# Patient Record
Sex: FEMALE | Race: WHITE | NOT HISPANIC OR LATINO | Employment: FULL TIME | URBAN - METROPOLITAN AREA
[De-identification: names, ages, dates, MRNs, and addresses within clinical notes are randomized per-mention and may not be internally consistent; named-entity substitution may affect disease eponyms.]

---

## 2022-05-17 ENCOUNTER — MEDICAL CORRESPONDENCE (OUTPATIENT)
Dept: HEALTH INFORMATION MANAGEMENT | Facility: CLINIC | Age: 27
End: 2022-05-17
Payer: COMMERCIAL

## 2022-05-23 ENCOUNTER — MEDICAL CORRESPONDENCE (OUTPATIENT)
Dept: HEALTH INFORMATION MANAGEMENT | Facility: CLINIC | Age: 27
End: 2022-05-23
Payer: COMMERCIAL

## 2022-05-23 ENCOUNTER — TRANSFERRED RECORDS (OUTPATIENT)
Dept: HEALTH INFORMATION MANAGEMENT | Facility: CLINIC | Age: 27
End: 2022-05-23
Payer: COMMERCIAL

## 2022-05-23 DIAGNOSIS — O09.892 HX OF PRETERM DELIVERY, CURRENTLY PREGNANT, SECOND TRIMESTER: ICD-10-CM

## 2022-05-23 DIAGNOSIS — G40.909 SEIZURE DISORDER DURING PREGNANCY, ANTEPARTUM (H): Primary | ICD-10-CM

## 2022-05-23 DIAGNOSIS — O99.350 SEIZURE DISORDER DURING PREGNANCY, ANTEPARTUM (H): Primary | ICD-10-CM

## 2022-06-03 ENCOUNTER — PRE VISIT (OUTPATIENT)
Dept: MATERNAL FETAL MEDICINE | Facility: CLINIC | Age: 27
End: 2022-06-03
Payer: COMMERCIAL

## 2022-06-09 ENCOUNTER — OFFICE VISIT (OUTPATIENT)
Dept: MATERNAL FETAL MEDICINE | Facility: CLINIC | Age: 27
End: 2022-06-09
Attending: OBSTETRICS & GYNECOLOGY
Payer: COMMERCIAL

## 2022-06-09 ENCOUNTER — HOSPITAL ENCOUNTER (OUTPATIENT)
Dept: ULTRASOUND IMAGING | Facility: CLINIC | Age: 27
Discharge: HOME | End: 2022-06-09
Attending: OBSTETRICS & GYNECOLOGY
Payer: COMMERCIAL

## 2022-06-09 DIAGNOSIS — O09.892 HX OF PRETERM DELIVERY, CURRENTLY PREGNANT, SECOND TRIMESTER: ICD-10-CM

## 2022-06-09 DIAGNOSIS — G40.909 SEIZURE DISORDER DURING PREGNANCY, ANTEPARTUM (H): ICD-10-CM

## 2022-06-09 DIAGNOSIS — O99.350 SEIZURE DISORDER DURING PREGNANCY, ANTEPARTUM (H): ICD-10-CM

## 2022-06-09 PROBLEM — F44.5 CONVERSION DISORDER WITH SEIZURES OR CONVULSIONS: Status: ACTIVE | Noted: 2019-04-18

## 2022-06-09 PROCEDURE — 76811 OB US DETAILED SNGL FETUS: CPT | Mod: 26 | Performed by: OBSTETRICS & GYNECOLOGY

## 2022-06-09 PROCEDURE — 76817 TRANSVAGINAL US OBSTETRIC: CPT | Mod: 26 | Performed by: OBSTETRICS & GYNECOLOGY

## 2022-06-09 PROCEDURE — 76811 OB US DETAILED SNGL FETUS: CPT

## 2022-06-09 PROCEDURE — 99202 OFFICE O/P NEW SF 15 MIN: CPT | Mod: 25 | Performed by: OBSTETRICS & GYNECOLOGY

## 2022-06-09 RX ORDER — METOCLOPRAMIDE 10 MG/1
TABLET ORAL
COMMUNITY
Start: 2022-03-25 | End: 2023-08-23

## 2022-06-09 RX ORDER — PROMETHAZINE HYDROCHLORIDE 25 MG/1
TABLET ORAL
COMMUNITY
End: 2023-08-23

## 2022-06-09 RX ORDER — ONDANSETRON 4 MG/1
TABLET, FILM COATED ORAL
COMMUNITY
End: 2023-08-23

## 2022-06-09 NOTE — PROGRESS NOTES
"RE: Myrtle Strickland  : 1995  MRUN: [id number]    2022    Dear Dr. Patricia,    Thank you for referring your patient Myrtle Strickland for a Maternal-Fetal Medicine consultation today.  As you know, Myrtle is a 27 year old  at 19w6d gestation with an Estimated Date of Delivery: Oct 28, 2022 by LMP who presents today to discuss recommendations in the setting of maternal seizure disorder and history of  birth.     Today Myrtle feels well. She presents with her mother today.  She reports her seizures stress and anxiety induced, and always have been.  Her mother notes that she had febrile seizures in childhood however grew out of those and then subsequently had seizures mostly around stress.  Myrtle reports that she is taking anxiety medication to help with this.  She notes this pregnancy has been difficult as she has had a lot of nausea and vomiting as well as episodes of passing out.  Her syncopal episodes are more frequent in the first trimester but have improved in the second trimester.  Her daughter who is almost 3 years old usually spends most of the time with the FOB, however he is out of town and so she will be spending more time with Myrtle's mother.  They have decided together that she will try to avoid alone time given her frequent seizures and syncopal episodes.  The FOB for her current pregnancy is involved but also out of town at this time.  She did have a recent EEG as recommended by neurology, which showed the same as before, which is anxiety induced seizure episodes.  She had a follow-up with them by phone.  She is aware of seizure precautions including no driving and avoiding activities that would pose a risk to her safety if she were to have an episode during them including swimming or bathing.    Aside from her seizure history, Myrtle does note a family history of OTC deficiency, and poor history of \"boys in the family.\"  Other relatives have had second trimester losses of boys as well as " "boy children in the family who are affected by OTC deficiency.   The patient and her mother report they have been tested and are both carriers for this, but she has not had testing in pregnancy before.  Her mother notes she had a amniocentesis during one of her pregnancies which confirmed this disease.  Myrtle notes that she has discussed this with her current partner and they are undecided about genetic testing however will consider this.    Regarding her  birth history, She reports she went into labor around 34 weeks.  Her daughter was born shortly thereafter and did have an extended stay in the NICU, but overall did very well.  She notes she has had 2 prior ultrasounds in this pregnancy, and previous providers have commented on \"how low the baby is \"and have expressed concern about this and the need for bedrest.  Patient expressed she has not been doing this and has been doing her normal daily activity.  She notes previous provider had once mentioned progesterone supplementation this pregnancy given her history of  birth, however was not brought up again.  She has already done her own research with her partner and they decided based on the risks and benefits this was not worth pursuing.    Obstetrical History:    OB History    Para Term  AB Living   5 1 0 1 3 1   SAB IAB Ectopic Multiple Live Births   2 0 0 0 1      # Outcome Date GA Lbr Karl/2nd Weight Sex Delivery Anes PTL Lv   5 Current            4  19 34w5d  2.34 kg (5 lb 2.5 oz) F Vag-Spont   CLARISSA      Name: Papo      Apgar1: 8  Apgar5: 9   3 SAB 13 6w0d          2 2010 4w0d          1 AB              Gynecological History:    Regular cycles.    Last pap 3/2021, NILM    No known history of myomas or uterine abnormalities.    Medical History:   Past Medical History:   Diagnosis Date     Depressive disorder      Neurological disorder      Uncomplicated asthma    ADHD  Anxiety/depression  PNEA    Surgical " History:   None    Medications:     Current Outpatient Medications:      metoclopramide (REGLAN) 10 MG tablet, metoclopramide 10 mg tablet  Take 1 tablet by mouth three times a day as needed (nausea)., Disp: , Rfl:      Prenatal MV & Min w/FA-DHA (PRENATAL ADULT GUMMY/DHA/FA PO), Prenatal Gummies, Disp: , Rfl:      promethazine (PHENERGAN) 25 MG tablet, promethazine 25 mg tablet  Take 1 tablet by mouth every 6-8 hours by oral route., Disp: , Rfl:      sertraline (ZOLOFT) 50 MG tablet, sertraline 50 mg tablet  Take 1 tablet every day by oral route., Disp: , Rfl:      ondansetron (ZOFRAN) 4 MG tablet, ondansetron HCl 4 mg tablet  Take 1 tablet by mouth every 6-8 hours as directed. (Patient not taking: Reported on 6/9/2022), Disp: , Rfl:      Allergies:   Allergies   Allergen Reactions     Guaifenesin Hives and Nausea and Vomiting     Adhesive Tape Other (See Comments)     Burns only with waterproof bandages. [facility] hospital Tegaderm and tape is okay.  Burns only with waterproof bandages. [facility] Tegaderm and tape is okay.  Burns only with waterproof bandages. [facility] Tegaderm and tape is okay.  Burns only with waterproof bandages. [facility] Tegaderm and tape is okay.       Mushroom      Latex Hives, Itching and Rash     Social History:    Non-contributory    Family History:     Patient reports family history of ornithine transcarboxylase deficiency       Review of Systems:    10 point review of systems negative except as noted in the HPI    Data Reviewed:      Prenatal labs  o Rh type: negative, antibody screen: negative  o Hemoglobin 12.9 mg/dL, hematocrit 39.7 %, MCV 91 fL, platelets 274 thou/ L  o Hemoglobin electrophoresis:  o Syphillis/HepBsAg/HIV: NR  o Hepatitis C: NR  o Rubella IgG: immune  o Gonorrhea/Chlamydia: negative  o Urine culture: urogenital debbie  o Pap smear: 3/21/22 NILM HPV negative  o Aneuploidy screening: low risk NIPT    Ultrasound:    Please see imaging tab/separate  report for ultrasound performed at [facility] today    Physical examination was deferred at this time.    Assessment/Plan:  In light of the patient s history as listed above our discussion and recommendations can be summarized briefly as follows:    History of Spontaneous  Birth (PTB)   Today we discussed the incidence and epidemiology of  birth (PTB).  There are multiple etiologies of spontaneous PTB, including but not limited to infection, bleeding, uterine distension, cervical insufficiency and stress.   However, most spontaneous  deliveries occur in patients with no risk factors.  A history of prior  delivery is a significant risk factor for recurrence in a subsequent pregnancy. We discussed her risk of one prior spontaneous PTB ranges from 15-30%.  Recurrent  delivery has been linked to maternal ethnicity, genitourinary infection, and especially gestational age at the first  delivery: the earlier the delivery, the greater the likelihood of recurrence.  We discussed that recurrent  birth can happen at the same gestational age as a prior  birth but that it cannot be predicted and it could recur at an earlier gestational age.       We discussed the increased  morbidity and mortality with prematurity which is the rationale for trying to prevent recurrent  birth.  We typically follow cervical lengths every two from 16 to 23 weeks in women with a history of PTB; an ultrasound indicated cerclage may be considered if shortening is detected prior to 23-24 weeks, given that all cases of cervical insufficiency may not fit into the classic patterns by history.    We briefly discussed the mixed data regarding the use of weekly intramuscular injections of 17 hydroxyprogesterone caproate (17-OHP) supplementation as a means to modify the risk of recurrent  birth.  A multicenter, double-blind randomized controlled trial found a 34% reduction in recurrent   birth <37 weeks (Vance et al, 2003) with the use of 17-OHP .  This trial also found a significant reduction in recurrent  birth <35 and <32 weeks.  A more recent international, double-blind randomized controlled trial (PROLONG) found no reduction in the rate of recurrent  birth <35 weeks (Esequiel et al, 2019), calling into question the established use of 17-OHP.  Importantly, both studies indicate that 17-OHP is safe, at least in the short term. The current conclusion of [facility] is that it is reasonable to offer 17-OHP to women with a profile more representative of the very high risk patient, but that all women at risk of recurrent  birth should have a discussion of the risks/benefits and undergo a shared decision-making process. Myrtle expressed that she does not have an interest in pursuing this intervention.    Recommendations:  ; Repeat TVUS cervical length once more in 2 weeks from now.  ; Ultrasound-indicated cerclage should be considered if shortening <25 mm is diagnosed <24 weeks.  ; Administration of  corticosteroids if the patient is deemed to be at increased risk of imminent  delivery.  ; Urine culture every trimester with aggressive treatment of bacteriuria.  ; Clinical evaluation of  labor symptoms.      History of Non-epileptiform seizure activity disorder  As above, we reviewed her seizure history and consultation with neurology. Given her seizures are not epileptiform in nature, she is not taking any antiepileptic drugs at this time. She is on medication for anxiety, which she should continue as indicated. We discussed that we defer to neurology and agree with their recommendations including standard seizure precautions such as no driving, swimming, or bathing.  We briefly discussed the recommendation to consider brain MRI after pregnancy, however she can follow-up with neurology as needed for this. There is otherwise no changes to management in  pregnancy aside from routine prenatal care. She can follow-up with her primary obstetric provider for this.    Family history of OTC Deficiency  We discussed the option for genetic counseling given this family history. Patient said she will discuss with her partner, consider it and will call our clinic to schedule an appointment if desired.     At the end of our discussion, Myrtle indicated that her questions were answered and she seemed satisfied with our discussion.  Thank you for the opportunity to participate in your patient s care.  If I can be of any further assistance, please do not hesitate to contact me.    Patient seen with Dr. Camacho.    Tracy Steele MD  ObGyn, PGY-2  06/09/22 9:08 AM

## 2022-06-09 NOTE — NURSING NOTE
"Pt here for L2/TV, MFM consult d/t hx of PTD, seizure disorder, remote hx of asthma/MACK. Pt denies substance exposure in this pregnancy, no recent seizures. States seizures are anxiety induced. Prior to her PTD she states she was [place] for 2 months, came in \"partially\" ruptured and 1-2 cm by record review. New FOB this pregnancy, pt concerned that primary OB hasn't completed family hx of FOB. She shares today that fam hx includes lung cancer, HTN, cardiovascular disease, ADD. No heart defects requiring surgery that she is aware of except FOBs nephew who is now doing well. No FOB personal or immediate family medical hx of CHD requiring surgery. Pt seen by Dr. Camacho.   "

## 2022-06-27 ENCOUNTER — TELEPHONE (OUTPATIENT)
Dept: MATERNAL FETAL MEDICINE | Facility: CLINIC | Age: 27
End: 2022-06-27

## 2022-06-27 DIAGNOSIS — E72.4 OTC (ORNITHINE TRANSCARBAMYLASE DEFICIENCY) (H): Primary | ICD-10-CM

## 2022-06-27 DIAGNOSIS — Z82.79 FAMILY HISTORY OF CONGENITAL OR GENETIC CONDITION: ICD-10-CM

## 2022-06-27 NOTE — TELEPHONE ENCOUNTER
"Talked with Myrtle after she called to order an amniocentesis. [facility] did not have orders for amniocentesis or an indication. Myrtle reviewed a discussion she had during her [facility] consult about her family history of OTC deficiency and unfortunately, infant male deaths. Myrtle shares her own diagnosis of OTC deficiency in childhood when she had \"episodes\". She also now has a history of seizures. She would like to do an amniocentesis since this pregnancy is known male (ultrasound and NIPT) to determine if he has OTC deficiency as well. Upon gathering more information, Myrtle has never had genetic testing to confirm her OTC variant, which would be needed before we can test the pregnancy. She feels she would like to do the amniocentesis around 30 weeks because the results would not alter their pregnancy decisions and due to her history of  delivery she would like the procedure further along so if it causes  labor it would be past viability. 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 If she had an amniocentesis and her male fetus inherited the OTC deficiency that would also prompt a prenatal plan for delivery and she would recommended to deliver at the [facility] with genetics support and management and the best NICU.     Plan: The first step will be carrier testing for Myrtle to determine her variant. This will be done with a genetic counseling visit. Myrtle will be coming in Thursday.     Gisel Pool MS, [facility]  Licensed Genetic Counselor   [facility]  Maternal Fetal Medicine  [email or web address]  [email or web address]  Office: [phone]  Pager [phone]  [facility]: [phone]   Fax: [phone]                                    "

## 2022-06-28 ENCOUNTER — TELEPHONE (OUTPATIENT)
Dept: MATERNAL FETAL MEDICINE | Facility: CLINIC | Age: 27
End: 2022-06-28

## 2022-06-28 NOTE — TELEPHONE ENCOUNTER
Called Myrtle after reviewing case during genetics case conference. General genetics would like to see patient instead of [facility] genetics. General will assess Myrtle's OTC carrier status through biochemical testing and genetic testing. Upon the identification of the familial variant, [facility] will then speak with Myrtle about coordinating an amniocentesis. If the male pregnancy is found to have inherited the variant, [facility] will coordinate with pediatric genetics to plan for management and delivery of the pregnancy, as well as infant care at [place]. Myrtle is agreeable to this plan. A provider or  from genetics will Myrtle to offer an appointment.     Gisel Pool MS, [facility]  Licensed Genetic Counselor   [facility]  Maternal Fetal Medicine  [email or web address]  [email or web address]  Office: [phone]  Pager [phone]  [facility]: [phone]   Fax: [phone]

## 2022-07-19 NOTE — PROGRESS NOTES
Name:  Myrtle Strickland  :   1995  MRN:   [id number]  Date of service: 2022  Primary Provider: No primary care provider on file.  Referring Provider: No ref. 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 Myrtle was accompanied to this visit by her . I met with Myrtle at the request of Dr. June to obtain a family history, discuss possible genetic contributions to her symptoms, and to obtain informed consent for genetic testing.    HPI:  Myrtle is a 27 year old-year old female who is 25w6d pregnant () with a family history of OTC deficiency, seizure disorder, ADHD, Asperger's, asthma, and depression. Her fetus is male, and her pregnancy care is currently managed by [facility]. Myrtle reports that she wants genetic testing to confirm her carrier status, and if she is confirmed a carrier she wants an amniocentesis to confirm if her male fetus is affect to help with the birth plan.     She reports that she was diagnosed with OTC deficiency at age 6 which was managed by a low protein diet. Both her mother and sister were diagnosed with OTC deficiency as well during pregnancy via biochemical testing. Her late brother passed shortly after birth due to unknown causes, Myrtle reports his cord blood may have been tested for OTC deficiency. Myrtle has not received genetic testing for OTC deficiency, and it is unclear if other family members have received genetic testing. No biochemical or molecular reports are available for review during today's visit.     She reports her seizures stress and anxiety induced, and are currently managed with anxiety medication.  Her mother notes that she had febrile seizures in childhood however grew out of those at age [age], and then subsequently had seizures mostly around stress.       Patient Active Problem List  "  Diagnosis     Asperger's disorder     Anxiety disorder     Attention deficit hyperactivity disorder (ADHD)     Benign joint hypermobility     Conversion disorder with seizures or convulsions     Developmental expressive writing disorder     History of asthma      delivery     Orthostatic hypotension     Seizure disorder (H)     Past Medical History:  Past Medical History:   Diagnosis Date     Depressive disorder      Neurological disorder      Uncomplicated asthma        Family History:   A three generation pedigree was obtained today and scanned into the EMR. The following information was provided:  Children    One daughter, 3 years old, who was born premature at 34w5d. She has sever protein aversion and experiences vomitting when eats a lot of protein.   Siblings    Full siblings: One sister, 22, who was diagnosed with OTC deficiency via biochemical labs, she also had a pregnancy loss at 22 weeks, cause unknown. One brother, who passed away in  shortly after birth of unknown causes, he may have also had OTC deficiency, Myrtle believes his cord blood may have been tested.    Maternal Family    Mother, NICHOLE PEREZ:  Diagnosed with OTC deficiency during pregnancy with Myrtle with biochemical testing, and Myrtle believes her mother may have had genetic testing too but records are unavailable. She also has seizure disorder. Breast cancer at age 11, and \"like 30\" different cancers in her lifetime including stomach, uterine, ovarain, bone, and multiple skin cancers.     Maternal grandfather: passed of emphysema    Maternal grandmother: passed age 80 from protein deficiency. Breast cancer in her 30s.    Maternal aunts/uncles: One aunt who is well. Two identical twin uncles, one who is well, the other who has lung cancer and stomach cancer in his 40s, and a brain tumor unknown type but likely benign.     Maternal cousins: One female cousin from the aunt, who has OTC deficiency-like symptoms that were first " observed while she was pregnant. One female cousin through the uncle who had cancers, who is 7 years old and noticed signs of OTC deficiency-like symptoms at age 4 including severe protein aversion.    Paternal Family    Father, Data Unavailable: substance abuse, bones that break easily, and memory loss believed to be related to the substance abuse.     Paternal grandfather: passed, history of substance abuse.     Paternal grandmother: History of breast cancer in her 20s, diabetic, and has internal heart monitor.    Paternal aunts/uncles: One aunt, health status unknown. One uncle, passed of breast, bone, and brain cancer in his 60s. He also had his first bout of breast cancer in his early 50s.     The family history is otherwise negative for protein aversion, seizures, coma, sudden death, genetic testing, and known genetic disorders. Consanguinity was not assessed.    Discussion and Assessment:  Genetics of OTC Deficiency  As a review, ornithine transcarbamylase (OTC) deficiency is known as a urea cycle disorder. The urea cycle is a biochemical process in the cells of the body that helps convert nitrogen to urea so it can be removed by the body. Different steps of the urea cycle are completed by specialized proteins called enzymes. These enzymes are coded for by our genes.     Genes are long stretches of DNA that are responsible for how our bodies look and how our bodies work. Genes are inherited from the parents. When there is a change, called a variant, in the DNA sequence of a gene it can cause the gene to not work the way it is supposed to. OTC deficiency is caused by variants in a gene called OTC which produces an enzyme important for the urea cycle. Variants therefore disrupt the urea cycle and cause nitrogen in the form of ammonia to build up.  At high levels this is toxic to the body, and can damage the nervous system.     Our genes are inherited on structures called chromosomes of which we have 23 pairs.  "The first 22 pairs of chromosomes are the same in males and females while the final pair of chromosomes, the sex chromosomes (X and Y), are different in males and females. Males have one copy of the X-chromosome and one copy of the Y-chromosome while females have two copies of the X-chromosome. Therefore females have two copies of each gene on the X-chromosome while males have just one. The OTC gene is found on the X-chromosome. Therefore females have two copies of this gene while males have just one.     Because females have two copies of the OTC gene, if they have a variant on one copy of the OTC gene they still have a \"back-up\" copy of the OTC gene without a variant. Males, in contrast, do not have a back-up OTC gene. Therefore if they have a variant in this gene they tend to have more severe symptoms than females. However, some females do have symptoms of OTC deficiency.     Whether or not a female has symptoms is in large part due to something called X-inactivation. This means that in each cell a woman has in her body, only one copy of the X-chromosome is active (turned on and making enzyme) while the other is inactivated (turned off and not making enzyme). If the X-chromosome with the OTC variant is activated/turned on in more cells, she may be more likely to have symptoms.     For a female who carries an OTC variant, there is a 50% chance with each pregnancy that she will pass down the copy of the OTC gene with the variant, and a 50% chance she will pass down her copy of the OTC gene without the variant. Whether a child is male or female depends on the male partner and which sex chromosome he passes on (the X-chromosome or the Y-chromosome).     A male with OTC deficiency also has a 50% chance of passing down his OTC gene variant. Because the OTC gene is on the X-chromosome, if he passes this down all of his female children will inherit the OTC variant. If he passes down his Y-chromosome which does not have " an OTC gene, this child would be a male and he would not have an OTC variant.    Genetic Testing  Genetic testing can look for a DNA change that can lead to OTC deficiency. There are a few possible outcomes to this testing:     - a positive result: This means we found a DNA change in OTC gene. This would be consistent with confirmation of OTC deficiency carrier status. Female carriers of OTC deficiency can still have symptoms of OTC deficiency.   - a negative result: This means that we did not find a DNA change in OTC gene. Currently, genetic testing can detect a DNA change in 90% of OTC cases cases so a negative result does not rule out the possibility of OTC deficiency.   - a variant of unknown significance (VUS) result: This means that we found a DNA change but little is known about this specific DNA change to determine whether it can cause OTC deficiency. As we learn more about genetics of OTC deficiency, VUS results could be re-classified as either a positive or negative result therefore it is important to check back in with genetics every few years in the event of a VUS result.     Management and surveillance of Myrtle will depend on the genetic test results. It can also help predict the recurrence risk for Myrtle and other family members to have children with similar healthcare needs.    Prior Authorization and Initiating Testing  Insurance prior authorization and billing procedures were covered with the family. Our prior authorization process takes 2 to 4 weeks, at which time the family will be contacted with the determination. They can choose to cancel the test if they wish, otherwise, a blood draw will be scheduled. The family may be charges separately for a blood draw.    Results are expected in 4-6 weeks. I will call Myrtle when we have results and we will schedule a follow-up visit if needed at that time. Myrtle is encouraged to reach out to me with any questions in the meantime.     Family History of  "Cancer  The family history of multiple primary cancers in an individual, breast cancers at an early age, and male breast cancer are concerning for an inherited genetic risk for cancer. While the majority of cancer is sporadic in nature, some families carry a genetic predisposition to cancer. These families have a clustering of cancer in the family and/or early ages of onset. Along with the cancer types already seen in the family, the risk for other types of cancer may also be increased. We discussed the purpose of genetic testing and the changes in management that could be made based on genetic testing, personal, and family history. We discussed that the best individual to test is an individual who has been diagnosed with cancer. Local cancer genetic counseling testing is available through the Cancer Risk Management Program (contact information below).  They can ask for a referral from their primary care provider. It is also important to continue to inform healthcare providers about the family history of cancer so they can recommend appropriate screening and management.     Cancer Risk Management  ? 4-426-792-2134  ? [email or web address]-[email or web address]-management-program     If relatives are not local and need assistance finding a genetic counselor in their area, they can use the www.nsgc.org \"find a genetic counselor\" link.    Plan:  1. A blood sample will be collected and sent to [facility] the [facility] for OTC gene sequencing with deletion/duplication analysis on a genome backbone.    2. After testing is initiated, results will be returned by phone in 4-6 weeks and we will schedule a follow-up appointment according to Dr. June.    3. Contact information was provided should any questions arise in the future.      Agustina Cartwright MS Genetic Counseling Program Student   [facility]     Approximate Time Spent in Consultation: 60 min    "

## 2022-07-19 NOTE — PROGRESS NOTES
GENETICS CLINIC CONSULTATION     Name:  Myrtle Strickland  :   1995  MRN:   [id number]  Date of service: 2022  Primary Care Provider: No primary care provider on file.  Referring Provider: No ref. provider found    Dear Dr. Kesha Camacho      We had the pleasure of seeing Myrtle in Genetics Clinic today.     Reason for consultation:  A consultation in the [facility] was requested for Myrtle, a 27 year old female, for evaluation of possible OTC deficiency.      Myrtle was accompanied to this visit by her partner. She also saw our genetic counselor and metabolic dietitian at this visit.       History is obtained from electronic health record.    Assessment:    Myrtle Strickland is a 27 year old female with a self reported history of being diagnosed with OTC during childhood. Unfortunately, no biochemical or molecular reports are available for review during today's visit.    We will obtain plasma amino acids, ammonia, CMP and urine orotic acid as a part of biochemical work up. We will also send OTC single gene testing to be done on a genome platform through [facility].  If biochemical or molecular testing confirms OTC, appropriate treatment will be started.  It was also recommended that Myrtle report a 3-day diet history to her metabolic dietitian to assess her baseline protein intake.    If both biochemical as well as molecular testing comes back negative, it would be recommended to repeat a plasma amino acids after higher protein intake.  Brief discussion regarding dietary and medical management for her as well as her son after birth done during today's visit.  With an extensive family history of multiple cancers, cancer genetic counseling is recommended for her    We will see her back in 1 month or sooner pending test results.  Myrtle verbalized understanding and agreed to the plan. All questions were answered to the best of my knowledge.    Plan:    1. Ordered at this visit:   Orders Placed  This Encounter   Procedures     Amino acids plasma quantitative     Ammonia     Comprehensive metabolic panel     Organic Acid Comprehensive Urine     Creatinine random urine     Orotic Acid Urine     Urine Creatinine (Random) for Endocrine Testing     Organic acid comprehensive urine     Orotic acid urine         2. Genetic testing: Prior-auth for NGS (sequencing+del/dup).   3. Genetic counseling consultation with Sahra Aguayo MS, [facility] to obtain pedigree and obtain consent for genetic testing   4. Follow up: 1 month or sooner pending test results    Update 2022 7 PM:  Chinmay ammonia and liver function came back completely normal.  -----------------------------------    History of Present Illness:  Myrtle Strickland is a 27 year old female with    Patient Active Problem List   Diagnosis     Asperger's disorder     Anxiety disorder     Attention deficit hyperactivity disorder (ADHD)     Benign joint hypermobility     Conversion disorder with seizures or convulsions     Developmental expressive writing disorder     History of asthma      delivery     Orthostatic hypotension     Seizure disorder (H)     Myrtle reports being born 6 weeks prematurely.  She had normal growth and development during infancy.  She reports having dietary protein aversion in late infancy.  She would have symptoms of GI upset and vomiting with excessive protein intake.  She was reportedly diagnosed with OTC at the age of 4-6 years of age at the [facility] following an episode of GI upset following protein intake.  She reports being on a protein restricted diet since then.  She has never taken Citrulline supplements as a child.  Myrtle does not count protein but try to restrict herself from taking too much protein.  She does not report having any clinical complications with her previous pregnancy and childbirth suggestive of a hyperammonemic episode    There is also history of febrile seizure starting early in childhood.  She reports  having seizure-like episodes in the absence of fever when she was in middle school.  Each episode would last for 30 seconds to 1-1/2 minutes and this would involve twitching of her extremities and would be followed by postictal phase.  She was apparently evaluated by neurology and was diagnosed with anxiety which were provoking the seizures.  She has been on Zoloft which helps with her anxiety.    She reports having learning issues in school and being diagnosed with ADHD and autism spectrum disorder.    There is also history of multiple female family members on her mother side of the family with a history of protein intolerance and diagnosis of OTC.  It is unclear if anyone has had a molecular testing being done.    Past Medical History:  Past Medical History:   Diagnosis Date     Depressive disorder      Neurological disorder      Uncomplicated asthma      No significant hospitalizations    Past Surgical History:  History of adenotonsillectomy in early childhood.    Medications:  Current Outpatient Medications   Medication Sig Dispense Refill     metoclopramide (REGLAN) 10 MG tablet metoclopramide 10 mg tablet   Take 1 tablet by mouth three times a day as needed (nausea).       ondansetron (ZOFRAN) 4 MG tablet ondansetron HCl 4 mg tablet   Take 1 tablet by mouth every 6-8 hours as directed. (Patient not taking: Reported on 6/9/2022)       Prenatal MV & Min w/FA-DHA (PRENATAL ADULT GUMMY/DHA/FA PO) Prenatal Gummies       promethazine (PHENERGAN) 25 MG tablet promethazine 25 mg tablet   Take 1 tablet by mouth every 6-8 hours by oral route.       sertraline (ZOLOFT) 50 MG tablet sertraline 50 mg tablet   Take 1 tablet every day by oral route.         Allergies:  Allergies   Allergen Reactions     Guaifenesin Hives and Nausea and Vomiting     Adhesive Tape Other (See Comments)     Burns only with waterproof bandages. [facility] Tegaderm and tape is okay.  Burns only with waterproof bandages. [facility]  "Tegaderm and tape is okay.  Burns only with waterproof bandages. [facility] hospital Tegaderm and tape is okay.  Burns only with waterproof bandages. [facility] hospital Tegaderm and tape is okay.       Mushroom      Latex Hives, Itching and Rash       Immunization:  Most Recent Immunizations   Administered Date(s) Administered     DTAP (<7y) 03/23/1999     DTP-Hib 1995     FLU 6-35 months 11/19/2012     Flu, Unspecified 09/04/2009     HPV Quadrivalent 12/08/2008     Hep B, Peds or Adolescent 1995     HepA-ped 2 Dose 06/11/2008     HepB-Adult 1995     Hib (PRP-T) 09/24/1996     Historical DTP/aP 1995     Influenza Vaccine IM > 6 months Valent IIV4 (Alfuria,Fluzone) 11/19/2012     MMR 03/23/1999     Meningococcal (Menactra ) 08/16/2007     Meningococcal (Menomune ) 08/16/2007     OPV, trivalent, live 03/23/1999     TRIHIBIT (DTAP/HIB, <7y) 03/23/1999     UTD: Yes    Diet:  Restricted.  Reports self restriction of protein intake    Care team:  No care team member to display        ROS   ROS: 10 point ROS neg other than the symptoms noted above in the HPI.  Family History:    A detailed pedigree was obtained by the genetic counselor at the time of this appointment and is scanned into the electronic medical record. I personally reviewed and discussed the pedigree with the GC and the family and concur with the GC note. Please refer to the formal pedigree for full details.   No family history on file.    Social History:  Lives with daughter and spouse/partner    Physical Examination:  Last menstrual period 01/21/2022.  /76 (BP Location: Right arm, Patient Position: Sitting, Cuff Size: Adult Regular)   Pulse 83   Ht 5' 6.14\" (168 cm)   Wt 148 lb 13 oz (67.5 kg)   LMP 01/21/2022   HC 54.8 cm (21.58\")   BMI 23.92 kg/m        General: WDWN in NAD, appears stated age  Head and Face: NCAT  Ears: Well-formed, normal in position and placement, canals patent  Eyes: Normal in position and placement, " EOMI; lids, lashes, and brows unremarkable  Nose: Nares patent  Mouth/Throat: Lips, philtrum, palate, dentition unremarkable  Neck: No pits, tags, fissures  Chest: Symmetric,   Respiratory: Clear to auscultation bilaterally  Cardiovascular: Regular rate and rhythm with no murmur  Abdomen: Deferred  Genitourinary: Deferred  Extremities/Musculoskeletal: Symmetrical; full ROM; hands, feet, nails, palmar and plantar creases unremarkable  Neurologic: Mental status appropriate for age; good tone, strength, and muscle bulk  Skin: Unremarkable    Genetic testing done to date:  N/A    Pertinent lab results:   N/A    Imaging/ procedure results:  N/A  No results found for this or any previous visit (from the past 744 hour(s)).         Thank you for allowing us to participate in the care of Myrtleangus Strickland. Please do not hesitate to contact us with questions.      90 minutes spent on the date of the encounter doing chart review, history and exam, documentation and further activities per the note           Suzy June MD    Genetics and Metabolism  Pager: [phone]     [facility] [employer] ([employer] speech recognition transcription software was used to create portions of this document.  An attempt at proofreading has been made to minimize errors; however, minor errors in transcription may be present. Please call if questions.    Route to  No care team member to display

## 2022-07-20 ENCOUNTER — OFFICE VISIT (OUTPATIENT)
Dept: CONSULT | Facility: CLINIC | Age: 27
End: 2022-07-20
Attending: PEDIATRICS
Payer: COMMERCIAL

## 2022-07-20 ENCOUNTER — TELEPHONE (OUTPATIENT)
Dept: NUTRITION | Facility: CLINIC | Age: 27
End: 2022-07-20

## 2022-07-20 VITALS
DIASTOLIC BLOOD PRESSURE: 76 MMHG | HEIGHT: 66 IN | HEART RATE: 83 BPM | SYSTOLIC BLOOD PRESSURE: 108 MMHG | BODY MASS INDEX: 23.92 KG/M2 | WEIGHT: 148.81 LBS

## 2022-07-20 DIAGNOSIS — Z71.83 ENCOUNTER FOR NONPROCREATIVE GENETIC COUNSELING: Primary | ICD-10-CM

## 2022-07-20 DIAGNOSIS — E72.4 OTC (ORNITHINE TRANSCARBAMYLASE DEFICIENCY) (H): Primary | ICD-10-CM

## 2022-07-20 DIAGNOSIS — Z84.89 FAMILY HISTORY OF SUDDEN DEATH IN BROTHER: ICD-10-CM

## 2022-07-20 DIAGNOSIS — E72.4 OTC (ORNITHINE TRANSCARBAMYLASE DEFICIENCY) (H): ICD-10-CM

## 2022-07-20 LAB
ALBUMIN SERPL-MCNC: 2.6 G/DL (ref 3.4–5)
ALP SERPL-CCNC: 108 U/L (ref 40–150)
ALT SERPL W P-5'-P-CCNC: 12 U/L (ref 0–50)
AMMONIA PLAS-SCNC: 11 UMOL/L (ref 10–50)
ANION GAP SERPL CALCULATED.3IONS-SCNC: 7 MMOL/L (ref 3–14)
AST SERPL W P-5'-P-CCNC: 12 U/L (ref 0–45)
BILIRUB SERPL-MCNC: 0.3 MG/DL (ref 0.2–1.3)
BUN SERPL-MCNC: 7 MG/DL (ref 7–30)
CALCIUM SERPL-MCNC: 8.4 MG/DL (ref 8.5–10.1)
CHLORIDE BLD-SCNC: 109 MMOL/L (ref 94–109)
CO2 SERPL-SCNC: 23 MMOL/L (ref 20–32)
CREAT SERPL-MCNC: 0.65 MG/DL (ref 0.52–1.04)
CREAT UR-MCNC: 266 MG/DL
CREAT UR-MCNC: 266 MG/DL
GFR SERPL CREATININE-BSD FRML MDRD: >90 ML/MIN/1.73M2
GLUCOSE BLD-MCNC: 73 MG/DL (ref 70–99)
INTERPRETATION: NORMAL
LAB PDF RESULT: NORMAL
POTASSIUM BLD-SCNC: 3.7 MMOL/L (ref 3.4–5.3)
PROT SERPL-MCNC: 6.4 G/DL (ref 6.8–8.8)
SIGNIFICANT RESULTS: NORMAL
SODIUM SERPL-SCNC: 139 MMOL/L (ref 133–144)
SPECIMEN DESCRIPTION: NORMAL
TEST DETAILS, MDL: NORMAL

## 2022-07-20 PROCEDURE — 83918 ORGANIC ACIDS TOTAL QUANT: CPT | Performed by: PEDIATRICS

## 2022-07-20 PROCEDURE — 99205 OFFICE O/P NEW HI 60 MIN: CPT | Performed by: PEDIATRICS

## 2022-07-20 PROCEDURE — 83921 ORGANIC ACID SINGLE QUANT: CPT | Performed by: PEDIATRICS

## 2022-07-20 PROCEDURE — 36415 COLL VENOUS BLD VENIPUNCTURE: CPT | Performed by: PEDIATRICS

## 2022-07-20 PROCEDURE — 99417 PROLNG OP E/M EACH 15 MIN: CPT | Performed by: PEDIATRICS

## 2022-07-20 PROCEDURE — 82570 ASSAY OF URINE CREATININE: CPT | Performed by: PEDIATRICS

## 2022-07-20 PROCEDURE — 80053 COMPREHEN METABOLIC PANEL: CPT | Performed by: PEDIATRICS

## 2022-07-20 PROCEDURE — 82040 ASSAY OF SERUM ALBUMIN: CPT | Performed by: PEDIATRICS

## 2022-07-20 PROCEDURE — 96040 HC GENETIC COUNSELING, EACH 30 MINUTES: CPT | Performed by: GENETIC COUNSELOR, MS

## 2022-07-20 PROCEDURE — 82140 ASSAY OF AMMONIA: CPT | Performed by: PEDIATRICS

## 2022-07-20 PROCEDURE — 82139 AMINO ACIDS QUAN 6 OR MORE: CPT | Performed by: PEDIATRICS

## 2022-07-20 PROCEDURE — G0463 HOSPITAL OUTPT CLINIC VISIT: HCPCS

## 2022-07-20 NOTE — PATIENT INSTRUCTIONS
Genetics  [facility]     Contact our nurse care coordinator Carrie KOLBN, RN, PHN at [phone] or send a j-Grab message for any non-urgent general or medical questions.     If you had genetic testing and have further questions, please contact the genetic counselor:    Sahra Aguayo  Ph: [phone]    To schedule appointments:  Pediatric Call Center for [facility]: [phone]  Neuropsychology [phone]   Radiology/ Imaging/Echocardiogram: [phone]   Services:   [phone]     You should receive a phone call about your next appointment. If you do not receive this within two weeks of your visit, please call [phone].     IF REFERRALS WERE PLACED/ DISCUSSED DURING THE VISIT, PLEASE LET OUR TEAM KNOW IF YOU DO NOT HEAR FROM THE SCHEDULERS IN 2 WEEKS    If you have not already done so consider signing up for [employer] by speaking with the person at the  on your way out or go to [email or web address] to sign up online.     [employer] enables easy and confidential communication with your care team.

## 2022-07-20 NOTE — Clinical Note
"2022      RE: Myrtle Strickland  [address]  [facility] [address]     Dear Colleague,    Thank you for the opportunity to participate in the care of your patient, Myrtle Strickland, at the [facility] at [facility]. Please see a copy of my visit note below.    Name:  Myrtle Strickland  :   1995  MRN:   [id number]  Date of service: 2022  Primary Provider: No primary care provider on file.  Referring Provider: No ref. provider found    Presenting Information:  Myrtle, a 27 year old female, was seen ***via a video visit*** at the [facility]etics clinic for evaluation of ***. Myrtle was accompanied to this visit by her {AAFAMILYMEMBERS:533688}. I met with Myrtle at the request of  {[facility]:269882::\"Claudia\"} to obtain a family history, discuss possible genetic contributions to her symptoms, and to obtain informed consent for genetic testing.    HPI:  Myrtle is a 27 year old-year old female who is 25w6d pregnant () with ***a family history of OTC deficiency, seizure disorder, ADHD, Asperger's, asthma, and depression. Her fetus is male.      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 Both her mother and sister have OTC deficiency as well. She has not received genetic testing, it is unclear if family members have had genetic testing***.  She wants to be seen by genetics to receive testing and if OTC carrier status is confirmed, she wants an amniocentesis to confirm if her child is an affected male to help with the birth plan.     She reports her seizures stress and anxiety induced, and always have been.  Her mother notes that she had febrile seizures in childhood however grew out of those and then subsequently had seizures mostly around stress.  Myrtle reports that she is taking anxiety medication to help with this.     Aside from her " "seizure history, Myrtle does note a family history of OTC deficiency, and poor history of \"boys in the family.\"  Other relatives have had second trimester losses of boys as well as boy children in the family who are affected by OTC deficiency.      No recent biochemical labs for OTC deficiency.    2 SAB, 1 AB    History of substance abuse (alcohol), ***no exposures reported    Patient Active Problem List   Diagnosis     Asperger's disorder     Anxiety disorder     Attention deficit hyperactivity disorder (ADHD)     Benign joint hypermobility     Conversion disorder with seizures or convulsions     Developmental expressive writing disorder     History of asthma      delivery     Orthostatic hypotension     Seizure disorder (H)       Pertinent studies/test results:   ***    Imaging results:   No results found for this or any previous visit (from the past 744 hour(s)).  No results found for any visits on 22.  No results found for this or any previous visit (from the past 8760 hour(s)).    Past Medical History:  Past Medical History:   Diagnosis Date     Depressive disorder      Neurological disorder      Uncomplicated asthma        Past Surgical History:  No past surgical history on file.    Pregnancy/ History:  Mother's age: *** years  Father's age: *** years  Myrtle was born at *** weeks gestation via {aadelivery:925451}  Prenatal care {WAS/WAS NOT:086583} received.   Pregnancy complications included {AApregcomp:597339}  Prenatal testing included {AAPRENDXTEST:576995}  Prenatal exposure and acute maternal illness during pregnancy:  ***.  The APGAR scores were *** at 1 minute and *** at 5 minutes  Birth Weight = 0 lbs 0 oz  Birth Length = Data Unavailable  Birth Head Circum. = Data Unavailable  Birth Discharge Wt. = 0 lbs 0 oz  Complications in the  period included: ***     Family History:   A three generation pedigree was obtained today and scanned into the EMR. The following information " was provided:  Siblings    Full siblings: ***    Paternal half siblings: ***    Maternal half siblings: ***    Maternal Family    Mother, NICHOLE PEREZ:  ***    Maternal grandfather: ***    Maternal grandmother: ***    Maternal aunts/uncles: ***    Maternal cousins: ***    Maternal ancestry: ***    Paternal Family    Father, Data Unavailable: ***    Paternal grandfather: ***    Paternal grandmother: ***    Paternal aunts/uncles: ***    Paternal cousins: ***    Paternal ancestry: ***    The family history is otherwise negative for ***, hearing loss, vision loss, intellectual disability, Autism spectrum disorder, developmental delay, stature differences (short/tall), -cephalic differences (macro/micro), muscle weakness, infertility, multiple miscarriages, stillbirth, birth defects, sudden death, and known genetic disorders. Consanguinity is denied.    Discussion and Assessment:  Genetics Overview  Our DNA is organized into genes, which control how we develop, grow, and how our bodies function. We inherit one copy of every gene from either parent, so one copy from mom and one copy from dad. When there is a change in our DNA it can disrupt how our body develops, grows, or functions and this can result in health differences. A *** change can be inherited from either or both parents, or it can be a new *** change that randomly occurs in a person.    Genetic Condition  ***  As a review, ornithine transcarbamylase (OTC) deficiency is known as a urea cycle disorder. The urea cycle is a biochemical process in the cells of the body that helps convert nitrogen to urea so it can be removed by the body. Different steps of the urea cycle are completed by specialized proteins called enzymes. These enzymes are coded for by our genes.     Genes are long stretches of DNA that are responsible for how our bodies look and how our bodies work. Genes are inherited from the parents. When there is a change, called a mutation, in the DNA  "sequence of a gene it can cause the gene to not work the way it is supposed to. OTC deficiency is caused by mutations in a gene called OTC which produces an enzyme important for the urea cycle. Mutations therefore disrupt the urea cycle and cause nitrogen in the form of ammonia to build up.  At high levels this is toxic to the body, and can damage the nervous system.     Our genes are inherited on structures called chromosomes of which we have 23 pairs. The first 22 pairs of chromosomes are the same in males and females while the final pair of chromosomes, the sex chromosomes (X and Y), are different in males and females. Males have one copy of the X-chromosome and one copy of the Y-chromosome while females have two copies of the X-chromosome. Therefore females have two copies of each gene on the X-chromosome while males have just one. The OTC gene is found on the X-chromosome. Therefore females have two copies of this gene while males have just one.     Because females have two copies of the OTC gene, if they have a mutation on one copy of the OTC gene they still have a \"back-up\" copy of the OTC gene without a mutation. Males, in contrast, do not have a back-up OTC gene. Therefore if they have a mutation in this gene they tend to have more severe symptoms than females. However, some females do have symptoms of OTC deficiency.     Whether or not a female has symptoms is in large part due to something called X-inactivation. This means that in each cell a woman has in her body, only one copy of the X-chromosome is active (turned on and making enzyme) while the other is inactivated (turned off and not making enzyme). If the X-chromosome with the OTC mutation is activated/turned on in more cells, she may be more likely to have symptoms.     For a female who carries an OTC mutation, there is a 50% chance with each pregnancy that she will pass down the copy of the OTC gene with the mutation, and a 50% chance she will pass " down her copy of the OTC gene without the mutation. Whether a child is male or female depends on the male partner and which sex chromosome he passes on (the X-chromosome or the Y-chromosome).     A male with OTC deficiency also has a 50% chance of passing down his OTC gene mutation. Because the OTC gene is on the X-chromosome, if he passes this down all of his female children will inherit the OTC mutation. If he passes down his Y-chromosome which does not have an OTC gene, this child would be a male and he would not have an OTC mutation.      Genetic Testing  Genetic testing can look for a *** change that can lead to ***. There are a few possible outcomes to this testing:     - a positive result: This means we found a change in ***. This would be consistent with a genetic diagnosis or confirmation of carrier status, when a person is not affected with a condition but it is possible to have an affected child.    - a negative result: This means that we did not find a change in ***. Currently, genetic testing can detect a DNA change in ***% of *** cases. If we get this result, our next step would be ***.  - a variant of unknown significance (VUS) result: This means that we found a *** change but little is known about this specific *** change to determine whether it can cause your*** health differences. As we learn more about genetics of ***, VUS results could be re-classified as either a positive or negative result therefore it is important to check back in with genetics every few years in the event of a VUS result. If the testing result is a VUS, then ***.    Management and surveillance of Myrtle will depend on the genetic test results. It can also help predict the recurrence risk for Myrtle and other family members to have children with similar healthcare needs.     ***[facility]  Benefits Investigation and Initiating Testing  *** NICHOLE PEREZ , Data Unavailable ***and/or*** Myrtle provided informed consent for the  "testing, signed with verbal consent (COVID-19)***. Myrtle will have a *** sample collected today in clinic, and this sample will be sent out to a genetic testing laboratory called [facility].     A buccal kit (cheek swab) will be sent to the family's home by a lab called [facility]. This kit must be completed, appropriately labelled with name and date of birth, and [facility] via mail.      After [facility] has received the sample***, they will look into the costs of testing through the family's insurance on their behalf.  This is called an estimation of benefits. This estimation is not guaranteed. The family will be contacted by [facility] with this information if the cost of testing exceeds $100. The family has the right to decline to proceed with testing based on the benefits investigation or switch to a patient-pay/ financial assistance option. If [facility] does not receive permission from the family to proceed with testing, testing may be initiated with insurance billing.     If the estimation of benefits is less than $100, the family will not be contacted and testing will be automatically initiated.     Results are expected in *** weeks. I will call Myrtle when we have results and we will schedule a follow-up visit if needed at that time. @[facility] is encouraged to reach out to me with any questions in the meantime.     Plan:  1. *** will be collected and sent to {DMLABS:133092} for ***    2. After testing is initiated, results will be returned by phone in *** and we will schedule a follow-up appointment according to  {[facility]:099593::\"Claudia\"}.    3. Contact information was provided should any questions arise in the future.      Agustina Cartwright  MS Genetic Counseling Program Student  [facility]     Patient seen, evaluated and discussed with the Genetic Counseling Intern. I have verified the content of the note, which accurately reflects my assessment of the patient and the plan of care.  Supervising Genetic " Counselor  ***, MS, C  Licensed Genetic Counselor   [facility]  Department of ***  Office: ***  Pager: ***    Approximate Time Spent in Consultation: *** min        Please do not hesitate to contact me if you have any questions/concerns.     Sincerely,       Sahra Aguayo, GC

## 2022-07-20 NOTE — PROGRESS NOTES
Clinical Nutrition Services - Brief note     RD checked in with patient at genetics/metabolism visit per MD request for instruction on completion of 3-day food log. Plan for genetic testing and work-up for OTC. Plan to do 3-4 day food log to estimate where current intakes are at. Provided RD contact information and food log forms for patient. Provided instruction on how to complete food logs, being as detailed as possible on food/beverage items consumed (brands, portion sizes, etc), etc. RD provided contact information to send food logs to.     Naima Stockton RD, LD  Email: [email or web address]   Phone: [phone]  Fax #: [phone]

## 2022-07-20 NOTE — LETTER
2022      RE: Myrtle Strickland  [address]  [facility] [address]     Dear Colleague,    Thank you for the opportunity to participate in the care of your patient, Myrtle Strickland, at the [facility] at [facility]. Please see a copy of my visit note below.        GENETICS CLINIC CONSULTATION     Name:  Myrtle Strickland  :   1995  MRN:   [id number]  Date of service: 2022  Primary Care Provider: No primary care provider on file.  Referring Provider: No ref. provider found    Dear Dr. Kesha Camacho      We had the pleasure of seeing Myrtle in Genetics Clinic today.     Reason for consultation:  A consultation in the [facility] was requested for Myrtle, a 27 year old female, for evaluation of possible OTC deficiency.      Myrtle was accompanied to this visit by her partner. She also saw our genetic counselor and metabolic dietitian at this visit.       History is obtained from electronic health record.    Assessment:    Myrtle Strickland is a 27 year old female with a self reported history of being diagnosed with OTC during childhood. Unfortunately, no biochemical or molecular reports are available for review during today's visit.    We will obtain plasma amino acids, ammonia, CMP and urine orotic acid as a part of biochemical work up. We will also send OTC single gene testing to be done on a genome platform through [facility].  If biochemical or molecular testing confirms OTC, appropriate treatment will be started.  It was also recommended that Myrtle report a 3-day diet history to her metabolic dietitian to assess her baseline protein intake.    If both biochemical as well as molecular testing comes back negative, it would be recommended to repeat a plasma amino acids after higher protein intake.  Brief discussion regarding dietary and medical management for her as well as her son after birth done  during today's visit.  With an extensive family history of multiple cancers, cancer genetic counseling is recommended for her    We will see her back in 1 month or sooner pending test results.  Myrtle verbalized understanding and agreed to the plan. All questions were answered to the best of my knowledge.    Plan:    1. Ordered at this visit:   Orders Placed This Encounter   Procedures     Amino acids plasma quantitative     Ammonia     Comprehensive metabolic panel     Organic Acid Comprehensive Urine     Creatinine random urine     Orotic Acid Urine     Urine Creatinine (Random) for Endocrine Testing     Organic acid comprehensive urine     Orotic acid urine         2. Genetic testing: Prior-auth for NGS (sequencing+del/dup).   3. Genetic counseling consultation with Sahra Aguayo, MS, [facility] to obtain pedigree and obtain consent for genetic testing   4. Follow up: 1 month or sooner pending test results    Update 2022 7 PM:  Juventinos ammonia and liver function came back completely normal.  -----------------------------------    History of Present Illness:  Myrtle Strickland is a 27 year old female with    Patient Active Problem List   Diagnosis     Asperger's disorder     Anxiety disorder     Attention deficit hyperactivity disorder (ADHD)     Benign joint hypermobility     Conversion disorder with seizures or convulsions     Developmental expressive writing disorder     History of asthma      delivery     Orthostatic hypotension     Seizure disorder (H)     Myrtle reports being born 6 weeks prematurely.  She had normal growth and development during infancy.  She reports having dietary protein aversion in late infancy.  She would have symptoms of GI upset and vomiting with excessive protein intake.  She was reportedly diagnosed with OTC at the age of 4-6 years of age at the [facility] following an episode of GI upset following protein intake.  She reports being on a protein restricted diet since then.  She  has never taken Citrulline supplements as a child.  Myrtle does not count protein but try to restrict herself from taking too much protein.  She does not report having any clinical complications with her previous pregnancy and childbirth suggestive of a hyperammonemic episode    There is also history of febrile seizure starting early in childhood.  She reports having seizure-like episodes in the absence of fever when she was in middle school.  Each episode would last for 30 seconds to 1-1/2 minutes and this would involve twitching of her extremities and would be followed by postictal phase.  She was apparently evaluated by neurology and was diagnosed with anxiety which were provoking the seizures.  She has been on Zoloft which helps with her anxiety.    She reports having learning issues in school and being diagnosed with ADHD and autism spectrum disorder.    There is also history of multiple female family members on her mother side of the family with a history of protein intolerance and diagnosis of OTC.  It is unclear if anyone has had a molecular testing being done.    Past Medical History:  Past Medical History:   Diagnosis Date     Depressive disorder      Neurological disorder      Uncomplicated asthma      No significant hospitalizations    Past Surgical History:  History of adenotonsillectomy in early childhood.    Medications:  Current Outpatient Medications   Medication Sig Dispense Refill     metoclopramide (REGLAN) 10 MG tablet metoclopramide 10 mg tablet   Take 1 tablet by mouth three times a day as needed (nausea).       ondansetron (ZOFRAN) 4 MG tablet ondansetron HCl 4 mg tablet   Take 1 tablet by mouth every 6-8 hours as directed. (Patient not taking: Reported on 6/9/2022)       Prenatal MV & Min w/FA-DHA (PRENATAL ADULT GUMMY/DHA/FA PO) Prenatal Gummies       promethazine (PHENERGAN) 25 MG tablet promethazine 25 mg tablet   Take 1 tablet by mouth every 6-8 hours by oral route.       sertraline  (ZOLOFT) 50 MG tablet sertraline 50 mg tablet   Take 1 tablet every day by oral route.         Allergies:  Allergies   Allergen Reactions     Guaifenesin Hives and Nausea and Vomiting     Adhesive Tape Other (See Comments)     Burns only with waterproof bandages. [facility] Tegaderm and tape is okay.  Burns only with waterproof bandages. [facility] Tegaderm and tape is okay.  Burns only with waterproof bandages. [facility] Tegaderm and tape is okay.  Burns only with waterproof bandages. [facility] Tegaderm and tape is okay.       Mushroom      Latex Hives, Itching and Rash       Immunization:  Most Recent Immunizations   Administered Date(s) Administered     DTAP (<7y) 03/23/1999     DTP-Hib 1995     FLU 6-35 months 11/19/2012     Flu, Unspecified 09/04/2009     HPV Quadrivalent 12/08/2008     Hep B, Peds or Adolescent 1995     HepA-ped 2 Dose 06/11/2008     HepB-Adult 1995     Hib (PRP-T) 09/24/1996     Historical DTP/aP 1995     Influenza Vaccine IM > 6 months Valent IIV4 (Alfuria,Fluzone) 11/19/2012     MMR 03/23/1999     Meningococcal (Menactra ) 08/16/2007     Meningococcal (Menomune ) 08/16/2007     OPV, trivalent, live 03/23/1999     TRIHIBIT (DTAP/HIB, <7y) 03/23/1999     UTD: Yes    Diet:  Restricted.  Reports self restriction of protein intake    Care team:  No care team member to display        ROS   ROS: 10 point ROS neg other than the symptoms noted above in the HPI.  Family History:    A detailed pedigree was obtained by the genetic counselor at the time of this appointment and is scanned into the electronic medical record. I personally reviewed and discussed the pedigree with the GC and the family and concur with the GC note. Please refer to the formal pedigree for full details.   No family history on file.    Social History:  Lives with daughter and spouse/partner    Physical Examination:  Last menstrual period 01/21/2022.  /76 (BP Location: Right  "arm, Patient Position: Sitting, Cuff Size: Adult Regular)   Pulse 83   Ht 5' 6.14\" (168 cm)   Wt 148 lb 13 oz (67.5 kg)   LMP 01/21/2022   HC 54.8 cm (21.58\")   BMI 23.92 kg/m        General: WDWN in NAD, appears stated age  Head and Face: NCAT  Ears: Well-formed, normal in position and placement, canals patent  Eyes: Normal in position and placement, EOMI; lids, lashes, and brows unremarkable  Nose: Nares patent  Mouth/Throat: Lips, philtrum, palate, dentition unremarkable  Neck: No pits, tags, fissures  Chest: Symmetric,   Respiratory: Clear to auscultation bilaterally  Cardiovascular: Regular rate and rhythm with no murmur  Abdomen: Deferred  Genitourinary: Deferred  Extremities/Musculoskeletal: Symmetrical; full ROM; hands, feet, nails, palmar and plantar creases unremarkable  Neurologic: Mental status appropriate for age; good tone, strength, and muscle bulk  Skin: Unremarkable    Genetic testing done to date:  N/A    Pertinent lab results:   N/A    Imaging/ procedure results:  N/A  No results found for this or any previous visit (from the past 744 hour(s)).         Thank you for allowing us to participate in the care of Myrtle Strickland. Please do not hesitate to contact us with questions.      90 minutes spent on the date of the encounter doing chart review, history and exam, documentation and further activities per the note           Suzy June MD    Genetics and Metabolism  Pager: [phone]     [employer] ([employer] speech recognition transcription software was used to create portions of this document.  An attempt at proofreading has been made to minimize errors; however, minor errors in transcription may be present. Please call if questions.    Route to  No care team member to display        Please do not hesitate to contact me if you have any questions/concerns.     Sincerely,       Suzy June MD    "

## 2022-07-21 LAB — INTERPRETATION: NORMAL

## 2022-07-22 LAB
(HCYS)2 SERPL-SCNC: 0 UMOL/DL
1ME-HIST SERPL-SCNC: 0 UMOL/DL (ref 0–2)
3ME-HISTIDINE SERPL-SCNC: 0 UMOL/DL (ref 0–1)
AAA SERPL-SCNC: 0 UMOL/DL (ref 0–6)
ALANINE SERPL-SCNC: 0 UMOL/DL
ALANINE SFR SERPL: 25 UMOL/DL (ref 22–62)
AMINO ACID PAT SERPL-IMP: ABNORMAL
ANSERINE SERPL-SCNC: 0 UMOL/DL
ARGININE SERPL-SCNC: 4 UMOL/DL (ref 2–18)
ASPARAGINE SERPL-SCNC: 6 UMOL/DL (ref 1–5)
ASPARTATE SERPL-SCNC: <1 UMOL/DL (ref 0–4)
B-AIB SERPL-SCNC: 0 UMOL/DL
CARNOSINE SERPL-SCNC: 0 UMOL/DL
CITRULLINE SERPL-SCNC: 1.6 UMOL/DL (ref 1.3–6)
CYSTATHIONIN SERPL-SCNC: 0 UMOL/DL
CYSTINE SERPL-SCNC: 4 UMOL/DL (ref 3–15)
GLUTAMATE SERPL-SCNC: 2 UMOL/DL (ref 0–16)
GLUTAMATE SERPL-SCNC: 46 UMOL/DL (ref 41–86)
GLYCINE SERPL-SCNC: 18 UMOL/DL (ref 13–50)
HISTIDINE SERPL-SCNC: 9 UMOL/DL (ref 3–15)
ISOLEUCINE SERPL-SCNC: 4 UMOL/DL (ref 4–11)
LEUCINE SERPL-SCNC: 7 UMOL/DL (ref 8–21)
LYSINE SERPL-SCNC: 14 UMOL/DL (ref 6–26)
METHIONINE SERPL-SCNC: 2 UMOL/DL (ref 1–6)
OH-LYSINE SERPL-SCNC: 0 UMOL/DL
OH-PROLINE SERPL-SCNC: 1 UMOL/DL (ref 0–3)
ORNITHINE SERPL-SCNC: 2 UMOL/DL (ref 2–16)
PHE SERPL-SCNC: 4.4 UMOL/DL (ref 3–10)
PROLINE SERPL-SCNC: 12 UMOL/DL (ref 0–48)
SARCOSINE SERPL-SCNC: 0 UMOL/DL
SERINE SERPL-SCNC: 10 UMOL/DL (ref 4–18)
TAURINE SERPL-SCNC: 3 UMOL/DL (ref 7–32)
THREONINE SERPL-SCNC: 22 UMOL/DL (ref 5–25)
TYROSINE SERPL-SCNC: 3.6 UMOL/DL (ref 4–13)
VALINE SERPL-SCNC: 12 UMOL/DL (ref 8–46)

## 2022-07-22 NOTE — PROGRESS NOTES
Name:  Myrtle Strickland  :   1995  MRN:   [id number]  Date of service: 2022  Primary Provider: No primary care provider on file.  Referring Provider: No ref. 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 Myrtle was accompanied to this visit by her . I met with Myrtle at the request of Dr. June to obtain a family history, discuss possible genetic contributions to her symptoms, and to obtain informed consent for genetic testing.     HPI:  Myrtle is a 27 year old-year old female who is 25w6d pregnant () with a family history of OTC deficiency, seizure disorder, ADHD, Asperger's, asthma, and depression. Her fetus is male, and her pregnancy care is currently managed by [facility]. Myrtle reports that she wants genetic testing to confirm her carrier status, and if she is confirmed a carrier she wants an amniocentesis to confirm if her male fetus is affect to help with the birth plan.      She reports that she was diagnosed with OTC deficiency at age 6 which was managed by a low protein diet. Both her mother and sister were diagnosed with OTC deficiency as well during pregnancy via biochemical testing. Her late brother passed shortly after birth due to unknown causes, Myrtle reports his cord blood may have been tested for OTC deficiency. Myrtle has not received genetic testing for OTC deficiency, and it is unclear if other family members have received genetic testing. No biochemical or molecular reports are available for review during today's visit.      She reports her seizures stress and anxiety induced, and are currently managed with anxiety medication.  Her mother notes that she had febrile seizures in childhood however grew out of those at age [age], and then subsequently had seizures mostly around stress.            Patient Active Problem List  "  Diagnosis     Asperger's disorder     Anxiety disorder     Attention deficit hyperactivity disorder (ADHD)     Benign joint hypermobility     Conversion disorder with seizures or convulsions     Developmental expressive writing disorder     History of asthma      delivery     Orthostatic hypotension     Seizure disorder (H)      Past Medical History:  Past Medical History        Past Medical History:   Diagnosis Date     Depressive disorder       Neurological disorder       Uncomplicated asthma              Family History:   A three generation pedigree was obtained today and scanned into the EMR. The following information was provided:  Children    One daughter, 3 years old, who was born premature at 34w5d. She has sever protein aversion and experiences vomiting when eats a lot of protein.   Siblings    Full siblings: One sister, 22, who was diagnosed with OTC deficiency via biochemical labs, she also had a pregnancy loss at 22 weeks, cause unknown. One brother, who passed away in  shortly after birth of unknown causes, he may have also had OTC deficiency, Myrtle believes his cord blood may have been tested.     Maternal Family    Mother, NICHOLE PEREZ:  Diagnosed with OTC deficiency during pregnancy with Myrtle with biochemical testing, and Myrtle believes her mother may have had genetic testing too but records are unavailable. She also has seizure disorder. Breast cancer at age 11, and \"like 30\" different cancers in her lifetime including stomach, uterine, ovarian, bone, and multiple skin cancers.     Maternal grandfather: passed of emphysema    Maternal grandmother: passed age 80 from protein deficiency. Breast cancer in her 30s.    Maternal aunts/uncles: One aunt who is well. Two identical twin uncles, one who is well, the other who has lung cancer and stomach cancer in his 40s, and a brain tumor unknown type but likely benign.     Maternal cousins: One female cousin from the aunt, who has OTC " deficiency-like symptoms that were first observed while she was pregnant. One female cousin through the uncle who had cancers, who is 7 years old and noticed signs of OTC deficiency-like symptoms at age 4 including severe protein aversion.     Paternal Family    Father, Data Unavailable: substance abuse, bones that break easily, and memory loss believed to be related to the substance abuse.     Paternal grandfather: passed, history of substance abuse.     Paternal grandmother: History of breast cancer in her 20s, diabetic, and has internal heart monitor.    Paternal aunts/uncles: One aunt, health status unknown. One uncle, passed of breast, bone, and brain cancer in his 60s. He also had his first bout of breast cancer in his early 50s.      The family history is otherwise negative for protein aversion, seizures, coma, sudden death, genetic testing, and known genetic disorders. Consanguinity was not assessed.     Discussion and Assessment:  Genetics of OTC Deficiency  As a review, ornithine transcarbamylase (OTC) deficiency is known as a urea cycle disorder. The urea cycle is a biochemical process in the cells of the body that helps convert nitrogen to urea so it can be removed by the body. Different steps of the urea cycle are completed by specialized proteins called enzymes. These enzymes are coded for by our genes.      Genes are long stretches of DNA that are responsible for how our bodies look and how our bodies work. Genes are inherited from the parents. When there is a change, called a variant, in the DNA sequence of a gene it can cause the gene to not work the way it is supposed to. OTC deficiency is caused by variants in a gene called OTC which produces an enzyme important for the urea cycle. Variants therefore disrupt the urea cycle and cause nitrogen in the form of ammonia to build up.  At high levels this is toxic to the body, and can damage the nervous system.      Our genes are inherited on structures  "called chromosomes of which we have 23 pairs. The first 22 pairs of chromosomes are the same in males and females while the final pair of chromosomes, the sex chromosomes (X and Y), are different in males and females. Males have one copy of the X-chromosome and one copy of the Y-chromosome while females have two copies of the X-chromosome. Therefore females have two copies of each gene on the X-chromosome while males have just one. The OTC gene is found on the X-chromosome. Therefore females have two copies of this gene while males have just one.      Because females have two copies of the OTC gene, if they have a variant on one copy of the OTC gene they still have a \"back-up\" copy of the OTC gene without a variant. Males, in contrast, do not have a back-up OTC gene. Therefore if they have a variant in this gene they tend to have more severe symptoms than females. However, some females do have symptoms of OTC deficiency.      Whether or not a female has symptoms is in large part due to something called X-inactivation. This means that in each cell a woman has in her body, only one copy of the X-chromosome is active (turned on and making enzyme) while the other is inactivated (turned off and not making enzyme). If the X-chromosome with the OTC variant is activated/turned on in more cells, she may be more likely to have symptoms.      For a female who carries an OTC variant, there is a 50% chance with each pregnancy that she will pass down the copy of the OTC gene with the variant, and a 50% chance she will pass down her copy of the OTC gene without the variant. Whether a child is male or female depends on the male partner and which sex chromosome he passes on (the X-chromosome or the Y-chromosome).      A male with OTC deficiency also has a 50% chance of passing down his OTC gene variant. Because the OTC gene is on the X-chromosome, if he passes this down all of his female children will inherit the OTC variant. If he " passes down his Y-chromosome which does not have an OTC gene, this child would be a male and he would not have an OTC variant.     Genetic Testing  Genetic testing can look for a DNA change that can lead to OTC deficiency. There are a few possible outcomes to this testing:     - a positive result: This means we found a DNA change in OTC gene. This would be consistent with confirmation of OTC deficiency carrier status. Female carriers of OTC deficiency can still have symptoms of OTC deficiency.   - a negative result: This means that we did not find a DNA change in OTC gene. Currently, genetic testing can detect a DNA change in 90% of OTC cases cases so a negative result does not rule out the possibility of OTC deficiency.   - a variant of unknown significance (VUS) result: This means that we found a DNA change but little is known about this specific DNA change to determine whether it can cause OTC deficiency. As we learn more about genetics of OTC deficiency, VUS results could be re-classified as either a positive or negative result therefore it is important to check back in with genetics every few years in the event of a VUS result.      Management and surveillance of Myrtle will depend on the genetic test results. It can also help predict the recurrence risk for Myrtle and other family members to have children with similar healthcare needs.     Prior Authorization and Initiating Testing  Insurance prior authorization and billing procedures were covered with the family. Our prior authorization process takes 2 to 4 weeks, at which time the family will be contacted with the determination.      Results are expected in 4-6 weeks. I will call Myrtle when we have results and we will schedule a follow-up visit if needed at that time. Myrtle is encouraged to reach out to me with any questions in the meantime.      Family History of Cancer  The family history of multiple primary cancers in an individual, breast cancers at an  "early age, and male breast cancer are concerning for an inherited genetic risk for cancer. While the majority of cancer is sporadic in nature, some families carry a genetic predisposition to cancer. These families have a clustering of cancer in the family and/or early ages of onset. Along with the cancer types already seen in the family, the risk for other types of cancer may also be increased. We discussed the purpose of genetic testing and the changes in management that could be made based on genetic testing, personal, and family history. We discussed that the best individual to test is an individual who has been diagnosed with cancer. Local cancer genetic counseling testing is available through the Cancer Risk Management Program (contact information below).  They can ask for a referral from their primary care provider. It is also important to continue to inform healthcare providers about the family history of cancer so they can recommend appropriate screening and management.     Cancer Risk Management  ? [phone]  ? https://scrible.org/treatments/cancer-risk-management-program     If relatives are not local and need assistance finding a genetic counselor in their area, they can use the www.nsgc.org \"find a genetic counselor\" link.     Plan:  1. A blood sample was collected and sent to [facility] at the [facility] for OTC gene sequencing with deletion/duplication analysis on a genome backbone pending insurance authorization.     2. After testing is initiated, results will be returned by phone in 4-6 weeks and we will schedule a follow-up appointment according to Dr. June.     3. Contact information was provided should any questions arise in the future.        Agustina Cartwright MS Genetic Counseling Program Student   [facility]     Sahra Aguayo MS, [facility]  Genetic Counselor  ALEX Anderson  Phone: [phone]        Approximate Time Spent in Consultation: " 60 min     CC: No Letter

## 2022-07-26 LAB
2ME-CITRATE/CREAT UR: 0 UG/MG CR (ref 0–4)
2OH-ISOCAPROATE/CREAT UR: 0 UG/MG CR (ref 0–4)
3-OH 3ME GLUTARIC, UR: 0 UG/MG CR (ref 0–40)
3ME-CROTONYLGLYCINE/CREAT UR: 0 UG/MG CR (ref 0–4)
3OH-ISOVALERATE/CREAT UR: 0 UG/MG CR (ref 0–50)
3OH-PROPIONATE/CREAT UR: 0 UG/MG CR (ref 0–4)
4OH-PHENYLLACTATE/CREAT UR-RTO: 0 UG/MG CR (ref 0–15)
4OH-PHENYLPYRUVATE/CREAT UR-SRTO: 0 UG/MG CR (ref 0–28)
5OH-HEXANOATE/CREAT UR: 0 UG/MG CR (ref 0–6)
5OXOPROLINE/CREAT UR: 0 UG/MG CR (ref 0–70)
7OH-OCTANOATE/CREAT UR-SRTO: 0 UG/MG CR (ref 0–4)
A-KETOGLUT/CREAT UR: 0 UG/MG CR (ref 0–476)
A-OH-BUTYR/CREAT UR: 0 UG/MG CR (ref 0–4)
ACETOACET/CREAT UR: 0 UG/MG CR (ref 0–6)
ADIPATE/CREAT UR: 0 UG/MG CR (ref 0–29)
B-OH-BUTYR/CREAT UR: 0 UG/MG CR (ref 0–15)
CITRATE/CREAT UR: 0 UG/MG CR (ref 0–1500)
DEPRECATED N-AC-ASP/CREAT UR: 0 UG/MG CR (ref 0–4)
ETHYLMALONATE/CREAT 24H UR: 0 UG/MG CR (ref 0–21)
FUMARATE/CREAT UR: 0 UG/MG CR (ref 0–10)
G-OH-BUTYR/CREAT UR: 0 UG/MG CR (ref 0–4)
GLUTARATE/CREAT UR: 0 UG/MG CR (ref 0–20)
GLUTARATE/CREAT UR: 0 UG/MG CR (ref 0–4)
GLUTARATE/CREAT UR: 0 UG/MG CR (ref 0–6)
GLYCERATE/CREAT UR: 0 UG/MG CR (ref 0–4)
GLYOXYLATE/CREAT UR: 0 UG/MG CR (ref 0–59)
HEXANOYLGLY/CREAT UR: 0 UG/MG CR (ref 0–4)
ISOCITRATE/CREAT UR: 0 UG/MG CR (ref 0–140)
ISOVALERYLGLY/CREAT UR: 0 UG/MG CR (ref 0–10)
LACTATE/CREAT UR: 0 UG/MG CR (ref 0–132)
METHYLMALONATE/CREAT UR: 0 UG/MG CR (ref 0–14)
ORGANIC ACIDS PATTERN UR-IMP: NORMAL
ORGANIC ACIDS UR-MCNC: 0 UG/MG CR (ref 0–4)
OXALATE/CREAT UR: 0 UG/MG CR (ref 0–300)
PHENYLACETATE/CREAT UR-SRTO: 0 UG/MG CR (ref 0–4)
PHENYLACETATE/CREAT UR: 0 UG/MG CR (ref 0–325)
PHENYLLACTATE/CREAT UR: 0 UG/MG CR (ref 0–4)
PHENYLPYRUVATE/CREAT UR: 0 UG/MG CR (ref 0–4)
PPG/CREAT UR: 0 UG/MG CR (ref 0–4)
PROPIONYLGLY/CREAT UR: 0 UG/MG CR (ref 0–4)
PYRUVATE/CREAT UR: 0 UG/MG CR (ref 0–40)
SEBACATE/CREAT UR: 0 UG/MG CR (ref 0–4)
SUBERATE/CREAT UR: 0 UG/MG CR (ref 0–19)
SUBERYLGLY/CREAT UR: 0 UG/MG CR (ref 0–4)
SUCCINATE/CREAT UR: 0 UG/MG CR (ref 0–120)
TIGLYLGLY/CREAT UR: 0 UG/MG CR (ref 0–10)

## 2022-07-29 LAB — OROTATE/CREAT UR-SRTO: 1.06 MMOL/MOL CR (ref 0.2–1.5)

## 2022-08-17 ENCOUNTER — LAB (OUTPATIENT)
Dept: LAB | Facility: CLINIC | Age: 27
End: 2022-08-17
Payer: COMMERCIAL

## 2022-08-17 ENCOUNTER — TELEPHONE (OUTPATIENT)
Dept: LAB | Facility: CLINIC | Age: 27
End: 2022-08-17

## 2022-08-17 DIAGNOSIS — Z71.83 ENCOUNTER FOR NONPROCREATIVE GENETIC COUNSELING: Primary | ICD-10-CM

## 2022-08-17 DIAGNOSIS — Z84.89 FAMILY HISTORY OF SUDDEN DEATH IN BROTHER: ICD-10-CM

## 2022-08-17 DIAGNOSIS — E72.4 OTC (ORNITHINE TRANSCARBAMYLASE DEFICIENCY) (H): ICD-10-CM

## 2022-08-17 PROCEDURE — 81479 UNLISTED MOLECULAR PATHOLOGY: CPT | Performed by: PEDIATRICS

## 2022-08-17 PROCEDURE — 81405 MOPATH PROCEDURE LEVEL 6: CPT | Performed by: PEDIATRICS

## 2022-08-17 NOTE — PROGRESS NOTES
Notified Myrtle that we received prior authorization approval for her genetic testing. Valid from 7/20/2022 to 10/19/2022. Authorization number is [id number].     Explained that insurance benefits may still apply, therefore, there could be an out of pocket cost. Provided her with estimated out of pocket cost for testing.    Myrtle expressed understanding and stated that she wants to proceed with testing. We will call when results are available. Myrtle had no further questions. [facility] Hold For Preauthorization: [[id number]] order was placed by a genetics provider.      CORTES Archer  Genomics Billing    [facility]   Molecular Diagnostics [facility]  [address] [place] [id number]  [phone]

## 2022-08-18 ENCOUNTER — VIRTUAL VISIT (OUTPATIENT)
Dept: CONSULT | Facility: CLINIC | Age: 27
End: 2022-08-18
Attending: PEDIATRICS
Payer: COMMERCIAL

## 2022-08-18 ENCOUNTER — VIRTUAL VISIT (OUTPATIENT)
Dept: CONSULT | Facility: CLINIC | Age: 27
End: 2022-08-18
Attending: FAMILY MEDICINE
Payer: COMMERCIAL

## 2022-08-18 DIAGNOSIS — Z71.83 ENCOUNTER FOR NONPROCREATIVE GENETIC COUNSELING: Primary | ICD-10-CM

## 2022-08-18 DIAGNOSIS — E72.4 OTC (ORNITHINE TRANSCARBAMYLASE DEFICIENCY) (H): Primary | ICD-10-CM

## 2022-08-18 DIAGNOSIS — E72.4 OTC (ORNITHINE TRANSCARBAMYLASE DEFICIENCY) (H): ICD-10-CM

## 2022-08-18 PROCEDURE — G0463 HOSPITAL OUTPT CLINIC VISIT: HCPCS | Mod: PN,RTG | Performed by: PEDIATRICS

## 2022-08-18 PROCEDURE — 99215 OFFICE O/P EST HI 40 MIN: CPT | Mod: 95 | Performed by: PEDIATRICS

## 2022-08-18 PROCEDURE — 96040 HC GENETIC COUNSELING, EACH 30 MINUTES: CPT | Mod: GT,95 | Performed by: GENETIC COUNSELOR, MS

## 2022-08-18 PROCEDURE — G0452 MOLECULAR PATHOLOGY INTERPR: HCPCS | Mod: 26 | Performed by: PATHOLOGY

## 2022-08-18 NOTE — PROGRESS NOTES
"Myrtle Strickland  is being evaluated via a billable video visit.      How would you like to obtain your AVS? [facility]  For the video visit, send the invitation by: Text to cell phone: [phone]  Will anyone else be joining your video visit? No        Video-Visit Details    Type of service:  Video Visit    Video Start Time: 3:56 PM  Video End Time (time video stopped): 4:30 PM  Originating Location (pt. Location): Home    Distant Location (provider location):  [facility]    Mode of Communication:  Video Conference via [facility]                       OUTPATIENT METABOLIC FOLLOW UP          Date: 2022      Patient:  Myrtle Strickland   :   1995   MRN:     [id number]      Myrtle Strickland  [address]  [place] [facility] [address]    Dear Dr. Galindo Not In System and Myrtle Strickland,    Thank you for sending Myrtle Strickland to the [facility] Monday \"Metabolic clinic\" for consultation and treatment of:    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)  No definite biochemical evidence    PAST MEDICAL HISTORY:    From the oral history, and medical records that are available, these items are noted:    Patient Active Problem List   Diagnosis     Asperger's disorder     Anxiety disorder     Attention deficit hyperactivity disorder (ADHD)     Benign joint hypermobility     Conversion disorder with seizures or convulsions     Developmental expressive writing disorder     History of asthma      delivery     Orthostatic hypotension     Seizure disorder (H)     Myrtle reports being born 6 weeks prematurely.  She had normal growth and development during infancy.  She reports having dietary protein aversion in late infancy.  She would have symptoms of GI upset and vomiting with excessive protein intake.  She was reportedly diagnosed with OTC at the age of 4-6 years of age at the [facility] following an episode " of GI upset following protein intake.  She reports being on a protein restricted diet since then.  She has never taken Citrulline supplements as a child.  Myrtle does not count protein but try to restrict herself from taking too much protein.  She does not report having any clinical complications with her previous pregnancy and childbirth suggestive of a hyperammonemic episode     There is also history of febrile seizure starting early in childhood.  She reports having seizure-like episodes in the absence of fever when she was in middle school.  Each episode would last for 30 seconds to 1-1/2 minutes and this would involve twitching of her extremities and would be followed by postictal phase.  She was apparently evaluated by neurology and was diagnosed with anxiety which were provoking the seizures.  She has been on Zoloft which helps with her anxiety.     She reports having learning issues in school and being diagnosed with ADHD and autism spectrum disorder.     There is also history of multiple female family members on her mother side of the family with a history of protein intolerance and diagnosis of OTC.  It is unclear if anyone has had a molecular testing being done.    Interval History:  She had biochemical testing done following the initial visit which showed normal ammonia, urine orotic acid, CMP and urine organic acid.  Her plasma amino acid showed normal Citrulline level, though it is on the lower end of normal at 1.6 (reference: 1.3-6 umol/dL).  A 3-day diet record that was obtained showed a dietary intake of 0.75 g/kg/day (DRI: 0.8g/kg/day).He OTC sequencing and del/dup analysis done on a genome platform did not show any pathogenic/likely path or VUSes in exonic or intronic region.    Myrtle reports having hyperemesis currently. She is in her 29th week of gestation now. She also reports that she does not remember the name of the hospital or its geographic location where she was diagnosed. It was not  [facility]. However she will enquire this with her mother and update us. Both Myrtle and her sister were diagnosed at the same place at the same time. Her brother who passed away had his care at [facility] [place].        Medications:  Current Outpatient Medications   Medication Sig     metoclopramide (REGLAN) 10 MG tablet metoclopramide 10 mg tablet   Take 1 tablet by mouth three times a day as needed (nausea). (Patient not taking: Reported on 7/20/2022)     ondansetron (ZOFRAN) 4 MG tablet ondansetron HCl 4 mg tablet   Take 1 tablet by mouth every 6-8 hours as directed. (Patient not taking: Reported on 6/9/2022)     Prenatal MV & Min w/FA-DHA (PRENATAL ADULT GUMMY/DHA/FA PO) Prenatal Gummies     promethazine (PHENERGAN) 25 MG tablet promethazine 25 mg tablet   Take 1 tablet by mouth every 6-8 hours by oral route.     sertraline (ZOLOFT) 50 MG tablet sertraline 50 mg tablet   Take 1 tablet every day by oral route.     No current facility-administered medications for this visit.       Allergies:  Allergies   Allergen Reactions     Guaifenesin Hives and Nausea and Vomiting     Adhesive Tape Other (See Comments)     Burns only with waterproof bandages. \[facility]\"" hospital Tegaderm and tape is okay.  Burns only with waterproof bandages. \[facility]\"" hospital Tegaderm and tape is okay.  Burns only with waterproof bandages. \[facility]\"" hospital Tegaderm and tape is okay.  Burns only with waterproof bandages. \[facility]\"" hospital Tegaderm and tape is okay.       Mushroom      Latex Hives, Itching and Rash       Physical Examination:  Last menstrual period 01/21/2022.  Weight %tile:Facility age limit for growth percentiles is 20 years.  Height %tile: Facility age limit for growth percentiles is 20 years.  Head Circumference %tile: Facility age limit for growth percentiles is 20 years.  BMI %tile: No height and weight on file for this encounter.    FAMILY HISTORY: A brief family medical history was reviewed.  REVIEW OF SYSTEMS: The review of  systems negative for new eye, ear, heart, lung, liver, spleen, gastrointestinal, bone, muscle, integumentary, endocrinologic, brain or psychiatric issues except as noted above.    PHYSICAL EXAMINATION:   General: The patient is oriented to person, place and time at an age-appropriate manner.   HEENT: The facial features are normal and symmetric. The ears are of normal position and configuration and hearing is grossly normal.  Neck: The neck appears to have full range of motion  Chest: Does not appear to be tachypneic or in any respiratory distress.  Heart:  Myrtle Strickland  appears well perfused.  Abdomen: Not examined.  Extremities: The extremities are of normal configuration without contractures nor hyperlaxities.  Back: Not examined.   Integument: The visible part of the integument is of normal appearance without significant changes in pigmentation, birthmarks, or lesions.  Neuromuscular:  Mental Status Exam: Alert, awake. Fully oriented. No dysarthria, no dysphasia. Speech of normal fluency.  Appears to have normal strength.       LABORATORY RESULTS: Laboratory studies from the past year were reviewed.   Latest Reference Range & Units 07/20/22 15:55   Ammonia 10 - 50 umol/L 11      Latest Reference Range & Units 07/20/22 15:55   Orotic Acid Urine 0.20 - 1.50 mmol/mol Cr 1.06      Latest Reference Range & Units 07/20/22 15:55   Arginine 2 - 18 umol/dL 4   Citrulline 1.3 - 6.0 umol/dL 1.6   Glutamine 41 - 86 umol/dL 46   Glycine 13 - 50 umol/dL 18   Isoleucine 4 - 11 umol/dL 4   Leucine 8 - 21 umol/dL 7 (L)   Valine 8 - 46 umol/dL 12   (L): Data is abnormally low  ASSESSMENT:  1. History of clinical diagnosis of OTC deficiency without any evidence supported by biochemical or molecular analysis  2. Normal OTC sequencing and deletion/duplication analysis (on a genome platform evaluating both exonic and intronic variants)  3. Self restricted protein intake at 0.75 g/kg/day  4. Normal ammonia, urine orotic acid  5. Low  normal plasma citrulline that could be attributed to the protein restriction   6. In females, diagnosis of OTC cannot be done just based on the biochemical parameters since it may be normal  7. As per the literature, 10-15% of patients with OTC may not have a molecular diagnosis. Recent articles looking into the possibility of intronic variants vs modifying factors vs alternate genes in these 10-15% of the patients are not available    PLAN/RECOMMENDATIONS:  1. At this time, we do not have any definite evidence of OTC in Myrtle from biochemical or molecular analysis. However, with the strong clinical history of hyperammonemia and protein intolerance, the possibility of Myrtle being in the 10-15% described above cannot be ruled out  2. We will contact the hospitals where Myrtle, her sister and mother have been admitted to obtain medical records that will help us evaluate what testing has been done previously and how severe her hyperammonemia was  3. For now, it is recommended that Myrtle be admitted to the [facility] for labor and delivery so that she can be appropriately monitored and managed with proper IV fluids by Metabolism  4. Metabolism on call provider will also assist in the appropriate monitoring and management of Myrtle's son after birth  5. An emergency letter and surgery/labor and delivery recommendation letter will be placed in the chart   6. Myrtle's previous plasma amino acids showed multiple amino acids that were low including tyrosine, threonine, leucine and low normal citrulline. These are suggestive of low protein intake. It is recommend that Myrtle increase her protein intake especially during pregnancy  7. We will see Myrtle back in the clinic as an in person visit in 1 month     FOLLOW-UP INSTRUCTIONS FOR THE PATIENT:  If you are returning to clinic to review specific laboratory tests, please call the Metabolic Nurse (see phone numbers below)  to confirm that we have received all of  the results from reference laboratories prior to your appointment. If we have not received all of the test results, please discuss re-scheduling your appointment.    With warmest regards,       Suzy June MD     Division of Genetics and Metabolism    Appointments: [phone]      Monday afternoons: Metabolic/Lysosomal storage clinic              [facility]: [phone]/ [phone]               [facility] laboratory: [phone]    Nurse Coordinator, Metabolism and Genetics:  Carrie rCystal RN, [phone]    Pharmacotherapy Consultant:  Wilfrido Fleming, PharmD, Pharmacotherapy for Metabolic Disorders ([facility]): [phone]    Genetic Counselor:  Sahra Aguayo MS, [facility] (Genetic test Results): [phone]    Metabolic Dietician:  Suzette Barrera, Registered Dietician: [phone]    [facility] Clinic Scheduler:  Esther Suarez, [phone]    Copies to:      Provider Not In System       Myrtle Strickland  [address]  [facility] [address]     No referring provider defined for this encounter.      60 minutes spent on the date of the encounter doing chart review, history and exam, documentation and further activities per the note

## 2022-08-18 NOTE — PROGRESS NOTES
Myrtle Strickland  is being evaluated via a billable video visit.      How would you like to obtain your AVS? [employer]  For the video visit, send the invitation by: Text to cell phone: [phone]  Will anyone else be joining your video visit? No

## 2022-08-18 NOTE — LETTER
"2022      RE: Myrtle Strickland  [address]  [facility] [address]     Dear Colleague,    Thank you for the opportunity to participate in the care of your patient, Myrtle Strickland, at the [facility] at [facility]. Please see a copy of my visit note below.                         OUTPATIENT METABOLIC FOLLOW UP          Date: 2022      Patient:  Myrtle Strickland   :   1995   MRN:     [id number]      Myrtle Strickland  [address]  [facility] [address]    Dear Dr. 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  She had normal growth and development during infancy.  She reports having dietary protein aversion in late infancy.  She would have symptoms of GI upset and vomiting with excessive protein intake.  She was reportedly diagnosed with OTC at the age of 4-6 years of age at the [facility] following an episode of GI upset following protein intake.  She reports being on a protein restricted diet since then.  She has never taken Citrulline supplements as a child.  Myrtle does not count protein but try to restrict herself from " taking too much protein.  She does not report having any clinical complications with her previous pregnancy and childbirth suggestive of a hyperammonemic episode     There is also history of febrile seizure starting early in childhood.  She reports having seizure-like episodes in the absence of fever when she was in middle school.  Each episode would last for 30 seconds to 1-1/2 minutes and this would involve twitching of her extremities and would be followed by postictal phase.  She was apparently evaluated by neurology and was diagnosed with anxiety which were provoking the seizures.  She has been on Zoloft which helps with her anxiety.     She reports having learning issues in school and being diagnosed with ADHD and autism spectrum disorder.     There is also history of multiple female family members on her mother side of the family with a history of protein intolerance and diagnosis of OTC.  It is unclear if anyone has had a molecular testing being done.    Interval History:  She had biochemical testing done following the initial visit which showed normal ammonia, urine orotic acid, CMP and urine organic acid.  Her plasma amino acid showed normal Citrulline level, though it is on the lower end of normal at 1.6 (reference: 1.3-6 umol/dL).  A 3-day diet record that was obtained showed a dietary intake of 0.75 g/kg/day (DRI: 0.8g/kg/day).He OTC sequencing and del/dup analysis done on a genome platform did not show any pathogenic/likely path or VUSes in exonic or intronic region.    Myrtle reports having hyperemesis currently. She is in her 29th week of gestation now. She also reports that she does not remember the name of the hospital or its geographic location where she was diagnosed. It was not [place]. However she will enquire this with her mother and update us. Both Myrtle and her sister were diagnosed at the same place at the same time. Her brother who passed away had his care at [facility]  [place].        Medications:  Current Outpatient Medications   Medication Sig     metoclopramide (REGLAN) 10 MG tablet metoclopramide 10 mg tablet   Take 1 tablet by mouth three times a day as needed (nausea). (Patient not taking: Reported on 7/20/2022)     ondansetron (ZOFRAN) 4 MG tablet ondansetron HCl 4 mg tablet   Take 1 tablet by mouth every 6-8 hours as directed. (Patient not taking: Reported on 6/9/2022)     Prenatal MV & Min w/FA-DHA (PRENATAL ADULT GUMMY/DHA/FA PO) Prenatal Gummies     promethazine (PHENERGAN) 25 MG tablet promethazine 25 mg tablet   Take 1 tablet by mouth every 6-8 hours by oral route.     sertraline (ZOLOFT) 50 MG tablet sertraline 50 mg tablet   Take 1 tablet every day by oral route.     No current facility-administered medications for this visit.       Allergies:  Allergies   Allergen Reactions     Guaifenesin Hives and Nausea and Vomiting     Adhesive Tape Other (See Comments)     Burns only with waterproof bandages. [facility] hospital Tegaderm and tape is okay.  Burns only with waterproof bandages. [facility] hospital Tegaderm and tape is okay.  Burns only with waterproof bandages. [facility] hospital Tegaderm and tape is okay.  Burns only with waterproof bandages. [facility] hospital Tegaderm and tape is okay.       Mushroom      Latex Hives, Itching and Rash       Physical Examination:  Last menstrual period 01/21/2022.  Weight %tile:Facility age limit for growth percentiles is 20 years.  Height %tile: Facility age limit for growth percentiles is 20 years.  Head Circumference %tile: Facility age limit for growth percentiles is 20 years.  BMI %tile: No height and weight on file for this encounter.    FAMILY HISTORY: A brief family medical history was reviewed.  REVIEW OF SYSTEMS: The review of systems negative for new eye, ear, heart, lung, liver, spleen, gastrointestinal, bone, muscle, integumentary, endocrinologic, brain or psychiatric issues except as noted above.    PHYSICAL EXAMINATION:    General: The patient is oriented to person, place and time at an age-appropriate manner.   HEENT: The facial features are normal and symmetric. The ears are of normal position and configuration and hearing is grossly normal.  Neck: The neck appears to have full range of motion  Chest: Does not appear to be tachypneic or in any respiratory distress.  Heart:  Myrtle Strickland  appears well perfused.  Abdomen: Not examined.  Extremities: The extremities are of normal configuration without contractures nor hyperlaxities.  Back: Not examined.   Integument: The visible part of the integument is of normal appearance without significant changes in pigmentation, birthmarks, or lesions.  Neuromuscular:  Mental Status Exam: Alert, awake. Fully oriented. No dysarthria, no dysphasia. Speech of normal fluency.  Appears to have normal strength.       LABORATORY RESULTS: Laboratory studies from the past year were reviewed.   Latest Reference Range & Units 07/20/22 15:55   Ammonia 10 - 50 umol/L 11      Latest Reference Range & Units 07/20/22 15:55   Orotic Acid Urine 0.20 - 1.50 mmol/mol Cr 1.06      Latest Reference Range & Units 07/20/22 15:55   Arginine 2 - 18 umol/dL 4   Citrulline 1.3 - 6.0 umol/dL 1.6   Glutamine 41 - 86 umol/dL 46   Glycine 13 - 50 umol/dL 18   Isoleucine 4 - 11 umol/dL 4   Leucine 8 - 21 umol/dL 7 (L)   Valine 8 - 46 umol/dL 12   (L): Data is abnormally low  ASSESSMENT:  1. History of clinical diagnosis of OTC deficiency without any evidence supported by biochemical or molecular analysis  2. Normal OTC sequencing and deletion/duplication analysis (on a genome platform evaluating both exonic and intronic variants)  3. Self restricted protein intake at 0.75 g/kg/day  4. Normal ammonia, urine orotic acid  5. Low normal plasma citrulline that could be attributed to the protein restriction   6. In females, diagnosis of OTC cannot be done just based on the biochemical parameters since it may be normal  7. As per  the literature, 10-15% of patients with OTC may not have a molecular diagnosis. Recent articles looking into the possibility of intronic variants vs modifying factors vs alternate genes in these 10-15% of the patients are not available    PLAN/RECOMMENDATIONS:  1. At this time, we do not have any definite evidence of OTC in Myrtle from biochemical or molecular analysis. However, with the strong clinical history of hyperammonemia and protein intolerance, the possibility of Myrtle being in the 10-15% described above cannot be ruled out  2. We will contact the hospitals where Myrtle, her sister and mother have been admitted to obtain medical records that will help us evaluate what testing has been done previously and how severe her hyperammonemia was  3. For now, it is recommended that Myrtle be admitted to the [facility] for labor and delivery so that she can be appropriately monitored and managed with proper IV fluids by Metabolism  4. Metabolism on call provider will also assist in the appropriate monitoring and management of Myrtle's son after birth  5. An emergency letter and surgery/labor and delivery recommendation letter will be placed in the chart   6. Myrtle's previous plasma amino acids showed multiple amino acids that were low including tyrosine, threonine, leucine and low normal citrulline. These are suggestive of low protein intake. It is recommend that Myrtle increase her protein intake especially during pregnancy  7. We will see Myrtle back in the clinic as an in person visit in 1 month     FOLLOW-UP INSTRUCTIONS FOR THE PATIENT:  If you are returning to clinic to review specific laboratory tests, please call the Metabolic Nurse (see phone numbers below)  to confirm that we have received all of the results from reference laboratories prior to your appointment. If we have not received all of the test results, please discuss re-scheduling your appointment.    With warmest regards,       Suzy  Shaylee CARROLL     Division of Genetics and Metabolism    Appointments: [phone]      Monday afternoons: Metabolic/Lysosomal storage clinic              [facility]: [phone]/ [phone]               [facility] laboratory: [phone]    Nurse Coordinator, Metabolism and Genetics:  Carrie Crystal RN, [phone]    Pharmacotherapy Consultant:  Wilfrido Fleming, PharmD, Pharmacotherapy for Metabolic Disorders (PIMD): [phone]    Genetic Counselor:  Sahra Aguayo MS, [facility] (Genetic test Results): [phone]    Metabolic Dietician:  Suzette Barrera, Registered Dietician: [phone]    [facility] Clinic Scheduler:  Esther Suarez, [phone]    Copies to:      Provider Not In System       Myrtle Strickland  [address]  [facility] [address]    Dr. Grant referring provider defined for this encounter.      60 minutes spent on the date of the encounter doing chart review, history and exam, documentation and further activities per the note

## 2022-08-19 ENCOUNTER — TELEPHONE (OUTPATIENT)
Dept: CONSULT | Facility: CLINIC | Age: 27
End: 2022-08-19

## 2022-08-19 NOTE — TELEPHONE ENCOUNTER
Reached out to patient regarding scheduling Genetics follow-up with Dr. Suzy June. Patient was busy during call, but will call back to set up appointment. When patient calls back, please assist in scheduling in person Genetics follow-up in 4-5 weeks, with GC visit 30 minutes prior. Thank you.

## 2022-08-19 NOTE — PROGRESS NOTES
Name:  Myrtle Strickland  :   1995  MRN:   [id number]  Date of service: Aug 18, 2022  Primary Provider: System, Provider Not In  Referring Provider: No ref. provider found    Presenting Information:  Myrtle is a 27 year old female, who returns to the [facility] for a video visit regarding her clinical diagnosis of OTC deficiency. I met with Myrtle to discuss her recent genetic testing results.        Relevant Medical History:  Myrtle is a 27 year old-year old female who is 29w6d pregnant () with a family history of OTC deficiency, seizure disorder, ADHD, Asperger's, asthma, and depression. Her fetus is male, and her pregnancy care is currently managed by [facility].      She reports that she was diagnosed with OTC deficiency at age 6 which was managed by a low protein diet. Both her mother and sister were diagnosed with OTC deficiency as well during pregnancy via biochemical testing. Her late brother passed shortly after birth due to unknown causes, Myrtle reports his cord blood may have been tested for OTC deficiency.  She reports her seizures stress and anxiety induced, and are currently managed with anxiety medication.  Her mother notes that she had febrile seizures in childhood however grew out of those at age [age], and then subsequently had seizures mostly around stress.              Patient Active Problem List   Diagnosis     Asperger's disorder     Anxiety disorder     Attention deficit hyperactivity disorder (ADHD)     Benign joint hypermobility     Conversion disorder with seizures or convulsions     Developmental expressive writing disorder     History of asthma      delivery     Orthostatic hypotension     Seizure disorder (H)      Past Medical History:  Past Medical History           Past Medical History:   Diagnosis Date     Depressive disorder       Neurological disorder       Uncomplicated asthma          Myrtle was previously seen at  "the [facility] in July 2022. OTC gene analysis on genome backbone was recommended and returned negative. Refer to Dr. June's note for a more detailed personal history.    Previous Genetic Testing:         Family History:  A three generation pedigree was previously obtained and scanned into the EMR. See scanned pedigree in Media tab. The following information was previously provided:    A three generation pedigree was obtained today and scanned into the EMR. The following information was provided:  Children    One daughter, 3 years old, who was born premature at 34w5d. She has sever protein aversion and experiences vomiting when eats a lot of protein.   Siblings    Full siblings: One sister, 22, who was diagnosed with OTC deficiency via biochemical labs, she also had a pregnancy loss at 22 weeks, cause unknown. One brother, who passed away in 1997 shortly after birth of unknown causes, he may have also had OTC deficiency, Myrtle believes his cord blood may have been tested.     Maternal Family    Mother, NICHOLE PEREZ:  Diagnosed with OTC deficiency during pregnancy with Myrtle with biochemical testing, and Myrtle believes her mother may have had genetic testing too but records are unavailable. She also has seizure disorder. Breast cancer at age 11, and \"like 30\" different cancers in her lifetime including stomach, uterine, ovarian, bone, and multiple skin cancers.     Maternal grandfather: passed of emphysema    Maternal grandmother: passed age 80 from protein deficiency. Breast cancer in her 30s.    Maternal aunts/uncles: One aunt who is well. Two identical twin uncles, one who is well, the other who has lung cancer and stomach cancer in his 40s, and a brain tumor unknown type but likely benign.     Maternal cousins: One female cousin from the aunt, who has OTC deficiency-like symptoms that were first observed while she was pregnant. One female cousin through the uncle who had cancers, " who is 7 years old and noticed signs of OTC deficiency-like symptoms at age 4 including severe protein aversion.     Paternal Family    Father, Data Unavailable: substance abuse, bones that break easily, and memory loss believed to be related to the substance abuse.     Paternal grandfather: passed, history of substance abuse.     Paternal grandmother: History of breast cancer in her 20s, diabetic, and has internal heart monitor.    Paternal aunts/uncles: One aunt, health status unknown. One uncle, passed of breast, bone, and brain cancer in his 60s. He also had his first bout of breast cancer in his early 50s.      The family history is otherwise negative for protein aversion, seizures, coma, sudden death, genetic testing, and known genetic disorders.       Discussion and Assessment:  We reviewed Myrtle's genetic testing results today. Her OTC gene analysis on [employer] was completed at [facility] the [facility]. These results were completely negative/normal. No disease-causing or uncertain changes were identified in the OTC gene. This result rules out many potential genetic causes for Myrtle's concerns. However, it is still possible that her features are due to a genetic factor not analyzed by this particular test. Approximately 5-10% of individuals with OTC defiency do not have an identifiable pathogenic variant on typical sequencing and deletion/duplication analysis.     We reviewed that because a variant was not identified on Myrtle's testing, we cannot offer testing for her child via amniocentesis or after birth. However, her son will be closely monitored after birth for signs of OTC deficiency. Refer to Dr. June's note for more details regarding this plan.     We are still trying to get more information about Myrtle's initial diagnosis and episodes. We will obtain an ANUPAM to attempt to get these records.     Myrtle did not have additional question at this time. She is  encouraged to reach out if questions do come up.       Plan:  1. Myrtle's negative OTC gene analysis was reviewed today.  2. We will attempt to get yMrtle's medical records from around the time of her diagnosis.  3. Additional follow-up as recommended by Dr. June.       Sahra Aguayo, [facility]  Genetic Counselor  [facility]   Phone: [phone]          Approximate Time Spent in Consultation: 20 min     CC: No Letter

## 2022-08-24 ENCOUNTER — HOSPITAL ENCOUNTER (INPATIENT)
Facility: CLINIC | Age: 27
LOS: 2 days | Discharge: HOME | End: 2022-08-26
Attending: OBSTETRICS & GYNECOLOGY | Admitting: OBSTETRICS & GYNECOLOGY
Payer: COMMERCIAL

## 2022-08-24 PROBLEM — O47.00 PRETERM CONTRACTIONS: Status: ACTIVE | Noted: 2022-08-24

## 2022-08-24 LAB
ABO/RH(D): ABNORMAL
AMMONIA PLAS-SCNC: 11 UMOL/L (ref 10–50)
AMMONIA PLAS-SCNC: <10 UMOL/L (ref 10–50)
ANION GAP SERPL CALCULATED.3IONS-SCNC: 7 MMOL/L (ref 3–14)
ANTIBODY ID: NORMAL
ANTIBODY SCREEN: POSITIVE
BASOPHILS # BLD AUTO: 0 10E3/UL (ref 0–0.2)
BASOPHILS NFR BLD AUTO: 0 %
BUN SERPL-MCNC: 5 MG/DL (ref 7–30)
CALCIUM SERPL-MCNC: 7.4 MG/DL (ref 8.5–10.1)
CHLORIDE BLD-SCNC: 108 MMOL/L (ref 94–109)
CO2 SERPL-SCNC: 22 MMOL/L (ref 20–32)
CREAT SERPL-MCNC: 0.64 MG/DL (ref 0.52–1.04)
EOSINOPHIL # BLD AUTO: 0 10E3/UL (ref 0–0.7)
EOSINOPHIL NFR BLD AUTO: 0 %
ERYTHROCYTE [DISTWIDTH] IN BLOOD BY AUTOMATED COUNT: 12.7 % (ref 10–15)
GFR SERPL CREATININE-BSD FRML MDRD: >90 ML/MIN/1.73M2
GLUCOSE BLD-MCNC: 180 MG/DL (ref 70–99)
HCT VFR BLD AUTO: 35.6 % (ref 35–47)
HGB BLD-MCNC: 11.7 G/DL (ref 11.7–15.7)
IMM GRANULOCYTES # BLD: 0 10E3/UL
IMM GRANULOCYTES NFR BLD: 0 %
LYMPHOCYTES # BLD AUTO: 0.4 10E3/UL (ref 0.8–5.3)
LYMPHOCYTES NFR BLD AUTO: 4 %
MCH RBC QN AUTO: 29 PG (ref 26.5–33)
MCHC RBC AUTO-ENTMCNC: 32.9 G/DL (ref 31.5–36.5)
MCV RBC AUTO: 88 FL (ref 78–100)
MONOCYTES # BLD AUTO: 0.1 10E3/UL (ref 0–1.3)
MONOCYTES NFR BLD AUTO: 1 %
NEUTROPHILS # BLD AUTO: 9.1 10E3/UL (ref 1.6–8.3)
NEUTROPHILS NFR BLD AUTO: 95 %
NRBC # BLD AUTO: 0 10E3/UL
NRBC BLD AUTO-RTO: 0 /100
PLATELET # BLD AUTO: 267 10E3/UL (ref 150–450)
POTASSIUM BLD-SCNC: 4.4 MMOL/L (ref 3.4–5.3)
RBC # BLD AUTO: 4.04 10E6/UL (ref 3.8–5.2)
SODIUM SERPL-SCNC: 137 MMOL/L (ref 133–144)
SPECIMEN EXPIRATION DATE: ABNORMAL
SPECIMEN EXPIRATION DATE: NORMAL
WBC # BLD AUTO: 9.6 10E3/UL (ref 4–11)

## 2022-08-24 PROCEDURE — 82140 ASSAY OF AMMONIA: CPT | Performed by: STUDENT IN AN ORGANIZED HEALTH CARE EDUCATION/TRAINING PROGRAM

## 2022-08-24 PROCEDURE — 250N000011 HC RX IP 250 OP 636: Performed by: STUDENT IN AN ORGANIZED HEALTH CARE EDUCATION/TRAINING PROGRAM

## 2022-08-24 PROCEDURE — 120N000002 HC R&B MED SURG/OB UMMC

## 2022-08-24 PROCEDURE — 59025 FETAL NON-STRESS TEST: CPT | Mod: 26 | Performed by: OBSTETRICS & GYNECOLOGY

## 2022-08-24 PROCEDURE — 250N000013 HC RX MED GY IP 250 OP 250 PS 637: Performed by: STUDENT IN AN ORGANIZED HEALTH CARE EDUCATION/TRAINING PROGRAM

## 2022-08-24 PROCEDURE — 87086 URINE CULTURE/COLONY COUNT: CPT | Performed by: STUDENT IN AN ORGANIZED HEALTH CARE EDUCATION/TRAINING PROGRAM

## 2022-08-24 PROCEDURE — 86850 RBC ANTIBODY SCREEN: CPT | Performed by: STUDENT IN AN ORGANIZED HEALTH CARE EDUCATION/TRAINING PROGRAM

## 2022-08-24 PROCEDURE — 80048 BASIC METABOLIC PNL TOTAL CA: CPT | Performed by: STUDENT IN AN ORGANIZED HEALTH CARE EDUCATION/TRAINING PROGRAM

## 2022-08-24 PROCEDURE — 85025 COMPLETE CBC W/AUTO DIFF WBC: CPT | Performed by: STUDENT IN AN ORGANIZED HEALTH CARE EDUCATION/TRAINING PROGRAM

## 2022-08-24 PROCEDURE — 86870 RBC ANTIBODY IDENTIFICATION: CPT | Performed by: STUDENT IN AN ORGANIZED HEALTH CARE EDUCATION/TRAINING PROGRAM

## 2022-08-24 PROCEDURE — 36415 COLL VENOUS BLD VENIPUNCTURE: CPT | Performed by: STUDENT IN AN ORGANIZED HEALTH CARE EDUCATION/TRAINING PROGRAM

## 2022-08-24 PROCEDURE — 258N000001 HC RX 258: Performed by: STUDENT IN AN ORGANIZED HEALTH CARE EDUCATION/TRAINING PROGRAM

## 2022-08-24 PROCEDURE — 99222 1ST HOSP IP/OBS MODERATE 55: CPT | Mod: 25 | Performed by: OBSTETRICS & GYNECOLOGY

## 2022-08-24 PROCEDURE — 250N000009 HC RX 250: Performed by: STUDENT IN AN ORGANIZED HEALTH CARE EDUCATION/TRAINING PROGRAM

## 2022-08-24 RX ORDER — METOCLOPRAMIDE HYDROCHLORIDE 5 MG/ML
10 INJECTION INTRAMUSCULAR; INTRAVENOUS EVERY 6 HOURS PRN
Status: DISCONTINUED | OUTPATIENT
Start: 2022-08-24 | End: 2022-08-26 | Stop reason: HOSPADM

## 2022-08-24 RX ORDER — LIDOCAINE 40 MG/G
CREAM TOPICAL
Status: DISCONTINUED | OUTPATIENT
Start: 2022-08-24 | End: 2022-08-26 | Stop reason: HOSPADM

## 2022-08-24 RX ORDER — HYDROXYZINE HYDROCHLORIDE 25 MG/1
25-50 TABLET, FILM COATED ORAL EVERY 6 HOURS PRN
Status: DISCONTINUED | OUTPATIENT
Start: 2022-08-24 | End: 2022-08-26 | Stop reason: HOSPADM

## 2022-08-24 RX ORDER — ACETAMINOPHEN 325 MG/1
650 TABLET ORAL EVERY 4 HOURS PRN
Status: DISCONTINUED | OUTPATIENT
Start: 2022-08-24 | End: 2022-08-26 | Stop reason: HOSPADM

## 2022-08-24 RX ORDER — MAGNESIUM SULFATE IN WATER 40 MG/ML
2 INJECTION, SOLUTION INTRAVENOUS CONTINUOUS
Status: DISCONTINUED | OUTPATIENT
Start: 2022-08-24 | End: 2022-08-25

## 2022-08-24 RX ORDER — PENICILLIN G 3000000 [IU]/50ML
3 INJECTION, SOLUTION INTRAVENOUS EVERY 4 HOURS
Status: DISCONTINUED | OUTPATIENT
Start: 2022-08-25 | End: 2022-08-25

## 2022-08-24 RX ORDER — CALCIUM GLUCONATE 94 MG/ML
1 INJECTION, SOLUTION INTRAVENOUS
Status: DISCONTINUED | OUTPATIENT
Start: 2022-08-24 | End: 2022-08-26 | Stop reason: HOSPADM

## 2022-08-24 RX ORDER — INDOMETHACIN 25 MG/1
25 CAPSULE ORAL EVERY 6 HOURS
Status: DISCONTINUED | OUTPATIENT
Start: 2022-08-25 | End: 2022-08-26

## 2022-08-24 RX ORDER — ONDANSETRON 2 MG/ML
4 INJECTION INTRAMUSCULAR; INTRAVENOUS EVERY 6 HOURS PRN
Status: DISCONTINUED | OUTPATIENT
Start: 2022-08-24 | End: 2022-08-26 | Stop reason: HOSPADM

## 2022-08-24 RX ORDER — ONDANSETRON 4 MG/1
4 TABLET, ORALLY DISINTEGRATING ORAL EVERY 6 HOURS PRN
Status: DISCONTINUED | OUTPATIENT
Start: 2022-08-24 | End: 2022-08-26 | Stop reason: HOSPADM

## 2022-08-24 RX ORDER — PENICILLIN G POTASSIUM 5000000 [IU]/1
5 INJECTION, POWDER, FOR SOLUTION INTRAMUSCULAR; INTRAVENOUS ONCE
Status: COMPLETED | OUTPATIENT
Start: 2022-08-24 | End: 2022-08-24

## 2022-08-24 RX ORDER — PROCHLORPERAZINE 25 MG
25 SUPPOSITORY, RECTAL RECTAL EVERY 12 HOURS PRN
Status: DISCONTINUED | OUTPATIENT
Start: 2022-08-24 | End: 2022-08-26 | Stop reason: HOSPADM

## 2022-08-24 RX ORDER — INDOMETHACIN 50 MG/1
50 CAPSULE ORAL ONCE
Status: COMPLETED | OUTPATIENT
Start: 2022-08-24 | End: 2022-08-24

## 2022-08-24 RX ORDER — METOCLOPRAMIDE 10 MG/1
10 TABLET ORAL EVERY 6 HOURS PRN
Status: DISCONTINUED | OUTPATIENT
Start: 2022-08-24 | End: 2022-08-26 | Stop reason: HOSPADM

## 2022-08-24 RX ORDER — PROCHLORPERAZINE MALEATE 10 MG
10 TABLET ORAL EVERY 6 HOURS PRN
Status: DISCONTINUED | OUTPATIENT
Start: 2022-08-24 | End: 2022-08-26 | Stop reason: HOSPADM

## 2022-08-24 RX ORDER — BETAMETHASONE SODIUM PHOSPHATE AND BETAMETHASONE ACETATE 3; 3 MG/ML; MG/ML
12 INJECTION, SUSPENSION INTRA-ARTICULAR; INTRALESIONAL; INTRAMUSCULAR; SOFT TISSUE ONCE
Status: COMPLETED | OUTPATIENT
Start: 2022-08-25 | End: 2022-08-25

## 2022-08-24 RX ADMIN — PENICILLIN G POTASSIUM 5 MILLION UNITS: 5000000 POWDER, FOR SOLUTION INTRAMUSCULAR; INTRAPLEURAL; INTRATHECAL; INTRAVENOUS at 21:07

## 2022-08-24 RX ADMIN — MAGNESIUM SULFATE HEPTAHYDRATE 2 G/HR: 40 INJECTION, SOLUTION INTRAVENOUS at 21:08

## 2022-08-24 RX ADMIN — HYDROXYZINE HYDROCHLORIDE 25 MG: 25 TABLET, FILM COATED ORAL at 23:37

## 2022-08-24 RX ADMIN — INDOMETHACIN 50 MG: 50 CAPSULE ORAL at 23:38

## 2022-08-24 RX ADMIN — SODIUM CHLORIDE: 234 INJECTION INTRAMUSCULAR; INTRAVENOUS; SUBCUTANEOUS at 21:46

## 2022-08-24 ASSESSMENT — ACTIVITIES OF DAILY LIVING (ADL)
DIFFICULTY_EATING/SWALLOWING: NO
WEAR_GLASSES_OR_BLIND: NO
CHANGE_IN_FUNCTIONAL_STATUS_SINCE_ONSET_OF_CURRENT_ILLNESS/INJURY: NO
ADLS_ACUITY_SCORE: 18
TOILETING_ISSUES: NO
CONCENTRATING,_REMEMBERING_OR_MAKING_DECISIONS_DIFFICULTY: NO
FALL_HISTORY_WITHIN_LAST_SIX_MONTHS: NO
WALKING_OR_CLIMBING_STAIRS_DIFFICULTY: NO
DOING_ERRANDS_INDEPENDENTLY_DIFFICULTY: NO
DRESSING/BATHING_DIFFICULTY: NO
ADLS_ACUITY_SCORE: 18

## 2022-08-25 LAB
AMMONIA PLAS-SCNC: 14 UMOL/L (ref 10–50)
AMMONIA PLAS-SCNC: 15 UMOL/L (ref 10–50)

## 2022-08-25 PROCEDURE — 59025 FETAL NON-STRESS TEST: CPT | Mod: 26 | Performed by: OBSTETRICS & GYNECOLOGY

## 2022-08-25 PROCEDURE — 120N000002 HC R&B MED SURG/OB UMMC

## 2022-08-25 PROCEDURE — 250N000011 HC RX IP 250 OP 636: Performed by: STUDENT IN AN ORGANIZED HEALTH CARE EDUCATION/TRAINING PROGRAM

## 2022-08-25 PROCEDURE — 36415 COLL VENOUS BLD VENIPUNCTURE: CPT | Performed by: STUDENT IN AN ORGANIZED HEALTH CARE EDUCATION/TRAINING PROGRAM

## 2022-08-25 PROCEDURE — 250N000013 HC RX MED GY IP 250 OP 250 PS 637: Performed by: STUDENT IN AN ORGANIZED HEALTH CARE EDUCATION/TRAINING PROGRAM

## 2022-08-25 PROCEDURE — 99231 SBSQ HOSP IP/OBS SF/LOW 25: CPT | Mod: 25 | Performed by: OBSTETRICS & GYNECOLOGY

## 2022-08-25 PROCEDURE — 82140 ASSAY OF AMMONIA: CPT | Performed by: STUDENT IN AN ORGANIZED HEALTH CARE EDUCATION/TRAINING PROGRAM

## 2022-08-25 RX ORDER — CALCIUM CARBONATE 500 MG/1
500-1000 TABLET, CHEWABLE ORAL 3 TIMES DAILY PRN
Status: DISCONTINUED | OUTPATIENT
Start: 2022-08-25 | End: 2022-08-26 | Stop reason: HOSPADM

## 2022-08-25 RX ORDER — FAMOTIDINE 10 MG
10 TABLET ORAL 2 TIMES DAILY
Status: DISCONTINUED | OUTPATIENT
Start: 2022-08-25 | End: 2022-08-26 | Stop reason: HOSPADM

## 2022-08-25 RX ADMIN — HYDROXYZINE HYDROCHLORIDE 25 MG: 25 TABLET, FILM COATED ORAL at 23:56

## 2022-08-25 RX ADMIN — INDOMETHACIN 25 MG: 25 CAPSULE ORAL at 05:37

## 2022-08-25 RX ADMIN — INDOMETHACIN 25 MG: 25 CAPSULE ORAL at 11:39

## 2022-08-25 RX ADMIN — SERTRALINE HYDROCHLORIDE 50 MG: 50 TABLET, FILM COATED ORAL at 08:01

## 2022-08-25 RX ADMIN — PENICILLIN G 3 MILLION UNITS: 3000000 INJECTION, SOLUTION INTRAVENOUS at 01:10

## 2022-08-25 RX ADMIN — CALCIUM CARBONATE (ANTACID) CHEW TAB 500 MG 1000 MG: 500 CHEW TAB at 20:41

## 2022-08-25 RX ADMIN — INDOMETHACIN 25 MG: 25 CAPSULE ORAL at 17:37

## 2022-08-25 RX ADMIN — FAMOTIDINE 10 MG: 10 TABLET ORAL at 20:40

## 2022-08-25 RX ADMIN — INDOMETHACIN 25 MG: 25 CAPSULE ORAL at 23:55

## 2022-08-25 RX ADMIN — BETAMETHASONE SODIUM PHOSPHATE AND BETAMETHASONE ACETATE 12 MG: 3; 3 INJECTION, SUSPENSION INTRA-ARTICULAR; INTRALESIONAL; INTRAMUSCULAR; SOFT TISSUE at 14:34

## 2022-08-25 ASSESSMENT — ACTIVITIES OF DAILY LIVING (ADL)
ADLS_ACUITY_SCORE: 18

## 2022-08-25 NOTE — PLAN OF CARE
Goal Outcome Evaluation:       labor; patient states contractions are getting more frequent and painful. Magnesium infusing. Penicillin 1st dose given. Dr. Patterson and Dr. Prakash at bedside to assess patient.

## 2022-08-25 NOTE — H&P
Antepartum History and Physical   2022  Myrtle Strickland  [id number]      HPI: Myrtle Strickland is a 27 year old  at 30w4d by LMP c/w 6w4d US who is transferred here from [facility] for rule out  labor.    She reports that she has been having some contractions throughout her pregnancy.  In clinic today on cervical exam she was 2 to 3 cm and so was sent to the hospital.  Had [facility] her cervical exam remained unchanged at 2-350/-1.  Due to her metabolic disorder, she was transferred here.  She reports that her contractions were about every 7 minutes apart and now they are about every 5 minutes apart.  Denies LO F, vaginal bleeding.  Feeling baby moving. She denies headache, vision changes, chest pain, shortness of breath, fever, chills, nausea, vomiting or other systemic complaints.    ROS: No headaches, vision changes, nausea, vomiting, fevers, chills, chest pain, SOB, abdominal pain, constipation, diarrhea, dysuria, changes in vaginal discharge or edema in extremities noted.     Her pregnancy has been complicated by:  - history of  delivery at 34w4d  - family history of OTC deficiency  - anxiety, depression, ADHD, bipolar, and asperger's  - psychogenic seizures  - hx eating disorder  - rubella NI    OBHX:   OB History    Para Term  AB Living   4 1 0 1 2 1   SAB IAB Ectopic Multiple Live Births   2 0 0 0 1      # Outcome Date GA Lbr Karl/2nd Weight Sex Delivery Anes PTL Lv   4 Current            3  19 34w5d  2.34 kg (5 lb 2.5 oz) F Vag-Spont   CLARISSA      Name: Papo      Apgar1: 8  Apgar5: 9   2 SAB 13 6w0d          1 2010 4w0d              MedicalHX:   Past Medical History:   Diagnosis Date     ADHD (attention deficit hyperactivity disorder)      Anxiety      Depressive disorder      Neurological disorder      Uncomplicated asthma        SurgicalHX:   Past Surgical History:   Procedure Laterality Date     AS REMOVAL OF TONSILS,<12  "Y/O         Medications:   No current facility-administered medications on file prior to encounter.  metoclopramide (REGLAN) 10 MG tablet,   ondansetron (ZOFRAN) 4 MG tablet, ondansetron HCl 4 mg tablet   Take 1 tablet by mouth every 6-8 hours as directed.  Prenatal MV & Min w/FA-DHA (PRENATAL ADULT GUMMY/DHA/FA PO), Prenatal Gummies  promethazine (PHENERGAN) 25 MG tablet, promethazine 25 mg tablet   Take 1 tablet by mouth every 6-8 hours by oral route.  sertraline (ZOLOFT) 50 MG tablet, sertraline 50 mg tablet   Take 1 tablet every day by oral route.        Allergies:  Allergies   Allergen Reactions     Guaifenesin Hives and Nausea and Vomiting     Adhesive Tape Other (See Comments)     Burns only with waterproof bandages. [facility] hospital Tegaderm and tape is okay.  Burns only with waterproof bandages. [facility] hospital Tegaderm and tape is okay.  Burns only with waterproof bandages. [facility] hospital Tegaderm and tape is okay.  Burns only with waterproof bandages. [facility] hospital Tegaderm and tape is okay.       Mushroom      Latex Hives, Itching and Rash       FamilyHX:  History reviewed. No pertinent family history.    SocialHX:   Social History     Socioeconomic History     Marital status: Single     Spouse name: None     Number of children: None     Years of education: None     Highest education level: None   Tobacco Use     Smoking status: Passive Smoke Exposure - Never Smoker     Smokeless tobacco: Former User   Substance and Sexual Activity     Alcohol use: Not Currently     Drug use: Not Currently     Sexual activity: Yes     Partners: Male       ROS: 10-point ROS negative except as indicated in HPI.    Physical Exam:  Vitals:    08/24/22 1900 08/24/22 2000 08/24/22 2201   BP: 114/67  110/58   BP Location: Left arm  Left arm   Pulse: 78     Resp: 19     Temp: 98.1  F (36.7  C)     TempSrc: Oral     SpO2:  99% 100%   Height: 1.651 m (5' 5\")       General: alert, oriented female, resting in bed in NAD  CV: " regular rate and rhythm, normal s1 and s2, no murmurs  Lungs: clear bilaterally, no crackles or wheezes  Abdomen: soft, gravid, non-tender  Extremities: bilateral lower extremities non-tender with no edema    SVE: 2-3/40/-2  Presentation: breech by BSUS    FHT: baseline 130, moderate variability, + accelerations, no decelerations  [place]: 1-3 contractions in 10 minutes    Prenatal Labs:      Lab Results   Component Value Date    HGB 11.7 08/24/2022       GBS Status:   No results found for: GBS   Collected at [facility]    No results found for: PAP    Labs:   Results for orders placed or performed during the hospital encounter of 08/24/22 (from the past 24 hour(s))   CBC with platelets differential    Narrative    The following orders were created for panel order CBC with platelets differential.  Procedure                               Abnormality         Status                     ---------                               -----------         ------                     CBC with platelets and d...[[id number]]  Abnormal            Final result                 Please view results for these tests on the individual orders.   ABO/Rh type and screen    Narrative    The following orders were created for panel order ABO/Rh type and screen.  Procedure                               Abnormality         Status                     ---------                               -----------         ------                     Adult Type and Screen[[id number]]                            In process                   Please view results for these tests on the individual orders.   Ammonia   Result Value Ref Range    Ammonia 11 10 - 50 umol/L   Basic metabolic panel   Result Value Ref Range    Sodium 137 133 - 144 mmol/L    Potassium 4.4 3.4 - 5.3 mmol/L    Chloride 108 94 - 109 mmol/L    Carbon Dioxide (CO2) 22 20 - 32 mmol/L    Anion Gap 7 3 - 14 mmol/L    Urea Nitrogen 5 (L) 7 - 30 mg/dL    Creatinine 0.64 0.52 - 1.04 mg/dL    Calcium 7.4  (L) 8.5 - 10.1 mg/dL    Glucose 180 (H) 70 - 99 mg/dL    GFR Estimate >90 >60 mL/min/1.73m2   CBC with platelets and differential   Result Value Ref Range    WBC Count 9.6 4.0 - 11.0 10e3/uL    RBC Count 4.04 3.80 - 5.20 10e6/uL    Hemoglobin 11.7 11.7 - 15.7 g/dL    Hematocrit 35.6 35.0 - 47.0 %    MCV 88 78 - 100 fL    MCH 29.0 26.5 - 33.0 pg    MCHC 32.9 31.5 - 36.5 g/dL    RDW 12.7 10.0 - 15.0 %    Platelet Count 267 150 - 450 10e3/uL    % Neutrophils 95 %    % Lymphocytes 4 %    % Monocytes 1 %    % Eosinophils 0 %    % Basophils 0 %    % Immature Granulocytes 0 %    NRBCs per 100 WBC 0 <1 /100    Absolute Neutrophils 9.1 (H) 1.6 - 8.3 10e3/uL    Absolute Lymphocytes 0.4 (L) 0.8 - 5.3 10e3/uL    Absolute Monocytes 0.1 0.0 - 1.3 10e3/uL    Absolute Eosinophils 0.0 0.0 - 0.7 10e3/uL    Absolute Basophils 0.0 0.0 - 0.2 10e3/uL    Absolute Immature Granulocytes 0.0 <=0.4 10e3/uL    Absolute NRBCs 0.0 10e3/uL       Assessment/Plan: 27 year old  at 30w4d by LMP c/w 6w4d US, transferred here from [facility] for rule out  labor.    # r/o PTL  She has been feeling painful contractions today.  Cervix has been unchanged on serial exams.  - Admit to [facility] service  - continue BMZ course  -Continue magnesium for 12 hours, will likely stop at 12 hours if patient is feeling well.  -Continue penicillin for GBS unknown until 12 hours, will likely stop if patient feeling well.  GBS collected at [facility].  -Tylenol, Vistaril, heat as needed for pain  -Breech presentation.  Discussed that if she were to make significant cervical change then would require delivery via  section.  Briefly reviewed the risks including bleeding, infection, injury to other structures, risk of a classical incision.  She would consent to a blood transfusion.    #Family history of OTC deficiency  Patient has had negative molecular and genetic testing females can still exhibit signs and symptoms of OTC deficiency  even with negative testing.  Males typically have worse presentation, she does have a male baby.  Dr. Patterson discussed with metabolic team.   -Avoid catabolic state  -Ammonia level every 8 hours  -Baseline BMP  -Fluids D10 NS at 100/h  -Regular diet    Anxiety  Depression  Bipolar  Psychogenic pseudoseizures  Continue PTA Zoloft  -As needed Vistaril  -Supportive cares if patient has a pseudoseizure, no need for antiepileptics    Fetal well-being  - Category 1 FHT  - continuous fetal monitoring    Patient seen and care plan discussed under supervision of Dr. Patterson.    Kali Prakash MD  OB/GYN PGY-3  2022 10:07 PM    Physician Attestation   I, Mariana Patterson DO, DO, saw and evaluated Myrtle Strickland with the resident.  I have reviewed and discussed with Dr. Prakash their history, physical and plan.    I personally reviewed the vital signs, medications, labs and imaging.    My key history or physical exam findings/Management:  Patient is a 26 y/o  @ 30 4/7 weeks transferred from [facility] due to fam hx of OTC deficiency.  She met with GC and Metabolic Genetics clinic.  Myrtle did not exhibit biochemical or OTC gene abnormality, but the urea cycle condition can still be present in 10-15% of female patients.  Labor and delivery can lead to a catabolic state and metabolic crisis if not managed.  See above for discussion regarding patient's PTL.      Continue BMZ course, Magnesium sulfate and will add indocin if ctx continue and are regular.  To prevent catabolic state patient is started on D10 NS at 100cc/hr.  She can have a regular diet.     Hx of psychogenic seizures and anxiety.     NST: reactive  [place]: ctx q 2-4 min    Mariana Patterson DO  Date of Service (when I saw the patient): 22    Time Spent on this Encounter   I, Mariana Patterson DO, DO, spent a total of 50 minutes face to face or coordinating care of Myrtle Strickland.  Over 50% of my time on the unit was spent counseling  the patient and/or coordinating care regarding possible OTC deficiency,  labor.

## 2022-08-25 NOTE — PROGRESS NOTES
"Antepartum Progress Note    Subjective:   Myrtle is feeling well this morning, much better than when she came in overnight. Her contractions have decreased in intensity and frequency. States that she is still having some discomfort, but that is \"normal\" for her when she gets to this gestational age in pregnancy, and it is more \"bothersome\" than painful. She is tolerating a regular diet. Denies LOF or VB, and endorses normal fetal movement.    Objective:  Vitals:    22 0116 22 0515 22 0517 22 0756   BP: 111/69  104/66 114/69   BP Location:    Right arm   Patient Position:    Semi-Estrada's   Cuff Size:    Adult Regular   Pulse:       Resp: 20   17   Temp: 97.7  F (36.5  C) 97.6  F (36.4  C)  98.2  F (36.8  C)   TempSrc: Oral Oral  Oral   SpO2:       Weight:  72.4 kg (159 lb 11.2 oz)     Height:           I/O last 3 completed shifts:  In: 2576 [P.O.:2576]  Out: 1750 [Urine:1750]    Gen: Resting comfortably in bed, NAD  CV: Well-perfused  Resp: Breathing comfortably on room air  Abd: Gravid, non-tender, non-distended    FHT: Baseline 130, mod variability, + accels, no decels  [place]: No ctx    Assessment/Plan:   Myrtle Strickland is a 27 year old  @ 30w5d, admitted for rule-out  labor. Pregnancy complicated by h/o PTD at 34w4d, FHx of OTC deficiency, anxiety, depression, ADHD, bipolar, and Asperger's, psychogenic seizures, h/o seizure disorder, and rubella NI.    # contractions, r/o PTL  - BMZ #1 ( 9269), next due this afternoon  - Indocin q6h through beta window  - s/p 10h Mg for fetal neuroprotection, discontinue d/t patient discomfort  - GBS pending, s/p 10h PCN until pt's cervix was found to be stable at 2 cm  - Tylenol, Vistaril, heat as needed for pain  - Breech presentation, would require CS for delivery     #Family history of OTC deficiency  Patient has had negative molecular and genetic testing females can still exhibit signs and symptoms of OTC deficiency even with " negative testing.  Males typically have worse presentation, she does have a male baby.  Dr. Patterson discussed with metabolic team.   -Avoid catabolic state  -Ammonia level every 8 hours  -Baseline BMP  -Initially started on D10 NS at 100 ml/hr, but discontinued when patient was not found to be in  labor  -Regular diet     Anxiety  Depression  Bipolar  Psychogenic pseudoseizures  Continue PTA Zoloft  -As needed Vistaril  -Supportive cares if patient has a pseudoseizure, no need for antiepileptics     Fetal well-being  - Category 1 FHT  - Continuous fetal monitoring    Dispo: Continuous monitoring, with BMZ due at 1500. Continue indocin through BMZ window. Possible discharge  or  if cervix remains unchanged.    Seen and discussed with Dr. Patterson.    Devi Hair MD  OB/GYN, PGY-3  2022, 11:30 AM     Physician Attestation   Mariana GIBSON DO, saw and evaluated Myrtle Strickland with the resident.      I personally reviewed the vital signs, medications, labs and imaging.    My key history or physical exam findings/Key management.  Patient is feeling much better.  Not having painful contractions just rare tightening.  She stated that frequent contractions were common with her prior pregnancy as well.  Tolerating a regular diet.  2nd dose of BMZ will be this afternoon.  Continue indocin.  Reassess tomorrow for potential discharge if stable.     S/p BMZ x 12 hrs  S/p PCN    Anticipate discharge if no cervical change after BMZ and indocin course.  Stopped D10 NS as patient is no longer concerning for  labor and tolerating a regular diet.  Ammonia levels have been normal.     NST: reactive  [place]: irregular contractions, irritability    Mariana Patterson DO  Date of Service (when I saw the patient): 22    Time Spent on this Encounter   IMariana DO, spent a total of 15 minutes face to face or coordinating care of Myrtle Strickland.  Over 50% of my time on the unit was  spent counseling the patient and/or coordinating care regarding potential OTC deficiency, here for rule out labor.     Mariana Patterson DO FACOG  Maternal Fetal Medicine Specialist  Pager: [phone]  Mobile: [phone]

## 2022-08-25 NOTE — DISCHARGE SUMMARY
[facility] Discharge Summary    Myrtle Strickland MRN# [id number]   Age: 27 year old YOB: 1995     Date of Admission:  2022  Date of Discharge::  2022  Admitting Physician:  Mariana Patterson DO  Discharge Physician:  Regina Carmona MD, PhD          Admission Diagnoses:     IUP at 30w5d   contractions  history of  delivery at 34w4d  family history of OTC deficiency  anxiety, depression, ADHD, bipolar, and asperger's  psychogenic seizures  hx eating disorder  rubella NI          Discharge Diagnosis:   Same         Procedures:   BMZ course  Fetal and uterine monitoring         Medications Prior to Admission:     Medications Prior to Admission   Medication Sig Dispense Refill Last Dose     metoclopramide (REGLAN) 10 MG tablet    Past Month at Unknown time     ondansetron (ZOFRAN) 4 MG tablet ondansetron HCl 4 mg tablet   Take 1 tablet by mouth every 6-8 hours as directed.   Past Month at Unknown time     Prenatal MV & Min w/FA-DHA (PRENATAL ADULT GUMMY/DHA/FA PO) Prenatal Gummies   2022 at Unknown time     promethazine (PHENERGAN) 25 MG tablet promethazine 25 mg tablet   Take 1 tablet by mouth every 6-8 hours by oral route.   Past Month at Unknown time     sertraline (ZOLOFT) 50 MG tablet sertraline 50 mg tablet   Take 1 tablet every day by oral route.   2022 at Unknown time              Discharge Medications:        Review of your medicines      CONTINUE these medicines which have NOT CHANGED      Dose / Directions   metoclopramide 10 MG tablet  Commonly known as: REGLAN      Refills: 0     ondansetron 4 MG tablet  Commonly known as: ZOFRAN      ondansetron HCl 4 mg tablet   Take 1 tablet by mouth every 6-8 hours as directed.  Refills: 0     PRENATAL ADULT GUMMY/DHA/FA PO      Prenatal Gummies  Refills: 0     promethazine 25 MG tablet  Commonly known as: PHENERGAN      promethazine 25 mg tablet   Take 1 tablet by mouth every 6-8 hours by oral  route.  Refills: 0     sertraline 50 MG tablet  Commonly known as: ZOLOFT      sertraline 50 mg tablet   Take 1 tablet every day by oral route.  Refills: 0               Brief History of Admission:   Myrtle Strickland is a 27 year old  at 30w6d by LMP c/w 6w4d US who is transferred here from [facility] for rule out  labor.     She reports that she has been having some contractions throughout her pregnancy.  In clinic, on cervical exam she was 2 to 3 cm and so was sent to the hospital.  At [facility] her cervical exam remained unchanged at 2-3/50/-1.  Due to her metabolic disorder, she was transferred here.  She reports that her contractions were about every 7 minutes apart and now they are about every 5 minutes apart.  Denies LO F, vaginal bleeding.  Feeling baby moving.         Antepartum Course:   She received the first of her BMZ course at [facility].  She received her second dose here.  She was continued on magnesium for approximately 10 hours, stopped early due to discomfort with IV infusion.  She was started on a course of Indocin.  Her cervix was unchanged over serial exams at [facility], unchanged on exam on admission here.    For her metabolic disorder, her ammonia and BMP were normal.  She was continued on D10 NS until her cervix was unchanged. She was maintained on a regular diet.    Her contractions resolved on HD#2. Her indocin was discontinued after her 2nd dose of betamethasone. She was discharged home with follow up outpatient in the [facility] office on  at 0800.          Discharge Instructions and Follow-Up:     Discharge diet: Regular   Discharge activity: Activity as tolerated   Discharge follow-up: [facility] office  at 0800           Discharge Disposition:   Discharged to home      Clover Carmona MD PhD  Department of Obstetrics, Gynecology and Women's Health  Maternal Fetal Medicine Division    Physician Attestation   I saw and  evaluated this patient prior to discharge.  I discussed the patient with the resident/fellow and agree with plan of care as documented in the note.        Regina Carmona MD  Date of Service (when I saw the patient): 08/26/22

## 2022-08-25 NOTE — PLAN OF CARE
Goal Outcome Evaluation:    Plan of Care Reviewed With: patient, significant other     Overall Patient Progress: improving    Outcome Evaluation: PT sleeping in between cares and contractions are spacing out    VSS on room air. PT experienced pain in hand and arm with magnesium infusion despite IV patency and skin appearing WDL. MD and anesthesia notified. Heat packs applied. Anesthesia replaced IV and PT still reported pain with magnesium infusion despite new IV. Per MD okay to stop magnesium and penicillin infusions as PT reported contractions became less intense after Atarax and Indocin. See flow sheets for uterine and fetal monitoring. This morning patient reports only mild cramping/contractions.

## 2022-08-25 NOTE — PLAN OF CARE
"Antepartum Transfer from another facility admit note  Myrtle Strickland      MRN:  [id number]    Gestational Age: 30w4d   Transferring Facility:[facility]      Myrtle Strickland arrived on 2022 at 9:22 PM via Ambulance from [facility] for evaluation and treatment of  labor with metabolic disorder risk.  Accompanied by Randy Urrutia.  Prenatal records and transferring facility records sent with patient.  /67 (BP Location: Left arm)   Pulse 78   Temp 98.1  F (36.7  C) (Oral)   Resp 19   Ht 1.651 m (5' 5\")   LMP 2022   SpO2 99%   Breastfeeding Unknown   BMI 24.76 kg/m  .      FHT: 120  NST: Accelerations: Present            Decelerations:None            NST: Appropriate for Gestational Age .  Uterine Assessment: frequency q 2-7 minutes, Contractions: Duration 50-80 seconds of moderate quality.  Bleeding: absent.  Vaginal Discharge: reports none.    IV: Infusion:100mL/Hr with magnesium maintence from transferring facility.  Medications: 1st Betamethasone, Magnesium, Ampicillin given at referring facility.  Labs: GBS, FFN, Covid, done at referring facility.    Dr. Patterson and Dr. Prakash, Resident notified of arrival & condition.  Oriented patient to surroundings and unit. Call light within reach.     "

## 2022-08-25 NOTE — PROVIDER NOTIFICATION
08/25/22 1125   Provider Notification   Provider Name/Title Laxmi Patterson   Method of Notification At Bedside   Request Evaluate - Remote;Evaluate in Person   Notification Reason Status Update   Providers present to evaluate in person. Plan per provider to proceed with indocin and beta administration. Patient likely to discharge Saturday if stable condition continues. Will proceed with ongoing assessment.

## 2022-08-25 NOTE — PROGRESS NOTES
"Brief [facility] Progress Note    S: Notified by RN that patient is having arm pain.  It started on her right arm where her IV mag was infusing.  It is intermittent and very painful.  She had a new IV placed in her left arm which now has IV mag infusing.  The pain in her right arm has stopped, but now is in her left arm.  She describes it as a \"burning contraction\".  She normally is \"hypersensitive\" to IV fluids and normally has some tingling.  No other pain, no other neurologic symptoms.  Her contractions are not bothering her as much right now, she is focusing on her arm mainly.  She thinks the contractions have decreased in intensity and frequency.      O:  Vitals:    22 1900 22 2201 22 0116   BP: 114/67  110/58 111/69   BP Location: Left arm  Left arm    Pulse: 78      Resp: 19   20   Temp: 98.1  F (36.7  C)   97.7  F (36.5  C)   TempSrc: Oral   Oral   SpO2:  99% 100%    Height: 1.651 m (5' 5\")        Gen: alert, NAD   neuro: Alert, oriented x3, strength 5 out of 5 bilateral upper and lower extremities sensation grossly intact bilateral upper and lower extremities  EXT: No erythema, redness of either right or left extremity    A/P: Myrtle Strickland is a 27 year old  at 30w5d who is HD #2 for R/o PTL.  Now with arm pain related to her IV magnesium.  Her pain was first in her right arm, resolved with stopping mag in that arm.  Pain now her left arm where her magnesium is.  At this time her contractions have decreased in intensity and frequency.  Her cervix has not changed on serial exams yesterday.  Plan was to stop magnesium at 12 hours if she was doing well.  No other neurologic signs or symptoms.  Due to her discomfort, will stop now at approximately 10 hours.  We will continue to monitor her arm pain closely.    Kali Prakash MD  OB/GYN PGY-2  2022 1:39 AM    "

## 2022-08-25 NOTE — PLAN OF CARE
Goal Outcome Evaluation:        Patient VSS and afebrile. Patient denies any new or worsening s/s of PTL, new pain or change in condition. 's category 1, occasional ctx and irritability noted on monitor. Plan to transfer to [facility] at 1530. 2nd dose of beta received. Patient notified when to call out to RN, verbalized understanding and agreed to plan Will proceed with ongoing assessment.

## 2022-08-25 NOTE — PROVIDER NOTIFICATION
08/25/22 1520   Provider Notification   Provider Name/Title Patterson   Method of Notification In Department   Request Evaluate - Remote   Notification Reason Status Update   Provider notified of patient denial of any new/ worsening s/s of PTL, pain or change in condition. OK to monitor EFM TID, q4hrs VS, provider to update orders.

## 2022-08-26 VITALS
BODY MASS INDEX: 26.77 KG/M2 | DIASTOLIC BLOOD PRESSURE: 52 MMHG | HEIGHT: 65 IN | OXYGEN SATURATION: 100 % | WEIGHT: 160.7 LBS | HEART RATE: 78 BPM | SYSTOLIC BLOOD PRESSURE: 113 MMHG | TEMPERATURE: 98.2 F | RESPIRATION RATE: 16 BRPM

## 2022-08-26 DIAGNOSIS — G40.909 SEIZURE DISORDER (H): Primary | ICD-10-CM

## 2022-08-26 DIAGNOSIS — O47.00 PRETERM CONTRACTIONS: ICD-10-CM

## 2022-08-26 DIAGNOSIS — F90.9 ATTENTION DEFICIT HYPERACTIVITY DISORDER (ADHD), UNSPECIFIED ADHD TYPE: ICD-10-CM

## 2022-08-26 DIAGNOSIS — O09.893 HISTORY OF PRETERM DELIVERY, CURRENTLY PREGNANT IN THIRD TRIMESTER: ICD-10-CM

## 2022-08-26 LAB
AMMONIA PLAS-SCNC: 16 UMOL/L (ref 10–50)
BACTERIA UR CULT: NO GROWTH
HOLD SPECIMEN: NORMAL

## 2022-08-26 PROCEDURE — 99238 HOSP IP/OBS DSCHRG MGMT 30/<: CPT | Performed by: OBSTETRICS & GYNECOLOGY

## 2022-08-26 PROCEDURE — 36415 COLL VENOUS BLD VENIPUNCTURE: CPT | Performed by: STUDENT IN AN ORGANIZED HEALTH CARE EDUCATION/TRAINING PROGRAM

## 2022-08-26 PROCEDURE — 250N000013 HC RX MED GY IP 250 OP 250 PS 637: Performed by: STUDENT IN AN ORGANIZED HEALTH CARE EDUCATION/TRAINING PROGRAM

## 2022-08-26 PROCEDURE — 82140 ASSAY OF AMMONIA: CPT | Performed by: STUDENT IN AN ORGANIZED HEALTH CARE EDUCATION/TRAINING PROGRAM

## 2022-08-26 RX ORDER — LIDOCAINE 40 MG/G
CREAM TOPICAL 2 TIMES DAILY PRN
Status: DISCONTINUED | OUTPATIENT
Start: 2022-08-26 | End: 2022-08-26 | Stop reason: HOSPADM

## 2022-08-26 RX ORDER — DIAPER,BRIEF,INFANT-TODD,DISP
EACH MISCELLANEOUS 2 TIMES DAILY
Status: DISCONTINUED | OUTPATIENT
Start: 2022-08-26 | End: 2022-08-26 | Stop reason: HOSPADM

## 2022-08-26 RX ORDER — NEOMYCIN/BACITRACIN/POLYMYXINB 3.5-400-5K
OINTMENT (GRAM) TOPICAL 2 TIMES DAILY PRN
Status: DISCONTINUED | OUTPATIENT
Start: 2022-08-26 | End: 2022-08-26 | Stop reason: HOSPADM

## 2022-08-26 RX ADMIN — INDOMETHACIN 25 MG: 25 CAPSULE ORAL at 05:06

## 2022-08-26 ASSESSMENT — ACTIVITIES OF DAILY LIVING (ADL)
ADLS_ACUITY_SCORE: 18

## 2022-08-26 NOTE — PLAN OF CARE
Goal Outcome Evaluation:    VSS. Denies bleed, leaking of fluid, and pain. Pt had 7 contractions in an hour with this mornings monitoring. Dr. Prakash notified. Pt states she did not feel the contractions. Encouraged pt to empty bladder regularly and drink water.

## 2022-08-26 NOTE — CONSULTS
This writer acknowledges SW Consult.  Attempted to meet with Myrtle who was difficult to arouse.  She woke briefly and stated she was tired and did NOT want to speak to anyone.  Myrtle has MA through [employer] and is eligible for medical rides for appointments.  She can call [phone] the day before appointments to schedule rides.    Janeth BANKSW, MSW, [facility]  Maternal Child Health

## 2022-08-26 NOTE — PROGRESS NOTES
Antepartum Progress Note    Subjective:   Myrtle is feeling well this morning, much better than when she came in. She has occasional tightening, but it is not painful and has been happening for many weeks. No leaking of fluid or bleeding. Feeling normal fetal movement.    Objective:  Vitals:    22 2359 22 0505 22 0511   BP: 126/63 106/59 113/52    BP Location: Right arm Right arm     Patient Position: Semi-Estrada's Semi-Estrada's     Cuff Size: Adult Regular Adult Regular     Pulse:       Resp: 16 16     Temp: 98.3  F (36.8  C) 97.9  F (36.6  C) 98.2  F (36.8  C)    TempSrc: Oral Oral Oral    SpO2:       Weight:    72.9 kg (160 lb 11.2 oz)   Height:           I/O last 3 completed shifts:  In: 951.66 [I.V.:951.66]  Out: -     Gen: Resting comfortably in bed, NAD  CV: Well-perfused  Resp: Breathing comfortably on room air  Abd: Gravid, non-tender, non-distended    FHT: Baseline 125, mod variability, + accels, no decels, reactive NST  [place]: No ctx    Labs:  Ammonia, : 16  Ammonia, : 14  Ammonia, : 15  Ammonia, : <10    Assessment/Plan:   Myrtle Strickland is a 27 year old  @ 30w6d, admitted for rule-out  labor. Pregnancy complicated by h/o PTD at 34w4d, FHx of OTC deficiency, anxiety, depression, ADHD, bipolar, and Asperger's, psychogenic seizures, h/o seizure disorder, and rubella NI.    She has had no further contractions. Will monitor throughout the day without Indocin and discharge this afternoon if she remains comfortable.    # contractions, r/o PTL  - S/p BMZ course and indocin  - s/p 10h Mg for fetal neuroprotection, discontinue d/t patient discomfort  - GBS pending, s/p 10h PCN until pt's cervix was found to be stable at 2 cm  - Tylenol, Vistaril, heat as needed for pain  - Breech presentation, would require CS for delivery     #Family history of OTC deficiency  Patient has had negative molecular and genetic testing females can still exhibit signs  and symptoms of OTC deficiency even with negative testing.  Males typically have worse presentation, she does have a male baby.  Dr. Patterson discussed with metabolic team.   -Avoid catabolic state  -Ammonia level daily (has been normal)  -Baseline BMP  -Initially started on D10 NS at 100 ml/hr, but discontinued when patient was not found to be in  labor  -Regular diet     Anxiety  Depression  Bipolar  Psychogenic pseudoseizures  -Continue PTA Zoloft  -As needed Vistaril  -Supportive cares if patient has a pseudoseizure, no need for antiepileptics     Fetal well-being  -TID fetal monitoring    Seen and discussed with Dr. Carmona.    Clover Carlson MD  Maternal Fetal Medicine Fellow    Physician Attestation   I saw this patient with the resident and agree with the resident/fellow's findings and plan of care as documented in the note.      I personally reviewed labs, fetal monitoring and vitals    Key findings: This AM, stopped indocin.  Contractions improved compared to admission.. Will monitor throughout the day.  If stable, plan to discharge to home this PM.  Will transfer care to [facility] clinic.  Pt met with social work to arrange rides to clinic. F/u in 2 weeks.     Regina Carmona MD  Date of Service (when I saw the patient): 22

## 2022-08-26 NOTE — DISCHARGE INSTRUCTIONS
Discharge Instruction for Undelivered Patients      You were seen for: Labor Assessment  We Consulted: [facility] service  You had (Test or Medicine): pre-term labor evaluation     Diet:   Drink 8 to 12 glasses of liquids (milk, juice, water) every day.  You may eat meals and snacks.     Activity:  Call your doctor or nurse midwife if your baby is moving less than usual.     Call your provider if you notice:  Swelling in your face or increased swelling in your hands or legs.  Headaches that are not relieved by Tylenol (acetaminophen).  Changes in your vision (blurring: seeing spots or stars.)  Nausea (sick to your stomach) and vomiting (throwing up).   Weight gain of 5 pounds or more per week.  Heartburn that doesn't go away.  Signs of bladder infection: pain when you urinate (use the toilet), need to go more often and more urgently.  The bag of dominguez (rupture of membranes) breaks, or you notice leaking in your underwear.  Bright red blood in your underwear.  Abdominal (lower belly) or stomach pain.  For first baby: Contractions (tightening) less than 5 minutes apart for one hour or more.  Second (plus) baby: Contractions (tightening) less than 10 minutes apart and getting stronger.  *If less than 34 weeks: Contractions (tightening) more than 6 times in one hour.  Increase or change in vaginal discharge (note the color and amount)  Other: Call with any questions or concerns    Follow-up:  As scheduled in the clinic

## 2022-08-26 NOTE — PLAN OF CARE
Pt discharged home, ambulatory at 1650. She indicated understanding of discharge instructions and plan for follow-up care. Her mother was her ride.

## 2022-08-27 ENCOUNTER — HEALTH MAINTENANCE LETTER (OUTPATIENT)
Age: 27
End: 2022-08-27

## 2022-08-29 ENCOUNTER — PATIENT OUTREACH (OUTPATIENT)
Dept: CARE COORDINATION | Facility: CLINIC | Age: 27
End: 2022-08-29

## 2022-08-29 NOTE — PROGRESS NOTES
[facility] Care Resource Center Contact  [facility]/Voicemail     Clinical Data: Transitional Care Management Outreach     Outreach attempted x 2.  Left message on patient's voicemail, providing [facility]'s 24/7 scheduling and nurse triage phone number [phone] ([phone]) for questions/concerns and/or to schedule an appt with an [facility] provider, if they do not have a PCP.      Plan:  [facility] will do no further outreaches at this time.       Tracie Holley RN  Connected Care Resource Center, [facility]    *Connected Care Resource Team does NOT follow patient ongoing. Referrals are identified based on internal discharge reports and the outreach is to ensure patient has an understanding of their discharge instructions.

## 2022-09-07 NOTE — PROGRESS NOTES
Maternal-Fetal Medicine   First OB Visit    Myrtle Strickland  : 1995  MRN: [id number]    Myrtle Strickland is a transfer of care from [facility].    HPI:  Myrtle Strickland is a 27 year old  at 32w4d by LMP consistent with 7w1d US here for transfer of care OB visit.    She is here with her partner.    Patient was seen for MFM consultation by Dr. Camacho at 19w6d gestation on 22, please see that note for prior details.  She was admitted to [facility] 22-22 for rule out  labor and received a course of BMTZ.  Her cervix remained stable at 2-3/50/-1 and her contractions resolved.    Today she presents to clinic for transfer of care visit.  She reports overall doing well.  She does reports continuing anxiety and depression but denies self harm intent or suicidal ideation.  She feels the Zoloft is effective for her in pregnancy but has not been effective postpartum in the past.  In prior pregnancy she was told she could not take while breastfeeding.  She is interested in increasing her dose of Zoloft today and referral to  mental health.  She was previously seeing Gisel Patricia at [facility] who was prescribing Zoloft for her.  She denies recent seizure.  Denies regular contractions, vaginal bleeding, LOF.  Active fetal movement.  No additional concerns.     Pregnancy complicated by:  - history of  delivery at 34w4d  - family history of OTC deficiency  - anxiety, depression, ADHD, bipolar disorder, and autism spectrum disorder  - non epileptic seizure disorder  - hx eating disorder  - rubella, varicella NI    Obstetrics History:  OB History    Para Term  AB Living   4 1 0 1 2 1   SAB IAB Ectopic Multiple Live Births   2 0 0 0 1      # Outcome Date GA Lbr Karl/2nd Weight Sex Delivery Anes PTL Lv   4 Current            3  19 34w5d  2.34 kg (5 lb 2.5 oz) F Vag-Spont   CLARISSA      Name: Papo      Apgar1: 8  Apgar5: 9   2 SAB 13 6w0d          1 2010 4w0d               Gynecologic History:  - Menstrual history: Patient's last menstrual period was 01/22/2022.   - Last Pap: 03/2021 NILM    Past Medical History:  Past Medical History:   Diagnosis Date     ADHD (attention deficit hyperactivity disorder)      Anxiety      Depressive disorder      History of eating disorder      Psychogenic nonepileptic seizure      Rubella non-immune status, antepartum      Uncomplicated asthma        Past Surgical History:  Past Surgical History:   Procedure Laterality Date     AS REMOVAL OF TONSILS,<11 Y/O         Current Medications:  Prior to Admission medications    Medication Sig Last Dose Taking? Auth Provider Long Term End Date   metoclopramide (REGLAN) 10 MG tablet    Reported, Patient     ondansetron (ZOFRAN) 4 MG tablet ondansetron HCl 4 mg tablet   Take 1 tablet by mouth every 6-8 hours as directed.   Reported, Patient     Prenatal MV & Min w/FA-DHA (PRENATAL ADULT GUMMY/DHA/FA PO) Prenatal Gummies   Reported, Patient     promethazine (PHENERGAN) 25 MG tablet promethazine 25 mg tablet   Take 1 tablet by mouth every 6-8 hours by oral route.   Reported, Patient     sertraline (ZOLOFT) 50 MG tablet sertraline 50 mg tablet   Take 1 tablet every day by oral route.   Reported, Patient Yes        Allergies:  Guaifenesin, Adhesive tape, Mushroom, and Latex    Social History:   Social History     Tobacco Use     Smoking status: Passive Smoke Exposure - Never Smoker     Smokeless tobacco: Former User   Substance Use Topics     Alcohol use: Not Currently     Drug use: Not Currently       Family History:  Reported family history of OTC.  Please see genetic pedigree for full details.  Denies history of preeeclampsia, thromboembolic disease, bleeding disorders.    ROS:  As per HPI, otherwise negative.    PHYSICAL EXAM:  /70 (BP Location: Left arm, Patient Position: Sitting, Cuff Size: Adult Regular)   Pulse 73   Resp 18   Wt 70.3 kg (155 lb)   LMP 01/22/2022   SpO2 98%   BMI 25.79  kg/m      Gen: NAD, well appearing  Chest: Non-labored breathing  Abdomen: Gravid, non-tender  Extremities: No edema    Current weight: 155 lb:   Early pregnancy weight 2022: 135 lb  TW lb    Prenatal Labs:    22 glucola 109  22 hgb 11.9  T&S:  B negative, antibody negative  6/3/22 hgb 11.7, hematocrit 34.6, MCV 87,   22 syphilis antibody negative  3/21/22 HIV NR  3/21/22 hepatitis B surface antigen negative  3/21/22 rubella NON IMMUNE  3/31/19 varicella negative  22 urine culture no growth  19 NILM  3/9/22 GC/CT negative    Genetic Testing:   Low risk NIPT (in [place] records, media tab)  Negative msAFP     Ultrasounds:   Please see imaging tab/separate report for ultrasound performed at [facility] today    Labs:   Latest Reference Range & Units 22 15:55   Ammonia 10 - 50 umol/L 11        Latest Reference Range & Units 22 15:55   Orotic Acid Urine 0.20 - 1.50 mmol/mol Cr 1.06        Latest Reference Range & Units 22 15:55   Arginine 2 - 18 umol/dL 4   Citrulline 1.3 - 6.0 umol/dL 1.6   Glutamine 41 - 86 umol/dL 46   Glycine 13 - 50 umol/dL 18   Isoleucine 4 - 11 umol/dL 4   Leucine 8 - 21 umol/dL 7 (L)   Valine 8 - 46 umol/dL        ASSESSMENT/PLAN:  Myrtle Strickland is a 27 year old  at 32w4d by LMP consistent with 7w1d US here for transfer of care OB visit.  She is doing well today.    Pregnancy complicated by:  - history of  delivery at 34w4d  - family history of OTC deficiency  - anxiety, depression, ADHD, bipolar disorder, and autism spectrum disorder  - non epileptic seizure disorder  - hx eating disorder  - rubella NI    History of  delivery at 34w4d  // denies s/s  labor at this time  // received BMTZ course -   // SVE during last admission -3/50/-1  // urine culture negative 22  // GBS not yet done  // strict precautions given    Family history of OTC deficiency  // follows with [facility], has seen both prenatal  genetics and pediatric genetics, last visit 8/18/22  // history of clinical diagnosis however without evidence supported by biochemical or molecular analysis; normal OTC sequencing and deletion/duplication analysis (on a genome platform evaluating both exonic and intronic variants)  // self restricted protein intake at 0.75 g/kg/day  // normal ammonia, urine orotic acid  // low normal plasma citrulline that could be attributed to the protein restriction  // In females, diagnosis of OTC cannot be done just based on the biochemical parameters since it may be normal.  As per the literature, 10-15% of patients with OTC may not have a molecular diagnosis.  At this time, we do not have any definite evidence of OTC in Myrtle from biochemical or molecular analysis, however, with the strong clinical history of hyperammonemia and protein intolerance, the possibility of Myrtle being in the 10-15% described above cannot be ruled out.  // Peds GC contacting the hospitals where Myrtle and her sister and mother have been admitted to evaluate prior testing. [facility] Pool prenatal GC contacted today and will discuss at combined pediatrics-prenatal meeting next Tuesday.  // Metabolism on call provider will also assist in the appropriate monitoring and management of Myrtle's son after birth  // letter in patient's chart from Dr. June with the following care plan:    It would be safe to monitor her for OTC related symptoms     Myrtle is especially vulnerable to acute exacerbations with poor oral intake, prolonged fasting, and severe body stresses such as dehydration, fever, viral or bacterial illnesses, surgery, or a severe catabolic state or high protein intake. This include labor and delivery as well.     This letter is not exhaustive and is not a substitute for contact with the Genetics and Metabolism physician on call available 24 hours/day via the page  ([phone]).  Please initiate the protocol below and contact us  immediately.      At the time of admission:    D10 1/2NS at 1 1/2 times maintenance with appropriate electrolytes once NPO.     If no enteral intake estimated > 12 hours or if this is anticipated to occur, please contact genetics physician on call for other alternative including IV lipids.     Ammonia levels should be monitored closely.  Recommend Q6h ammonia levels at the time of admission. If normal x 3, it could be spaced out as per the recommendations of the on call physician. Continue to monitor ammonia in the post delivery state too.    Pre-coordination with Metabolism is needed if surgery/anesthesia is required.    Please contact Genetics and Metabolism on call provider for management of the  after delivery.    Immediate Laboratory Studies to Order:    Blood glucose, electrolytes, liver function tests    Ammonia    Anxiety  Depression  ADHD  Bipolar disorder  Autism spectrum disorder  // PHQ9 today 17  // on zoloft 50 mg daily, will increase to 75 mg, sent to pharmacy on file  // referral to  mental health placed today, Gisel Meraz    Non epileptic seizure disorder  // follows with neurology, last visit with Aide Cruz 22, suspect non-epileptic seizures triggered by stress, recommend headache diary, PT, ice, continue working with therapist, standard seizure precautions (no driving, swimming, soaking)  // EEG  negative, 22 EEG WNL, 2020 head CT normal  // consider brain MRI after pregnancy  // encouraged to schedule postpartum visit with neuro    PNC  // glucola WNL 22  // TDAP given 22 per outside records  // declines COVID vaccine  // Rh negative, received Rhogam ~28w as antibody screen positive Anti-D post Rhogam on 22  // Growth US q4 weeks, today 31%, next at 36w5d  // no indication for  surveillance  // plans to deliver at [facility]  // contraception:  Considering NuvaRing  // does not desire epidural or IV opioids; is interested in  nitrous oxide    RTC in 2 weeks for OB visit.  RTC in 4 weeks for OB visit and growth US.      Patient seen and staffed with Dr. Janice Sullivan MD   Maternal-Fetal Medicine Fellow, PGY6  9/7/2022 2:01 PM

## 2022-09-08 ENCOUNTER — OFFICE VISIT (OUTPATIENT)
Dept: MATERNAL FETAL MEDICINE | Facility: CLINIC | Age: 27
End: 2022-09-08
Attending: OBSTETRICS & GYNECOLOGY
Payer: COMMERCIAL

## 2022-09-08 ENCOUNTER — HOSPITAL ENCOUNTER (OUTPATIENT)
Dept: ULTRASOUND IMAGING | Facility: CLINIC | Age: 27
Discharge: HOME | End: 2022-09-08
Attending: OBSTETRICS & GYNECOLOGY
Payer: COMMERCIAL

## 2022-09-08 VITALS
HEART RATE: 73 BPM | OXYGEN SATURATION: 98 % | WEIGHT: 155 LBS | RESPIRATION RATE: 18 BRPM | SYSTOLIC BLOOD PRESSURE: 110 MMHG | BODY MASS INDEX: 25.79 KG/M2 | DIASTOLIC BLOOD PRESSURE: 70 MMHG

## 2022-09-08 DIAGNOSIS — G40.909 SEIZURE DISORDER (H): ICD-10-CM

## 2022-09-08 DIAGNOSIS — F90.9 ATTENTION DEFICIT HYPERACTIVITY DISORDER (ADHD), UNSPECIFIED ADHD TYPE: ICD-10-CM

## 2022-09-08 DIAGNOSIS — F41.9 ANXIETY DISORDER, UNSPECIFIED TYPE: ICD-10-CM

## 2022-09-08 DIAGNOSIS — O47.00 PRETERM CONTRACTIONS: ICD-10-CM

## 2022-09-08 DIAGNOSIS — O09.893 HISTORY OF PRETERM DELIVERY, CURRENTLY PREGNANT IN THIRD TRIMESTER: Primary | ICD-10-CM

## 2022-09-08 DIAGNOSIS — O09.893 HISTORY OF PRETERM DELIVERY, CURRENTLY PREGNANT IN THIRD TRIMESTER: ICD-10-CM

## 2022-09-08 PROCEDURE — G0463 HOSPITAL OUTPT CLINIC VISIT: HCPCS | Mod: 25

## 2022-09-08 PROCEDURE — 76816 OB US FOLLOW-UP PER FETUS: CPT | Mod: 26 | Performed by: OBSTETRICS & GYNECOLOGY

## 2022-09-08 PROCEDURE — 76816 OB US FOLLOW-UP PER FETUS: CPT

## 2022-09-08 PROCEDURE — 99212 OFFICE O/P EST SF 10 MIN: CPT | Mod: 25 | Performed by: OBSTETRICS & GYNECOLOGY

## 2022-09-08 ASSESSMENT — PATIENT HEALTH QUESTIONNAIRE - PHQ9: SUM OF ALL RESPONSES TO PHQ QUESTIONS 1-9: 17

## 2022-09-08 NOTE — NURSING NOTE
Pt here for growth US abd OBFV due to pregnancy complicated by seizure disorder, family hx OTC deficiency and hx  labor.Pt reports good fetal movement, ctx q20 minutes but denies vag bleeding/ fluid. Pt on zoloft and reports zoloft ineffective postpartum with previous preg. Pt would like to discuss other options for pp management. Dr. Sullivan and Dr. Camacho in to see pt. Behavioral health referral placed. Pt left amb and stable.

## 2022-09-09 ENCOUNTER — HOSPITAL ENCOUNTER (OUTPATIENT)
Facility: CLINIC | Age: 27
Discharge: HOME | End: 2022-09-09
Attending: OBSTETRICS & GYNECOLOGY | Admitting: OBSTETRICS & GYNECOLOGY
Payer: COMMERCIAL

## 2022-09-09 ENCOUNTER — DOCUMENTATION ONLY (OUTPATIENT)
Dept: CONSULT | Facility: CLINIC | Age: 27
End: 2022-09-09

## 2022-09-09 ENCOUNTER — HOSPITAL ENCOUNTER (OUTPATIENT)
Facility: CLINIC | Age: 27
End: 2022-09-09
Admitting: OBSTETRICS & GYNECOLOGY
Payer: COMMERCIAL

## 2022-09-09 VITALS — DIASTOLIC BLOOD PRESSURE: 71 MMHG | TEMPERATURE: 97.9 F | SYSTOLIC BLOOD PRESSURE: 111 MMHG | RESPIRATION RATE: 16 BRPM

## 2022-09-09 DIAGNOSIS — O99.350 SEIZURE DISORDER DURING PREGNANCY, ANTEPARTUM (H): ICD-10-CM

## 2022-09-09 DIAGNOSIS — O47.00 PRETERM CONTRACTIONS: Primary | ICD-10-CM

## 2022-09-09 DIAGNOSIS — O09.893 HISTORY OF PRETERM DELIVERY, CURRENTLY PREGNANT IN THIRD TRIMESTER: Primary | ICD-10-CM

## 2022-09-09 DIAGNOSIS — G40.909 SEIZURE DISORDER DURING PREGNANCY, ANTEPARTUM (H): ICD-10-CM

## 2022-09-09 LAB
ABO/RH(D): ABNORMAL
ALBUMIN UR-MCNC: 50 MG/DL
AMMONIA PLAS-SCNC: 20 UMOL/L (ref 10–50)
ANION GAP SERPL CALCULATED.3IONS-SCNC: 6 MMOL/L (ref 3–14)
ANTIBODY SCREEN: POSITIVE
APPEARANCE UR: CLEAR
BILIRUB UR QL STRIP: NEGATIVE
BUN SERPL-MCNC: 8 MG/DL (ref 7–30)
C TRACH DNA SPEC QL PROBE+SIG AMP: NEGATIVE
CALCIUM SERPL-MCNC: 8.6 MG/DL (ref 8.5–10.1)
CHLORIDE BLD-SCNC: 111 MMOL/L (ref 94–109)
CLUE CELLS: PRESENT
CO2 SERPL-SCNC: 23 MMOL/L (ref 20–32)
COLOR UR AUTO: YELLOW
CREAT SERPL-MCNC: 0.66 MG/DL (ref 0.52–1.04)
ERYTHROCYTE [DISTWIDTH] IN BLOOD BY AUTOMATED COUNT: 12.5 % (ref 10–15)
GFR SERPL CREATININE-BSD FRML MDRD: >90 ML/MIN/1.73M2
GLUCOSE BLD-MCNC: 109 MG/DL (ref 70–99)
GLUCOSE UR STRIP-MCNC: NEGATIVE MG/DL
HCT VFR BLD AUTO: 34.3 % (ref 35–47)
HGB BLD-MCNC: 11.3 G/DL (ref 11.7–15.7)
HGB UR QL STRIP: NEGATIVE
HOLD SPECIMEN: NORMAL
KETONES UR STRIP-MCNC: 10 MG/DL
LEUKOCYTE ESTERASE UR QL STRIP: ABNORMAL
MCH RBC QN AUTO: 28.6 PG (ref 26.5–33)
MCHC RBC AUTO-ENTMCNC: 32.9 G/DL (ref 31.5–36.5)
MCV RBC AUTO: 87 FL (ref 78–100)
MUCOUS THREADS #/AREA URNS LPF: PRESENT /LPF
N GONORRHOEA DNA SPEC QL NAA+PROBE: NEGATIVE
NITRATE UR QL: NEGATIVE
PH UR STRIP: 5.5 [PH] (ref 5–7)
PLATELET # BLD AUTO: 257 10E3/UL (ref 150–450)
POTASSIUM BLD-SCNC: 3.3 MMOL/L (ref 3.4–5.3)
RBC # BLD AUTO: 3.95 10E6/UL (ref 3.8–5.2)
RBC URINE: 2 /HPF
SODIUM SERPL-SCNC: 140 MMOL/L (ref 133–144)
SP GR UR STRIP: 1.03 (ref 1–1.03)
SPECIMEN EXPIRATION DATE: ABNORMAL
SQUAMOUS EPITHELIAL: 4 /HPF
T PALLIDUM AB SER QL: NONREACTIVE
TRICHOMONAS, WET PREP: ABNORMAL
UROBILINOGEN UR STRIP-MCNC: NORMAL MG/DL
WBC # BLD AUTO: 7.4 10E3/UL (ref 4–11)
WBC URINE: 4 /HPF
WBC'S/HIGH POWER FIELD, WET PREP: ABNORMAL
YEAST, WET PREP: ABNORMAL

## 2022-09-09 PROCEDURE — 87210 SMEAR WET MOUNT SALINE/INK: CPT | Performed by: STUDENT IN AN ORGANIZED HEALTH CARE EDUCATION/TRAINING PROGRAM

## 2022-09-09 PROCEDURE — 59025 FETAL NON-STRESS TEST: CPT

## 2022-09-09 PROCEDURE — 86850 RBC ANTIBODY SCREEN: CPT | Performed by: STUDENT IN AN ORGANIZED HEALTH CARE EDUCATION/TRAINING PROGRAM

## 2022-09-09 PROCEDURE — 87086 URINE CULTURE/COLONY COUNT: CPT | Performed by: STUDENT IN AN ORGANIZED HEALTH CARE EDUCATION/TRAINING PROGRAM

## 2022-09-09 PROCEDURE — 86780 TREPONEMA PALLIDUM: CPT | Performed by: STUDENT IN AN ORGANIZED HEALTH CARE EDUCATION/TRAINING PROGRAM

## 2022-09-09 PROCEDURE — 96361 HYDRATE IV INFUSION ADD-ON: CPT

## 2022-09-09 PROCEDURE — 81001 URINALYSIS AUTO W/SCOPE: CPT | Performed by: STUDENT IN AN ORGANIZED HEALTH CARE EDUCATION/TRAINING PROGRAM

## 2022-09-09 PROCEDURE — 96365 THER/PROPH/DIAG IV INF INIT: CPT

## 2022-09-09 PROCEDURE — 96366 THER/PROPH/DIAG IV INF ADDON: CPT

## 2022-09-09 PROCEDURE — 96360 HYDRATION IV INFUSION INIT: CPT

## 2022-09-09 PROCEDURE — 258N000001 HC RX 258: Performed by: STUDENT IN AN ORGANIZED HEALTH CARE EDUCATION/TRAINING PROGRAM

## 2022-09-09 PROCEDURE — 87491 CHLMYD TRACH DNA AMP PROBE: CPT | Performed by: STUDENT IN AN ORGANIZED HEALTH CARE EDUCATION/TRAINING PROGRAM

## 2022-09-09 PROCEDURE — 86901 BLOOD TYPING SEROLOGIC RH(D): CPT | Performed by: STUDENT IN AN ORGANIZED HEALTH CARE EDUCATION/TRAINING PROGRAM

## 2022-09-09 PROCEDURE — 85027 COMPLETE CBC AUTOMATED: CPT | Performed by: STUDENT IN AN ORGANIZED HEALTH CARE EDUCATION/TRAINING PROGRAM

## 2022-09-09 PROCEDURE — G0463 HOSPITAL OUTPT CLINIC VISIT: HCPCS | Mod: 25

## 2022-09-09 PROCEDURE — 80048 BASIC METABOLIC PNL TOTAL CA: CPT | Performed by: STUDENT IN AN ORGANIZED HEALTH CARE EDUCATION/TRAINING PROGRAM

## 2022-09-09 PROCEDURE — 82140 ASSAY OF AMMONIA: CPT | Performed by: STUDENT IN AN ORGANIZED HEALTH CARE EDUCATION/TRAINING PROGRAM

## 2022-09-09 RX ORDER — HYDROXYZINE HYDROCHLORIDE 25 MG/1
25-50 TABLET, FILM COATED ORAL
Qty: 30 TABLET | Refills: 0 | Status: SHIPPED | OUTPATIENT
Start: 2022-09-09 | End: 2023-08-23

## 2022-09-09 RX ORDER — HYDROXYZINE HYDROCHLORIDE 50 MG/1
50 TABLET, FILM COATED ORAL EVERY 6 HOURS PRN
Status: DISCONTINUED | OUTPATIENT
Start: 2022-09-09 | End: 2022-09-09 | Stop reason: HOSPADM

## 2022-09-09 RX ORDER — HYDROXYZINE HYDROCHLORIDE 25 MG/1
25-50 TABLET, FILM COATED ORAL
Qty: 30 TABLET | Refills: 0 | Status: SHIPPED | OUTPATIENT
Start: 2022-09-09 | End: 2022-09-09

## 2022-09-09 RX ORDER — HYDROXYZINE HYDROCHLORIDE 25 MG/1
25 TABLET, FILM COATED ORAL EVERY 6 HOURS PRN
Status: DISCONTINUED | OUTPATIENT
Start: 2022-09-09 | End: 2022-09-09 | Stop reason: HOSPADM

## 2022-09-09 RX ADMIN — DEXTROSE AND SODIUM CHLORIDE: 10; .45 INJECTION, SOLUTION INTRAVENOUS at 03:12

## 2022-09-09 ASSESSMENT — ACTIVITIES OF DAILY LIVING (ADL)
ADLS_ACUITY_SCORE: 18

## 2022-09-09 NOTE — PLAN OF CARE
Data: [facility].   Reason for maternal/fetal assessment per patient is Contractions  . Patient is a . Prenatal record reviewed.      OB History    Para Term  AB Living   4 1 0 1 2 1   SAB IAB Ectopic Multiple Live Births   2 0 0 0 1      # Outcome Date GA Lbr Karl/2nd Weight Sex Delivery Anes PTL Lv   4 Current            3  19 34w5d  2.34 kg (5 lb 2.5 oz) F Vag-Spont   CLARISSA      Name: Papo      Apgar1: 8  Apgar5: 9   2 SAB 13 6w0d          1 2010 4w0d             Medical History:   Past Medical History:   Diagnosis Date     ADHD (attention deficit hyperactivity disorder)      Anxiety      Depressive disorder      History of eating disorder      Psychogenic nonepileptic seizure      Rubella non-immune status, antepartum      Uncomplicated asthma    . Gestational Age 32w6d. VSS. Cervix: dilated to 3.  Fetal movement present. Patient denies backache, vaginal discharge, pelvic pressure, UTI symptoms, GI problems, bloody show, edema, headache, visual disturbances, epigastric or URQ pain, rupture of membranes. Support persons is present.  Action: Verbal consent for EFM. Triage assessment completed. EFM applied for NST. Uterine assessment regular contractions. Fetal assessment: Presumed adequate fetal oxygenation documented (see flow record). Patient education pamphlets given on fetal movement counts and  labor. Patient instructed to report change in fetal movement, vaginal leaking of fluid or bleeding, abdominal pain, or any concerns related to the pregnancy to her nurse/physician.   Response: Dr. Steele informed of patient's arrival. Plan per provider is to perform an NST, administer fluids, and labs. Cervical exam x3 remain unchanged. Contractions spaced and less painful. Patient verbalized understanding of education and verbalized agreement with plan. Discharged ambulatory at 0715.

## 2022-09-09 NOTE — PROGRESS NOTES
We obtained releases of information to attempt to get records of Myrtle's and her mother's OTC diagnosis. [facility] and [facility] from [facility], we were unable to find any documentation of hyperammonemia for Myrtle or her mother.      Sahra Aguayo MS, [facility]  Genetic Counselor  [facility]  Phone: [phone]

## 2022-09-09 NOTE — DISCHARGE INSTRUCTIONS
Discharge Instruction for Undelivered Patients      You were seen for: Labor Assessment  We Consulted: Dr Lopez  You had (Test or Medicine):NST     Diet:   Drink 8 to 12 glasses of liquids (milk, juice, water) every day.  You may eat meals and snacks.     Activity:  Count fetal kicks everyday (see handout)  Call your doctor or nurse midwife if your baby is moving less than usual.     Call your provider if you notice:  Swelling in your face or increased swelling in your hands or legs.  Headaches that are not relieved by Tylenol (acetaminophen).  Changes in your vision (blurring: seeing spots or stars.)  Nausea (sick to your stomach) and vomiting (throwing up).   Weight gain of 5 pounds or more per week.  Heartburn that doesn't go away.  Signs of bladder infection: pain when you urinate (use the toilet), need to go more often and more urgently.  The bag of dominguez (rupture of membranes) breaks, or you notice leaking in your underwear.  Bright red blood in your underwear.  Abdominal (lower belly) or stomach pain.  Second (plus) baby: Contractions (tightening) less than 10 minutes apart and getting stronger.  *If less than 34 weeks: Contractions (tightening) more than 6 times in one hour.  Increase or change in vaginal discharge (note the color and amount)      Follow-up:  As scheduled in the clinic

## 2022-09-09 NOTE — PLAN OF CARE
[facility]. Complaints of contractions and vaginal bleeding. Hx previous  delivery. EFM applied.

## 2022-09-09 NOTE — PROVIDER NOTIFICATION
09/09/22 0500   Provider Notification   Provider Name/Title Dr Steele   Method of Notification In Department   Notification Reason Status Update     Plan for continued monitoring for 2 hours and recheck cervix once more, if unchanged patient will be probable discharge.

## 2022-09-09 NOTE — PROGRESS NOTES
"Obstetrics Labor & Delivery  Triage Note     Myrtle Strickland MRN# [id number]   YOB: 1995 Age: 27 year old      Date of Admission: 2022    Primary care provider: No Ref-Primary, Physician      Assessment and Plan:       27 year old  at 32w6d by 7w US, here for  labor evaluation. Pregnancy is notable for hx PTB at 34w,  contractions at 30w this pregnancy, family history of OTC deficiency, anxiety/depression/bipolar disorder, autism spectrum disorder, PNEA, hx eating disorder.    #  contractions  - Cervix unchanged from prior admission at 3cm over several checks  - Contractions spaced after IV fluids, and UA with ketones - suspect a component of dehydration  - Not in  labor at this time  - PTL workup overall unremarkable; wet prep w/ clue cells however pt asymptomatic, no discharge - will not treat  - Discharge with short rx vistaril for therapeutic sleep at home  - Reviewed return precautions and low threshold for return for evaluation given hx PTD and breech presentation    # Family hx OTC deficiency  - IVF with D10 NS  - BMP and ammonia below overall unremarkable    # PNC  - Rh NEGATIVE, Rubella non-immune, , GBS negative  - Other prenatal labs wnl  - Imaging: anatomy wnl, placenta anterior     # FWB:   Cat I tracing, reactive; BREECH by BSUS  - S/p BMZ -    Patient discussed with Dr. Patterson          HPI:     Myrtle Strickland is a 27 year old  at 32w6d by LMP c/w 7w1d US who presents today for painful contractions. She reports she has had \"timeable contractions\" since 20 weeks of pregnancy. Tonight is different as for the last few hours they have been closer together and more intense. Previously were every 15 minutes and now more like every 5 minutes. Also notes some vaginal bleeding yesterday, which she noticed after doing a puzzle and when she stood up she had blood on her foot after sitting on it. No intercourse or anything in the vagina in the " last 24 hours. Other than some baseline nausea and occasional vomiting which is normal for her, no new symptoms or other concerns tonight. Notes baby was breech on her last ultrasound. Last ate pizza around 1030pm last night.    Pregnancy notable for:   - history of  delivery at 34w4d  -  contractions at 30w, s/p BMZ course  - family history of OTC deficiency  - anxiety, depression, ADHD, bipolar disorder, and autism spectrum disorder  - non epileptic seizure disorder  - hx eating disorder  - rubella, varicella NI    OB History:    OB History    Para Term  AB Living   4 1 0 1 2 1   SAB IAB Ectopic Multiple Live Births   2 0 0 0 1      # Outcome Date GA Lbr Karl/2nd Weight Sex Delivery Anes PTL Lv   4 Current            3  19 34w5d  2.34 kg (5 lb 2.5 oz) F Vag-Spont   CLARISSA      Name: Papo      Apgar1: 8  Apgar5: 9   2 SAB 13 6w0d          1 2010 4w0d               Prenatal Lab Results:  Lab Results   Component Value Date    AS Positive (A) 2022    HGB 11.3 (L) 2022       GBS Status:   No results found for: GBS             Past Medical History:     Past Medical History:   Diagnosis Date    ADHD (attention deficit hyperactivity disorder)     Anxiety     Depressive disorder     History of eating disorder     Psychogenic nonepileptic seizure     Rubella non-immune status, antepartum     Uncomplicated asthma              Past Surgical History:     Past Surgical History:   Procedure Laterality Date    AS REMOVAL OF TONSILS,<13 Y/O               Social History:     Social History     Tobacco Use    Smoking status: Passive Smoke Exposure - Never Smoker    Smokeless tobacco: Former User   Substance Use Topics    Alcohol use: Not Currently             Family History:   No family history on file.          Immunizations:     Immunization History   Administered Date(s) Administered    DTAP (<7y) 1996, 1999    DTP-Hib 1995, 1995,  1995    FLU 6-35 months 10/18/2005, 10/19/2006, 10/08/2008, 09/04/2009, 11/19/2012    Flu, Unspecified 10/18/2005, 10/19/2006, 10/08/2008, 09/04/2009    HPV Quadrivalent 06/11/2008, 09/05/2008, 12/08/2008    Hep B, Peds or Adolescent 1995, 1995, 1995    HepA-ped 2 Dose 08/16/2007, 06/11/2008    HepB-Adult 1995, 1995, 1995    Hib (PRP-T) 1995, 1995, 1995, 09/24/1996    Historical DTP/aP 1995, 1995, 1995    Influenza Vaccine IM > 6 months Valent IIV4 (Alfuria,Fluzone) 10/18/2005, 10/19/2006, 10/08/2008, 09/04/2009, 11/19/2012    MMR 06/20/1996, 03/23/1999    Meningococcal (Menactra ) 08/16/2007    Meningococcal (Menomune ) 08/16/2007    OPV, trivalent, live 1995, 1995, 06/20/1996, 03/23/1999    OPV, unspecified 1995, 1995, 06/20/1996, 03/23/1999    Rhogam 06/18/2013    TRIHIBIT (DTAP/HIB, <7y) 09/24/1996, 03/23/1999    Tdap (Adacel,Boostrix) 06/05/2006, 05/14/2019    Varicella 04/07/1998, 08/16/2007            Allergies:     Allergies   Allergen Reactions    Guaifenesin Hives and Nausea and Vomiting    Adhesive Tape Other (See Comments)     Burns only with [facility]proof bandages. [facility].  Burns only with waterproof bandages. [facility] T[facility] and [facility] is okay.  Burns only with waterproof bandages. [facility] T[facility] and tape is okay.  Burns only with waterproof bandages. [facility] T[facility] and tape is okay.      Mushroom     Latex Hives, Itching and Rash             Medications:     Medications Prior to Admission   Medication Sig Dispense Refill Last Dose    ondansetron (ZOFRAN) 4 MG tablet ondansetron HCl 4 mg tablet   Take 1 tablet by mouth every 6-8 hours as directed.   Past Month at Unknown time    Prenatal MV & Min w/FA-DHA (PRENATAL ADULT GUMMY/DHA/FA PO) Prenatal Gummies   Past Week at Unknown time    promethazine (PHENERGAN) 25 MG tablet promethazine 25 mg  tablet   Take 1 tablet by mouth every 6-8 hours by oral route.   Past Month at Unknown time    sertraline (ZOLOFT) 50 MG tablet Take 1.5 tablets (75 mg) by mouth daily 60 tablet 1 9/8/2022 at Unknown time    sertraline (ZOLOFT) 50 MG tablet    9/8/2022 at Unknown time    metoclopramide (REGLAN) 10 MG tablet  (Patient not taking: Reported on 9/8/2022)                Review of Systems & Physical Exam:     The Review of Systems is negative other than noted in the HPI      /71   Temp 98  F (36.7  C) (Oral)   Resp 18   LMP 01/22/2022   Gen: Uncomfortable with ctx  CV: Reg rate  Lungs: non-labored breathing  Abd: Gravid, non-tender, non-distended  Ext: No peripheral extremity edema    SSE: cervix appears thick, 1-2cm dilated, no leaking or pooling, no bleeding. Physiologic discharge/mucus in vaginal vault  Cervix: 3/50/-3, ballotable  Membranes: intact  Presentation: BREECH by BSUS.    FHT:  Monitoring External  FHT: Baseline 125 bpm; moderate variability; accels present; no decelerations  TOCO 3 contractions in 10 minutes         Data:   Labs:  Component      Latest Ref Rng & Units 9/9/2022   Color Urine      Colorless, Straw, Light Yellow, Yellow Yellow   Appearance Urine      Clear Clear   Glucose Urine      Negative mg/dL Negative   Bilirubin Urine      Negative Negative   Ketones Urine      Negative mg/dL 10 (A)   Specific Gravity Urine      1.003 - 1.035 1.034   Blood Urine      Negative Negative   pH Urine      5.0 - 7.0 5.5   Protein Albumin Urine      Negative mg/dL 50 (A)   Urobilinogen mg/dL      Normal, 2.0 mg/dL Normal   Nitrite Urine      Negative Negative   Leukocyte Esterase Urine      Negative Moderate (A)   Mucus Urine      None Seen /LPF Present (A)   RBC Urine      <=2 /HPF 2   WBC Urine      <=5 /HPF 4   Squamous Epithelial /HPF Urine      <=1 /HPF 4 (H)   Sodium      133 - 144 mmol/L 140   Potassium      3.4 - 5.3 mmol/L 3.3 (L)   Chloride      94 - 109 mmol/L 111 (H)   Carbon Dioxide       20 - 32 mmol/L 23   Anion Gap      3 - 14 mmol/L 6   Urea Nitrogen      7 - 30 mg/dL 8   Creatinine      0.52 - 1.04 mg/dL 0.66   Calcium      8.5 - 10.1 mg/dL 8.6   Glucose      70 - 99 mg/dL 109 (H)   GFR Estimate      >60 mL/min/1.73m2 >90   WBC      4.0 - 11.0 10e3/uL 7.4   RBC Count      3.80 - 5.20 10e6/uL 3.95   Hemoglobin      11.7 - 15.7 g/dL 11.3 (L)   Hematocrit      35.0 - 47.0 % 34.3 (L)   MCV      78 - 100 fL 87   MCH      26.5 - 33.0 pg 28.6   MCHC      31.5 - 36.5 g/dL 32.9   RDW      10.0 - 15.0 % 12.5   Platelet Count      150 - 450 10e3/uL 257   Trichomonas      Absent Absent   Yeast      Absent Absent   Clue cells      Absent Present (A)   WBCs/high power field      None 3+ (A)   ABO/Rh(D)       B NEG   Antibody Screen      Negative Positive (A)   SPECIMEN EXPIRATION DATE          Ammonia      10 - 50 umol/L 20       Imaging:  OB US 2022 - BREECH presentation, placenta anterior fundal, BILLY normal, EFW 31%    Tracy Steele MD  ObGyn, PGY-3  22 2:33 AM     FACULTY NOTE:  I discussed the patient with Dr. Steele.  I did not personally see her, but I am familiar with her from a prior admission.  She was dehydrated and her contractions spaced out considerably. She was assessed over several hours and was not in  labor.  Patient was discharged home with precautions.  Fetus was Category 1.     Mariana Patterson DO FACOG  Maternal Fetal Medicine Specialist  Pager: [phone]  Mobile: [phone]

## 2022-09-10 LAB — BACTERIA UR CULT: NO GROWTH

## 2022-09-17 ENCOUNTER — ANESTHESIA (OUTPATIENT)
Dept: OBGYN | Facility: CLINIC | Age: 27
End: 2022-09-17
Payer: COMMERCIAL

## 2022-09-17 ENCOUNTER — ANESTHESIA EVENT (OUTPATIENT)
Dept: OBGYN | Facility: CLINIC | Age: 27
End: 2022-09-17
Payer: COMMERCIAL

## 2022-09-17 ENCOUNTER — HOSPITAL ENCOUNTER (INPATIENT)
Facility: CLINIC | Age: 27
LOS: 3 days | Discharge: HOME HEALTH | End: 2022-09-20
Attending: OBSTETRICS & GYNECOLOGY | Admitting: OBSTETRICS & GYNECOLOGY
Payer: COMMERCIAL

## 2022-09-17 DIAGNOSIS — F41.9 ANXIETY DISORDER, UNSPECIFIED TYPE: ICD-10-CM

## 2022-09-17 DIAGNOSIS — F90.9 ATTENTION DEFICIT HYPERACTIVITY DISORDER (ADHD), UNSPECIFIED ADHD TYPE: ICD-10-CM

## 2022-09-17 DIAGNOSIS — F84.5 ASPERGER'S DISORDER: ICD-10-CM

## 2022-09-17 PROBLEM — Z36.89 ENCOUNTER FOR TRIAGE IN PREGNANT PATIENT: Status: ACTIVE | Noted: 2022-09-17

## 2022-09-17 LAB
ABO/RH(D): ABNORMAL
ANTIBODY SCREEN: POSITIVE
BLD PROD TYP BPU: NORMAL
BLD PROD TYP BPU: NORMAL
BLOOD COMPONENT TYPE: NORMAL
BLOOD COMPONENT TYPE: NORMAL
CODING SYSTEM: NORMAL
CODING SYSTEM: NORMAL
CROSSMATCH: NORMAL
CROSSMATCH: NORMAL
ERYTHROCYTE [DISTWIDTH] IN BLOOD BY AUTOMATED COUNT: 12.4 % (ref 10–15)
HCT VFR BLD AUTO: 33 % (ref 35–47)
HGB BLD-MCNC: 11.1 G/DL (ref 11.7–15.7)
MCH RBC QN AUTO: 28.1 PG (ref 26.5–33)
MCHC RBC AUTO-ENTMCNC: 33.6 G/DL (ref 31.5–36.5)
MCV RBC AUTO: 84 FL (ref 78–100)
PLATELET # BLD AUTO: 275 10E3/UL (ref 150–450)
RBC # BLD AUTO: 3.95 10E6/UL (ref 3.8–5.2)
RUPTURE OF FETAL MEMBRANES BY ROM PLUS: POSITIVE
SARS-COV-2 RNA RESP QL NAA+PROBE: NEGATIVE
SPECIMEN EXPIRATION DATE: ABNORMAL
UNIT ABO/RH: NORMAL
UNIT ABO/RH: NORMAL
UNIT NUMBER: NORMAL
UNIT NUMBER: NORMAL
UNIT STATUS: NORMAL
UNIT STATUS: NORMAL
UNIT TYPE ISBT: 1700
UNIT TYPE ISBT: 1700
WBC # BLD AUTO: 7.8 10E3/UL (ref 4–11)

## 2022-09-17 PROCEDURE — 250N000011 HC RX IP 250 OP 636: Performed by: STUDENT IN AN ORGANIZED HEALTH CARE EDUCATION/TRAINING PROGRAM

## 2022-09-17 PROCEDURE — 250N000013 HC RX MED GY IP 250 OP 250 PS 637: Performed by: STUDENT IN AN ORGANIZED HEALTH CARE EDUCATION/TRAINING PROGRAM

## 2022-09-17 PROCEDURE — 710N000010 HC RECOVERY PHASE 1, LEVEL 2, PER MIN: Performed by: OBSTETRICS & GYNECOLOGY

## 2022-09-17 PROCEDURE — 250N000013 HC RX MED GY IP 250 OP 250 PS 637

## 2022-09-17 PROCEDURE — 258N000003 HC RX IP 258 OP 636

## 2022-09-17 PROCEDURE — 272N000001 HC OR GENERAL SUPPLY STERILE: Performed by: OBSTETRICS & GYNECOLOGY

## 2022-09-17 PROCEDURE — 86920 COMPATIBILITY TEST SPIN: CPT | Performed by: STUDENT IN AN ORGANIZED HEALTH CARE EDUCATION/TRAINING PROGRAM

## 2022-09-17 PROCEDURE — 250N000011 HC RX IP 250 OP 636: Performed by: ANESTHESIOLOGY

## 2022-09-17 PROCEDURE — 360N000076 HC SURGERY LEVEL 3, PER MIN: Performed by: OBSTETRICS & GYNECOLOGY

## 2022-09-17 PROCEDURE — U0005 INFEC AGEN DETEC AMPLI PROBE: HCPCS | Performed by: STUDENT IN AN ORGANIZED HEALTH CARE EDUCATION/TRAINING PROGRAM

## 2022-09-17 PROCEDURE — 36415 COLL VENOUS BLD VENIPUNCTURE: CPT | Performed by: STUDENT IN AN ORGANIZED HEALTH CARE EDUCATION/TRAINING PROGRAM

## 2022-09-17 PROCEDURE — 271N000001 HC OR GENERAL SUPPLY NON-STERILE: Performed by: OBSTETRICS & GYNECOLOGY

## 2022-09-17 PROCEDURE — 250N000009 HC RX 250

## 2022-09-17 PROCEDURE — 59514 CESAREAN DELIVERY ONLY: CPT | Mod: GC | Performed by: OBSTETRICS & GYNECOLOGY

## 2022-09-17 PROCEDURE — 250N000011 HC RX IP 250 OP 636

## 2022-09-17 PROCEDURE — C9290 INJ, BUPIVACAINE LIPOSOME: HCPCS | Performed by: ANESTHESIOLOGY

## 2022-09-17 PROCEDURE — 258N000003 HC RX IP 258 OP 636: Performed by: STUDENT IN AN ORGANIZED HEALTH CARE EDUCATION/TRAINING PROGRAM

## 2022-09-17 PROCEDURE — 999N000141 HC STATISTIC PRE-PROCEDURE NURSING ASSESSMENT: Performed by: OBSTETRICS & GYNECOLOGY

## 2022-09-17 PROCEDURE — 87653 STREP B DNA AMP PROBE: CPT | Performed by: STUDENT IN AN ORGANIZED HEALTH CARE EDUCATION/TRAINING PROGRAM

## 2022-09-17 PROCEDURE — 85027 COMPLETE CBC AUTOMATED: CPT | Performed by: STUDENT IN AN ORGANIZED HEALTH CARE EDUCATION/TRAINING PROGRAM

## 2022-09-17 PROCEDURE — 84112 EVAL AMNIOTIC FLUID PROTEIN: CPT | Performed by: OBSTETRICS & GYNECOLOGY

## 2022-09-17 PROCEDURE — 120N000002 HC R&B MED SURG/OB UMMC

## 2022-09-17 PROCEDURE — 370N000017 HC ANESTHESIA TECHNICAL FEE, PER MIN: Performed by: OBSTETRICS & GYNECOLOGY

## 2022-09-17 PROCEDURE — 86901 BLOOD TYPING SEROLOGIC RH(D): CPT | Performed by: STUDENT IN AN ORGANIZED HEALTH CARE EDUCATION/TRAINING PROGRAM

## 2022-09-17 PROCEDURE — 86780 TREPONEMA PALLIDUM: CPT | Performed by: STUDENT IN AN ORGANIZED HEALTH CARE EDUCATION/TRAINING PROGRAM

## 2022-09-17 PROCEDURE — 86850 RBC ANTIBODY SCREEN: CPT | Performed by: STUDENT IN AN ORGANIZED HEALTH CARE EDUCATION/TRAINING PROGRAM

## 2022-09-17 RX ORDER — ONDANSETRON 4 MG/1
4 TABLET, ORALLY DISINTEGRATING ORAL EVERY 6 HOURS PRN
Status: DISCONTINUED | OUTPATIENT
Start: 2022-09-17 | End: 2022-09-20 | Stop reason: HOSPADM

## 2022-09-17 RX ORDER — FENTANYL CITRATE 50 UG/ML
INJECTION, SOLUTION INTRAMUSCULAR; INTRAVENOUS
Status: COMPLETED | OUTPATIENT
Start: 2022-09-17 | End: 2022-09-17

## 2022-09-17 RX ORDER — MISOPROSTOL 200 UG/1
800 TABLET ORAL
Status: DISCONTINUED | OUTPATIENT
Start: 2022-09-17 | End: 2022-09-20 | Stop reason: HOSPADM

## 2022-09-17 RX ORDER — KETOROLAC TROMETHAMINE 30 MG/ML
INJECTION, SOLUTION INTRAMUSCULAR; INTRAVENOUS PRN
Status: DISCONTINUED | OUTPATIENT
Start: 2022-09-17 | End: 2022-09-17

## 2022-09-17 RX ORDER — METOCLOPRAMIDE HYDROCHLORIDE 5 MG/ML
10 INJECTION INTRAMUSCULAR; INTRAVENOUS EVERY 6 HOURS PRN
Status: DISCONTINUED | OUTPATIENT
Start: 2022-09-17 | End: 2022-09-20 | Stop reason: HOSPADM

## 2022-09-17 RX ORDER — MISOPROSTOL 200 UG/1
400 TABLET ORAL
Status: DISCONTINUED | OUTPATIENT
Start: 2022-09-17 | End: 2022-09-20 | Stop reason: HOSPADM

## 2022-09-17 RX ORDER — AMOXICILLIN 250 MG
2 CAPSULE ORAL 2 TIMES DAILY
Status: DISCONTINUED | OUTPATIENT
Start: 2022-09-17 | End: 2022-09-20 | Stop reason: HOSPADM

## 2022-09-17 RX ORDER — AZITHROMYCIN 500 MG/5ML
500 INJECTION, POWDER, LYOPHILIZED, FOR SOLUTION INTRAVENOUS
Status: COMPLETED | OUTPATIENT
Start: 2022-09-17 | End: 2022-09-17

## 2022-09-17 RX ORDER — BUPIVACAINE HYDROCHLORIDE 2.5 MG/ML
INJECTION, SOLUTION EPIDURAL; INFILTRATION; INTRACAUDAL
Status: COMPLETED | OUTPATIENT
Start: 2022-09-17 | End: 2022-09-17

## 2022-09-17 RX ORDER — OXYTOCIN/0.9 % SODIUM CHLORIDE 30/500 ML
340 PLASTIC BAG, INJECTION (ML) INTRAVENOUS CONTINUOUS PRN
Status: DISCONTINUED | OUTPATIENT
Start: 2022-09-17 | End: 2022-09-20 | Stop reason: HOSPADM

## 2022-09-17 RX ORDER — METHYLERGONOVINE MALEATE 0.2 MG/ML
200 INJECTION INTRAVENOUS
Status: DISCONTINUED | OUTPATIENT
Start: 2022-09-17 | End: 2022-09-17 | Stop reason: HOSPADM

## 2022-09-17 RX ORDER — METOCLOPRAMIDE 10 MG/1
10 TABLET ORAL EVERY 6 HOURS PRN
Status: DISCONTINUED | OUTPATIENT
Start: 2022-09-17 | End: 2022-09-20 | Stop reason: HOSPADM

## 2022-09-17 RX ORDER — AMOXICILLIN 250 MG
1 CAPSULE ORAL 2 TIMES DAILY
Status: DISCONTINUED | OUTPATIENT
Start: 2022-09-17 | End: 2022-09-20 | Stop reason: HOSPADM

## 2022-09-17 RX ORDER — SODIUM CHLORIDE, SODIUM LACTATE, POTASSIUM CHLORIDE, CALCIUM CHLORIDE 600; 310; 30; 20 MG/100ML; MG/100ML; MG/100ML; MG/100ML
INJECTION, SOLUTION INTRAVENOUS CONTINUOUS
Status: DISCONTINUED | OUTPATIENT
Start: 2022-09-17 | End: 2022-09-17 | Stop reason: HOSPADM

## 2022-09-17 RX ORDER — PROCHLORPERAZINE MALEATE 10 MG
10 TABLET ORAL EVERY 6 HOURS PRN
Status: DISCONTINUED | OUTPATIENT
Start: 2022-09-17 | End: 2022-09-20 | Stop reason: HOSPADM

## 2022-09-17 RX ORDER — LIDOCAINE 40 MG/G
CREAM TOPICAL
Status: DISCONTINUED | OUTPATIENT
Start: 2022-09-17 | End: 2022-09-17 | Stop reason: HOSPADM

## 2022-09-17 RX ORDER — OXYTOCIN/0.9 % SODIUM CHLORIDE 30/500 ML
100-340 PLASTIC BAG, INJECTION (ML) INTRAVENOUS CONTINUOUS PRN
Status: DISCONTINUED | OUTPATIENT
Start: 2022-09-17 | End: 2022-09-20

## 2022-09-17 RX ORDER — SIMETHICONE 80 MG
80 TABLET,CHEWABLE ORAL 4 TIMES DAILY PRN
Status: DISCONTINUED | OUTPATIENT
Start: 2022-09-17 | End: 2022-09-20 | Stop reason: HOSPADM

## 2022-09-17 RX ORDER — OXYCODONE HYDROCHLORIDE 5 MG/1
5 TABLET ORAL EVERY 4 HOURS PRN
Status: DISCONTINUED | OUTPATIENT
Start: 2022-09-17 | End: 2022-09-20 | Stop reason: HOSPADM

## 2022-09-17 RX ORDER — OXYTOCIN 10 [USP'U]/ML
10 INJECTION, SOLUTION INTRAMUSCULAR; INTRAVENOUS
Status: DISCONTINUED | OUTPATIENT
Start: 2022-09-17 | End: 2022-09-20

## 2022-09-17 RX ORDER — ACETAMINOPHEN 325 MG/1
975 TABLET ORAL ONCE
Status: COMPLETED | OUTPATIENT
Start: 2022-09-17 | End: 2022-09-17

## 2022-09-17 RX ORDER — SODIUM CHLORIDE, SODIUM LACTATE, POTASSIUM CHLORIDE, CALCIUM CHLORIDE 600; 310; 30; 20 MG/100ML; MG/100ML; MG/100ML; MG/100ML
INJECTION, SOLUTION INTRAVENOUS CONTINUOUS PRN
Status: DISCONTINUED | OUTPATIENT
Start: 2022-09-17 | End: 2022-09-17

## 2022-09-17 RX ORDER — PROCHLORPERAZINE 25 MG
25 SUPPOSITORY, RECTAL RECTAL EVERY 12 HOURS PRN
Status: DISCONTINUED | OUTPATIENT
Start: 2022-09-17 | End: 2022-09-20 | Stop reason: HOSPADM

## 2022-09-17 RX ORDER — SODIUM CHLORIDE, SODIUM LACTATE, POTASSIUM CHLORIDE, CALCIUM CHLORIDE 600; 310; 30; 20 MG/100ML; MG/100ML; MG/100ML; MG/100ML
INJECTION, SOLUTION INTRAVENOUS
Status: COMPLETED
Start: 2022-09-17 | End: 2022-09-17

## 2022-09-17 RX ORDER — MISOPROSTOL 200 UG/1
400 TABLET ORAL
Status: DISCONTINUED | OUTPATIENT
Start: 2022-09-17 | End: 2022-09-17 | Stop reason: HOSPADM

## 2022-09-17 RX ORDER — HYDROCORTISONE 25 MG/G
CREAM TOPICAL 3 TIMES DAILY PRN
Status: DISCONTINUED | OUTPATIENT
Start: 2022-09-17 | End: 2022-09-20 | Stop reason: HOSPADM

## 2022-09-17 RX ORDER — CITRIC ACID/SODIUM CITRATE 334-500MG
SOLUTION, ORAL ORAL
Status: COMPLETED
Start: 2022-09-17 | End: 2022-09-17

## 2022-09-17 RX ORDER — ONDANSETRON 2 MG/ML
4 INJECTION INTRAMUSCULAR; INTRAVENOUS EVERY 6 HOURS PRN
Status: DISCONTINUED | OUTPATIENT
Start: 2022-09-17 | End: 2022-09-20 | Stop reason: HOSPADM

## 2022-09-17 RX ORDER — BISACODYL 10 MG
10 SUPPOSITORY, RECTAL RECTAL DAILY PRN
Status: DISCONTINUED | OUTPATIENT
Start: 2022-09-19 | End: 2022-09-20 | Stop reason: HOSPADM

## 2022-09-17 RX ORDER — LIDOCAINE 40 MG/G
CREAM TOPICAL
Status: DISCONTINUED | OUTPATIENT
Start: 2022-09-17 | End: 2022-09-17

## 2022-09-17 RX ORDER — ONDANSETRON 4 MG/1
4 TABLET, ORALLY DISINTEGRATING ORAL EVERY 30 MIN PRN
Status: DISCONTINUED | OUTPATIENT
Start: 2022-09-17 | End: 2022-09-17 | Stop reason: HOSPADM

## 2022-09-17 RX ORDER — OXYTOCIN/0.9 % SODIUM CHLORIDE 30/500 ML
340 PLASTIC BAG, INJECTION (ML) INTRAVENOUS CONTINUOUS PRN
Status: DISCONTINUED | OUTPATIENT
Start: 2022-09-17 | End: 2022-09-17 | Stop reason: HOSPADM

## 2022-09-17 RX ORDER — LIDOCAINE 40 MG/G
CREAM TOPICAL
Status: DISCONTINUED | OUTPATIENT
Start: 2022-09-17 | End: 2022-09-18

## 2022-09-17 RX ORDER — NALOXONE HYDROCHLORIDE 0.4 MG/ML
0.2 INJECTION, SOLUTION INTRAMUSCULAR; INTRAVENOUS; SUBCUTANEOUS
Status: DISCONTINUED | OUTPATIENT
Start: 2022-09-17 | End: 2022-09-20 | Stop reason: HOSPADM

## 2022-09-17 RX ORDER — OXYTOCIN/0.9 % SODIUM CHLORIDE 30/500 ML
PLASTIC BAG, INJECTION (ML) INTRAVENOUS CONTINUOUS PRN
Status: DISCONTINUED | OUTPATIENT
Start: 2022-09-17 | End: 2022-09-17

## 2022-09-17 RX ORDER — BUPIVACAINE HYDROCHLORIDE 7.5 MG/ML
INJECTION, SOLUTION INTRASPINAL
Status: COMPLETED | OUTPATIENT
Start: 2022-09-17 | End: 2022-09-17

## 2022-09-17 RX ORDER — NALOXONE HYDROCHLORIDE 0.4 MG/ML
0.4 INJECTION, SOLUTION INTRAMUSCULAR; INTRAVENOUS; SUBCUTANEOUS
Status: DISCONTINUED | OUTPATIENT
Start: 2022-09-17 | End: 2022-09-20 | Stop reason: HOSPADM

## 2022-09-17 RX ORDER — TRANEXAMIC ACID 10 MG/ML
1 INJECTION, SOLUTION INTRAVENOUS EVERY 30 MIN PRN
Status: DISCONTINUED | OUTPATIENT
Start: 2022-09-17 | End: 2022-09-20 | Stop reason: HOSPADM

## 2022-09-17 RX ORDER — ONDANSETRON 2 MG/ML
4 INJECTION INTRAMUSCULAR; INTRAVENOUS EVERY 30 MIN PRN
Status: DISCONTINUED | OUTPATIENT
Start: 2022-09-17 | End: 2022-09-17 | Stop reason: HOSPADM

## 2022-09-17 RX ORDER — ACETAMINOPHEN 325 MG/1
975 TABLET ORAL EVERY 6 HOURS
Status: DISCONTINUED | OUTPATIENT
Start: 2022-09-18 | End: 2022-09-20 | Stop reason: HOSPADM

## 2022-09-17 RX ORDER — CEFAZOLIN SODIUM/WATER 2 G/20 ML
2 SYRINGE (ML) INTRAVENOUS
Status: COMPLETED | OUTPATIENT
Start: 2022-09-17 | End: 2022-09-17

## 2022-09-17 RX ORDER — OXYTOCIN 10 [USP'U]/ML
10 INJECTION, SOLUTION INTRAMUSCULAR; INTRAVENOUS
Status: DISCONTINUED | OUTPATIENT
Start: 2022-09-17 | End: 2022-09-20 | Stop reason: HOSPADM

## 2022-09-17 RX ORDER — KETOROLAC TROMETHAMINE 30 MG/ML
30 INJECTION, SOLUTION INTRAMUSCULAR; INTRAVENOUS EVERY 6 HOURS
Status: COMPLETED | OUTPATIENT
Start: 2022-09-18 | End: 2022-09-18

## 2022-09-17 RX ORDER — MORPHINE SULFATE 1 MG/ML
INJECTION, SOLUTION EPIDURAL; INTRATHECAL; INTRAVENOUS
Status: COMPLETED | OUTPATIENT
Start: 2022-09-17 | End: 2022-09-17

## 2022-09-17 RX ORDER — DEXTROSE, SODIUM CHLORIDE, SODIUM LACTATE, POTASSIUM CHLORIDE, AND CALCIUM CHLORIDE 5; .6; .31; .03; .02 G/100ML; G/100ML; G/100ML; G/100ML; G/100ML
INJECTION, SOLUTION INTRAVENOUS CONTINUOUS
Status: DISCONTINUED | OUTPATIENT
Start: 2022-09-17 | End: 2022-09-20 | Stop reason: HOSPADM

## 2022-09-17 RX ORDER — MISOPROSTOL 200 UG/1
800 TABLET ORAL
Status: DISCONTINUED | OUTPATIENT
Start: 2022-09-17 | End: 2022-09-17 | Stop reason: HOSPADM

## 2022-09-17 RX ORDER — NALBUPHINE HYDROCHLORIDE 10 MG/ML
10 INJECTION, SOLUTION INTRAMUSCULAR; INTRAVENOUS; SUBCUTANEOUS
Status: COMPLETED | OUTPATIENT
Start: 2022-09-17 | End: 2022-09-17

## 2022-09-17 RX ORDER — TRANEXAMIC ACID 10 MG/ML
1 INJECTION, SOLUTION INTRAVENOUS EVERY 30 MIN PRN
Status: DISCONTINUED | OUTPATIENT
Start: 2022-09-17 | End: 2022-09-17 | Stop reason: HOSPADM

## 2022-09-17 RX ORDER — OXYTOCIN 10 [USP'U]/ML
10 INJECTION, SOLUTION INTRAMUSCULAR; INTRAVENOUS
Status: DISCONTINUED | OUTPATIENT
Start: 2022-09-17 | End: 2022-09-17 | Stop reason: HOSPADM

## 2022-09-17 RX ORDER — DIPHENHYDRAMINE HYDROCHLORIDE 50 MG/ML
25 INJECTION INTRAMUSCULAR; INTRAVENOUS EVERY 6 HOURS PRN
Status: DISCONTINUED | OUTPATIENT
Start: 2022-09-17 | End: 2022-09-20 | Stop reason: HOSPADM

## 2022-09-17 RX ORDER — METHYLERGONOVINE MALEATE 0.2 MG/ML
200 INJECTION INTRAVENOUS
Status: DISCONTINUED | OUTPATIENT
Start: 2022-09-17 | End: 2022-09-20 | Stop reason: HOSPADM

## 2022-09-17 RX ORDER — ONDANSETRON 2 MG/ML
INJECTION INTRAMUSCULAR; INTRAVENOUS PRN
Status: DISCONTINUED | OUTPATIENT
Start: 2022-09-17 | End: 2022-09-17

## 2022-09-17 RX ORDER — CITRIC ACID/SODIUM CITRATE 334-500MG
30 SOLUTION, ORAL ORAL
Status: COMPLETED | OUTPATIENT
Start: 2022-09-17 | End: 2022-09-17

## 2022-09-17 RX ORDER — DIPHENHYDRAMINE HCL 25 MG
25 CAPSULE ORAL EVERY 6 HOURS PRN
Status: DISCONTINUED | OUTPATIENT
Start: 2022-09-17 | End: 2022-09-20 | Stop reason: HOSPADM

## 2022-09-17 RX ORDER — CEFAZOLIN SODIUM/WATER 2 G/20 ML
2 SYRINGE (ML) INTRAVENOUS SEE ADMIN INSTRUCTIONS
Status: DISCONTINUED | OUTPATIENT
Start: 2022-09-17 | End: 2022-09-17 | Stop reason: HOSPADM

## 2022-09-17 RX ORDER — IBUPROFEN 800 MG/1
800 TABLET, FILM COATED ORAL EVERY 6 HOURS
Status: DISCONTINUED | OUTPATIENT
Start: 2022-09-18 | End: 2022-09-20 | Stop reason: HOSPADM

## 2022-09-17 RX ORDER — MODIFIED LANOLIN
OINTMENT (GRAM) TOPICAL
Status: DISCONTINUED | OUTPATIENT
Start: 2022-09-17 | End: 2022-09-20 | Stop reason: HOSPADM

## 2022-09-17 RX ADMIN — SODIUM CHLORIDE, POTASSIUM CHLORIDE, SODIUM LACTATE AND CALCIUM CHLORIDE 1000 ML: 600; 310; 30; 20 INJECTION, SOLUTION INTRAVENOUS at 18:15

## 2022-09-17 RX ADMIN — OXYTOCIN-SODIUM CHLORIDE 0.9% IV SOLN 30 UNIT/500ML 300 ML/HR: 30-0.9/5 SOLUTION at 19:16

## 2022-09-17 RX ADMIN — FENTANYL CITRATE 25 MCG: 50 INJECTION, SOLUTION INTRAMUSCULAR; INTRAVENOUS at 19:00

## 2022-09-17 RX ADMIN — BUPIVACAINE HYDROCHLORIDE IN DEXTROSE 1.6 ML: 7.5 INJECTION, SOLUTION SUBARACHNOID at 19:00

## 2022-09-17 RX ADMIN — Medication 30 ML: at 18:38

## 2022-09-17 RX ADMIN — ACETAMINOPHEN 975 MG: 325 TABLET ORAL at 18:22

## 2022-09-17 RX ADMIN — PHENYLEPHRINE HYDROCHLORIDE 100 MCG/MIN: 10 INJECTION INTRAVENOUS at 19:00

## 2022-09-17 RX ADMIN — ACETAMINOPHEN 975 MG: 325 TABLET, FILM COATED ORAL at 23:33

## 2022-09-17 RX ADMIN — SODIUM CHLORIDE, POTASSIUM CHLORIDE, SODIUM LACTATE AND CALCIUM CHLORIDE: 600; 310; 30; 20 INJECTION, SOLUTION INTRAVENOUS at 19:46

## 2022-09-17 RX ADMIN — KETOROLAC TROMETHAMINE 30 MG: 30 INJECTION, SOLUTION INTRAMUSCULAR at 19:47

## 2022-09-17 RX ADMIN — AZITHROMYCIN MONOHYDRATE 500 MG: 500 INJECTION, POWDER, LYOPHILIZED, FOR SOLUTION INTRAVENOUS at 18:21

## 2022-09-17 RX ADMIN — BUPIVACAINE HYDROCHLORIDE 40 ML: 2.5 INJECTION, SOLUTION EPIDURAL; INFILTRATION; INTRACAUDAL; PERINEURAL at 20:00

## 2022-09-17 RX ADMIN — DIPHENHYDRAMINE HYDROCHLORIDE 25 MG: 50 INJECTION, SOLUTION INTRAMUSCULAR; INTRAVENOUS at 21:16

## 2022-09-17 RX ADMIN — MORPHINE SULFATE 0.2 MG: 1 INJECTION EPIDURAL; INTRATHECAL; INTRAVENOUS at 19:00

## 2022-09-17 RX ADMIN — ONDANSETRON 4 MG: 2 INJECTION INTRAMUSCULAR; INTRAVENOUS at 19:18

## 2022-09-17 RX ADMIN — SODIUM CITRATE AND CITRIC ACID MONOHYDRATE 30 ML: 500; 334 SOLUTION ORAL at 18:38

## 2022-09-17 RX ADMIN — Medication 2 G: at 19:04

## 2022-09-17 RX ADMIN — NALBUPHINE HYDROCHLORIDE 10 MG: 10 INJECTION, SOLUTION INTRAMUSCULAR; INTRAVENOUS; SUBCUTANEOUS at 23:26

## 2022-09-17 RX ADMIN — BUPIVACAINE 20 ML: 13.3 INJECTION, SUSPENSION, LIPOSOMAL INFILTRATION at 20:00

## 2022-09-17 RX ADMIN — SODIUM CHLORIDE, POTASSIUM CHLORIDE, SODIUM LACTATE AND CALCIUM CHLORIDE: 600; 310; 30; 20 INJECTION, SOLUTION INTRAVENOUS at 18:53

## 2022-09-17 ASSESSMENT — ACTIVITIES OF DAILY LIVING (ADL)
ADLS_ACUITY_SCORE: 18

## 2022-09-17 ASSESSMENT — ENCOUNTER SYMPTOMS: SEIZURES: 1

## 2022-09-17 NOTE — H&P
History and Physical     Myrtle Strickland MRN# [id number]   YOB: 1995 Age: 27 year old      Date of Admission: 2022    Primary care provider: No Ref-Primary, Physician      Assessment and Plan:       27 year old  at 34w0d by LMP c/w 7w1d US here with SROM at 1650 and painful contractions. Pregnancy is notable for h/o PTD at 34w4d, family h/o OTC deficiency, non-epileptic seizure disorder, anxiety, depression, ADHD, bipolar disorder, and autism spectrum disorder. Fetus was found to be in breech presentation on BSUS. Patient counseled that recommendation would be to proceed with CS delivery, she is agreeable to this.     # SROM,  labor   # Breech presentation  -  section recommended. Reviewed procedure details, benefits, and risks including bleeding, infection, injury to other tissues/organs, need for blood transfusion, and remote risk of need for hysterectomy. Questions answered. Consent signed.  - Anesthesia consulted  - Ancef 2 g, azithromycin 500 mg for infection ppx  - PPH: remote h/o asthma, avoid hemabate  - T&C x2 units due to Ab positive T&S  - To OR in urgent setting    # Non-epileptic seizure disorder  # H/o SARAH/MDD, ADHD, BPD, ASD  - Continue PTA sertraline 50 mg every day     # PNC  - Rh neg, Ab pos, Rubella immune, GCT wnl, GBS unknown (pending)  - Other prenatal labs wnl  - Imaging: placenta anterior/fundal, last growth  EFW 31%, AC 46%     # FWB:   Cat I tracing, reactive; Breech by BSUS; EFW 5# by Leopold's  - Continuous Fetal Monitoring  - Intrauterine resuscitative measures prn  - NICU at delivery given family h/o OTC deficiency       Patient discussed with Dr. Pascual.       Esther Del Cid MD  OB/GYN Resident PGY-3  6:52 PM 2022      I have seen and examined the patient separately from the resident. I have reviewed, edited, and agree with the note.     Gina Pascual MD            HPI:     Myrtle Strickland is a 27 year old  at 34w0d  by LMP c/w 7w1d US who presents today for SROM at 1650 and worsening contractions. Has had  contractions throughout pregnancy, s/p admission 22-22 for rule out  labor and received a course of BMZ. She reports since SROM contractions have been getting more intense. No VB. Good FM.      Her previous pregnancies were notable for  delivery at 34w4d. Her delivery was uncomplicated. Denies history of postpartum hemorrhage, shoulder dystocia, pre-eclampsia. No history of high blood pressure. Does have a remote h/o asthma.     Pregnancy notable for:   - History of  delivery at 34w4d  - Family history of OTC deficiency  - Anxiety, depression, ADHD, bipolar disorder, and autism spectrum disorder  - Non epileptic seizure disorder  - Hx eating disorder  - Rubella, varicella NI    OB History:    OB History    Para Term  AB Living   4 1 0 1 2 1   SAB IAB Ectopic Multiple Live Births   2 0 0 0 1      # Outcome Date GA Lbr Karl/2nd Weight Sex Delivery Anes PTL Lv   4 Current            3  19 34w5d  2.34 kg (5 lb 2.5 oz) F Vag-Spont   CLARISSA      Name: Papo      Apgar1: 8  Apgar5: 9   2 SAB 13 6w0d          1 2010 4w0d               Prenatal Lab Results:  Lab Results   Component Value Date    AS Positive (A) 2022    HGB 11.3 (L) 2022       GBS Status:   No results found for: GBS             Past Medical History:     Past Medical History:   Diagnosis Date     ADHD (attention deficit hyperactivity disorder)      Anxiety      Depressive disorder      History of eating disorder      Psychogenic nonepileptic seizure      Rubella non-immune status, antepartum      Uncomplicated asthma              Past Surgical History:     Past Surgical History:   Procedure Laterality Date     AS REMOVAL OF TONSILS,<11 Y/O               Social History:     Social History     Tobacco Use     Smoking status: Passive Smoke Exposure - Never Smoker     Smokeless  tobacco: Former User   Substance Use Topics     Alcohol use: Not Currently             Family History:   History reviewed. No pertinent family history.          Immunizations:     Immunization History   Administered Date(s) Administered     DTAP (<7y) 09/24/1996, 03/23/1999     DTP-Hib 1995, 1995, 1995     FLU 6-35 months 10/18/2005, 10/19/2006, 10/08/2008, 09/04/2009, 11/19/2012     Flu, Unspecified 10/18/2005, 10/19/2006, 10/08/2008, 09/04/2009     HPV Quadrivalent 06/11/2008, 09/05/2008, 12/08/2008     Hep B, Peds or Adolescent 1995, 1995, 1995     HepA-ped 2 Dose 08/16/2007, 06/11/2008     HepB-Adult 1995, 1995, 1995     Hib (PRP-T) 1995, 1995, 1995, 09/24/1996     Historical DTP/aP 1995, 1995, 1995     Influenza Vaccine IM > 6 months Valent IIV4 (Alfuria,Fluzone) 10/18/2005, 10/19/2006, 10/08/2008, 09/04/2009, 11/19/2012     MMR 06/20/1996, 03/23/1999     Meningococcal (Menactra ) 08/16/2007     Meningococcal (Menomune ) 08/16/2007     OPV, trivalent, live 1995, 1995, 06/20/1996, 03/23/1999     OPV, unspecified 1995, 1995, 06/20/1996, 03/23/1999     Rhogam 06/18/2013     TRIHIBIT (DTAP/HIB, <7y) 09/24/1996, 03/23/1999     Tdap (Adacel,Boostrix) 06/05/2006, 05/14/2019     Varicella 04/07/1998, 08/16/2007            Allergies:     Allergies   Allergen Reactions     Guaifenesin Hives and Nausea and Vomiting     Adhesive Tape Other (See Comments)     Burns only with [facility]proof bandages. [facility] T[facility] is okay.  Burns only with waterproof bandages. [facility] Tegaderm and tape is okay.  Burns only with waterproof bandages. [facility] Tegaderm and tape is okay.  Burns only with waterproof bandages. [facility] Tegaderm and tape is okay.       Mushroom      Latex Hives, Itching and Rash             Medications:     Medications Prior to Admission   Medication Sig Dispense  Refill Last Dose     hydrOXYzine (ATARAX) 25 MG tablet Take 1-2 tablets (25-50 mg) by mouth nightly as needed for itching 30 tablet 0      metoclopramide (REGLAN) 10 MG tablet  (Patient not taking: Reported on 9/8/2022)        ondansetron (ZOFRAN) 4 MG tablet ondansetron HCl 4 mg tablet   Take 1 tablet by mouth every 6-8 hours as directed.        Prenatal MV & Min w/FA-DHA (PRENATAL ADULT GUMMY/DHA/FA PO) Prenatal Gummies        promethazine (PHENERGAN) 25 MG tablet promethazine 25 mg tablet   Take 1 tablet by mouth every 6-8 hours by oral route.        sertraline (ZOLOFT) 50 MG tablet Take 1.5 tablets (75 mg) by mouth daily 60 tablet 1      sertraline (ZOLOFT) 50 MG tablet                 Review of Systems & Physical Exam:     The Review of Systems is negative other than noted in the HPI      LMP 01/22/2022   Gen: Uncomfortable with contractions.   CV: Warm and well perfused   Lungs: Breathing comfortably on room air   Abd: Gravid, non-tender, non-distended  Ext: Trace peripheral extremity edema    SSE: Some bluish appearing lesions on vulva bilaterally, non-tender. Otherwise normal appearing external genitalia. Grossly ruptured with pooling, cervix parous, visibly 2 cm dilated.  Cervix: 3.5/50/-3   Membranes: s/o SROM  Presentation: Breech by BSUS.  Estimated Fetal Weight: 5# by Leopold's    FHT:  Monitoring External  FHT: Baseline 135 bpm; moderate variability; accels present; no decelerations  TOCO 3-4 contractions in 10 minutes         Data:      Latest Reference Range & Units 09/17/22 18:23   WBC 4.0 - 11.0 10e3/uL 7.8   Hemoglobin 11.7 - 15.7 g/dL 11.1 (L)   Hematocrit 35.0 - 47.0 % 33.0 (L)   Platelet Count 150 - 450 10e3/uL 275   (L): Data is abnormally low    Amnisure positive

## 2022-09-17 NOTE — ANESTHESIA PREPROCEDURE EVALUATION
Anesthesia Pre-Procedure Evaluation    Patient: Myrtle Strickland   MRN: [id number] : 1995        Procedure : Procedure(s):   SECTION          Past Medical History:   Diagnosis Date     ADHD (attention deficit hyperactivity disorder)      Anxiety      Depressive disorder      History of eating disorder      Psychogenic nonepileptic seizure      Rubella non-immune status, antepartum      Uncomplicated asthma       Past Surgical History:   Procedure Laterality Date     AS REMOVAL OF TONSILS,<13 Y/O        Allergies   Allergen Reactions     Guaifenesin Hives and Nausea and Vomiting     Adhesive Tape Other (See Comments)     Burns only with waterproof bandages. [facility] Tegaderm and tape is okay.  Burns only with waterproof bandages. [facility] Tegaderm and tape is okay.  Burns only with waterproof bandages. [facility] Tegaderm and tape is okay.  Burns only with waterproof bandages. [facility] Tegaderm and tape is okay.       Mushroom      Latex Hives, Itching and Rash      Social History     Tobacco Use     Smoking status: Passive Smoke Exposure - Never Smoker     Smokeless tobacco: Former User   Substance Use Topics     Alcohol use: Not Currently      Wt Readings from Last 1 Encounters:   22 70.3 kg (155 lb)        Anesthesia Evaluation   Pt has had prior anesthetic. Type: Regional and General.        ROS/MED HX  ENT/Pulmonary:     (+) Intermittent, asthma Last exacerbation: 10 yrs ago,      Neurologic:     (+) seizures, features: psychogenic,     Cardiovascular:       METS/Exercise Tolerance: >4 METS    Hematologic: Comments: + AB screening -discussed with OB team    (+) anemia,     Musculoskeletal:  - neg musculoskeletal ROS     GI/Hepatic:     (+) GERD, Symptomatic,     Renal/Genitourinary:  - neg Renal ROS     Endo:  - neg endo ROS     Psychiatric/Substance Use:     (+) psychiatric history anxiety, depression and bipolar (Asperger's)     Infectious Disease:  - neg infectious  disease ROS     Malignancy:  - neg malignancy ROS     Other:            Physical Exam    Airway        Mallampati: II   TM distance: > 3 FB   Neck ROM: full   Mouth opening: > 3 cm    Respiratory Devices and Support         Dental     Comment: Extensive enamel decay    (+) chipped      Cardiovascular   cardiovascular exam normal          Pulmonary   pulmonary exam normal                OUTSIDE LABS:  CBC:   Lab Results   Component Value Date    WBC 7.8 09/17/2022    WBC 7.4 09/09/2022    HGB 11.1 (L) 09/17/2022    HGB 11.3 (L) 09/09/2022    HCT 33.0 (L) 09/17/2022    HCT 34.3 (L) 09/09/2022     09/17/2022     09/09/2022     BMP:   Lab Results   Component Value Date     09/09/2022     08/24/2022    POTASSIUM 3.3 (L) 09/09/2022    POTASSIUM 4.4 08/24/2022    CHLORIDE 111 (H) 09/09/2022    CHLORIDE 108 08/24/2022    CO2 23 09/09/2022    CO2 22 08/24/2022    BUN 8 09/09/2022    BUN 5 (L) 08/24/2022    CR 0.66 09/09/2022    CR 0.64 08/24/2022     (H) 09/09/2022     (H) 08/24/2022     COAGS: No results found for: PTT, INR, FIBR  POC: No results found for: BGM, HCG, HCGS  HEPATIC:   Lab Results   Component Value Date    ALBUMIN 2.6 (L) 07/20/2022    PROTTOTAL 6.4 (L) 07/20/2022    ALT 12 07/20/2022    AST 12 07/20/2022    ALKPHOS 108 07/20/2022    BILITOTAL 0.3 07/20/2022    SHARRON 20 09/09/2022     OTHER:   Lab Results   Component Value Date    CONSUELO 8.6 09/09/2022       Anesthesia Plan    ASA Status:  3, emergent    NPO Status:  ELEVATED Aspiration Risk/Unknown    Anesthesia Type: Spinal.   Induction: N/a.   Maintenance: N/A.   Techniques and Equipment:       - Blood: T&S, T&C     Consents    Anesthesia Plan(s) and associated risks, benefits, and realistic alternatives discussed. Questions answered and patient/representative(s) expressed understanding.    - Discussed:     - Discussed with:  Patient      - Extended Intubation/Ventilatory Support Discussed: No.      - Patient is DNR/DNI  Status: No    Use of blood products discussed: Yes.     - Discussed with: Patient.     Postoperative Care    Pain management: Peripheral nerve block (Single Shot), IV analgesics, Oral pain medications, Multi-modal analgesia, intrathecal morphine.   PONV prophylaxis: Ondansetron (or other 5HT-3)     Comments:    Other Comments: Patient presenting for emergency  2/2 pre-term labor with rupture of membranes today; fetus in breech position. + full meal 4 hrs ago, h/o + Antibody in T&S. Discussed with OB team and it is decided to proceed as she is having significant uterine activity and there is risk for umbilical cord prolapse; case still feasible to be done under SAB. Anesthetic plan for SAB and back up GETA, including most common risk discussed with the patient and partner, all questions answered. Patient consented for TAP block at end of the case.            Renée Cruz MD

## 2022-09-18 LAB
GP B STREP DNA SPEC QL NAA+PROBE: NEGATIVE
HGB BLD-MCNC: 10.7 G/DL (ref 11.7–15.7)
T PALLIDUM AB SER QL: NONREACTIVE

## 2022-09-18 PROCEDURE — 85018 HEMOGLOBIN: CPT | Performed by: STUDENT IN AN ORGANIZED HEALTH CARE EDUCATION/TRAINING PROGRAM

## 2022-09-18 PROCEDURE — 120N000002 HC R&B MED SURG/OB UMMC

## 2022-09-18 PROCEDURE — 250N000011 HC RX IP 250 OP 636: Performed by: STUDENT IN AN ORGANIZED HEALTH CARE EDUCATION/TRAINING PROGRAM

## 2022-09-18 PROCEDURE — 36415 COLL VENOUS BLD VENIPUNCTURE: CPT | Performed by: STUDENT IN AN ORGANIZED HEALTH CARE EDUCATION/TRAINING PROGRAM

## 2022-09-18 PROCEDURE — 250N000013 HC RX MED GY IP 250 OP 250 PS 637: Performed by: STUDENT IN AN ORGANIZED HEALTH CARE EDUCATION/TRAINING PROGRAM

## 2022-09-18 RX ORDER — NALBUPHINE HYDROCHLORIDE 10 MG/ML
10 INJECTION, SOLUTION INTRAMUSCULAR; INTRAVENOUS; SUBCUTANEOUS ONCE
Status: DISCONTINUED | OUTPATIENT
Start: 2022-09-18 | End: 2022-09-20 | Stop reason: HOSPADM

## 2022-09-18 RX ADMIN — KETOROLAC TROMETHAMINE 30 MG: 30 INJECTION, SOLUTION INTRAMUSCULAR at 02:17

## 2022-09-18 RX ADMIN — IBUPROFEN 800 MG: 800 TABLET, FILM COATED ORAL at 23:17

## 2022-09-18 RX ADMIN — KETOROLAC TROMETHAMINE 30 MG: 30 INJECTION, SOLUTION INTRAMUSCULAR at 15:56

## 2022-09-18 RX ADMIN — SENNOSIDES AND DOCUSATE SODIUM 2 TABLET: 50; 8.6 TABLET ORAL at 08:56

## 2022-09-18 RX ADMIN — ACETAMINOPHEN 975 MG: 325 TABLET, FILM COATED ORAL at 05:40

## 2022-09-18 RX ADMIN — SENNOSIDES AND DOCUSATE SODIUM 2 TABLET: 50; 8.6 TABLET ORAL at 20:49

## 2022-09-18 RX ADMIN — SERTRALINE HYDROCHLORIDE 50 MG: 50 TABLET, FILM COATED ORAL at 08:55

## 2022-09-18 RX ADMIN — KETOROLAC TROMETHAMINE 30 MG: 30 INJECTION, SOLUTION INTRAMUSCULAR at 08:56

## 2022-09-18 RX ADMIN — ACETAMINOPHEN 975 MG: 325 TABLET, FILM COATED ORAL at 15:56

## 2022-09-18 RX ADMIN — SIMETHICONE 80 MG: 80 TABLET, CHEWABLE ORAL at 08:56

## 2022-09-18 RX ADMIN — SIMETHICONE 80 MG: 80 TABLET, CHEWABLE ORAL at 16:01

## 2022-09-18 RX ADMIN — HUMAN RHO(D) IMMUNE GLOBULIN 300 MCG: 1500 SOLUTION INTRAMUSCULAR; INTRAVENOUS at 20:49

## 2022-09-18 RX ADMIN — ACETAMINOPHEN 975 MG: 325 TABLET, FILM COATED ORAL at 23:17

## 2022-09-18 ASSESSMENT — ACTIVITIES OF DAILY LIVING (ADL)
ADLS_ACUITY_SCORE: 18

## 2022-09-18 NOTE — PROGRESS NOTES
Data: Myrtle Strickland transferred to [id number] via cart at 2205. Visited baby in NICU on the way.  Action: Receiving unit notified of transfer: Yes. Patient and family notified of room change. Report given to SHAMA Mojica at bedside. Belongings sent to receiving unit. Accompanied by Registered Nurse. Oriented patient to surroundings. Call light within reach.  Response: Patient tolerated transfer and is stable.

## 2022-09-18 NOTE — PLAN OF CARE
Patient arrived to [facility] unit via zoom cart at 2230,with belongings, accompanied by spouse/ significant other, with infant in NICU. Received report from [place] RN. Unit and room orientation completd. Call light within arms reach; no concerns present at this time. Continue with plan of care.

## 2022-09-18 NOTE — ANESTHESIA POSTPROCEDURE EVALUATION
Patient: Myrtle Strickland    Procedure: Procedure(s):   SECTION       Anesthesia Type:  No value filed.    Note:  Disposition: Inpatient   Postop Pain Control: Uneventful            Sign Out: Well controlled pain   PONV: No   Neuro/Psych: Uneventful            Sign Out: Acceptable/Baseline neuro status   Airway/Respiratory: Uneventful            Sign Out: Acceptable/Baseline resp. status   CV/Hemodynamics: Uneventful            Sign Out: Acceptable CV status; No obvious hypovolemia; No obvious fluid overload   Other NRE: NONE   DID A NON-ROUTINE EVENT OCCUR? No           Last vitals:  Vitals Value Taken Time   BP 98/63 22 2145   Temp     Pulse 52 22 2159   Resp 18 22 2100   SpO2 99 % 22   Vitals shown include unvalidated device data.    Electronically Signed By: Vishnu Roe MD  2022  10:35 PM

## 2022-09-18 NOTE — PLAN OF CARE
Goal Outcome Evaluation:      [id number]  Unable to void cathed twice since richardson came out at 0300, indwelling cath placed at 1545. Patient denies pain and is being treated with the current pain regimen. Up Ad libs in the room. C/o Itching, medication offered and denied. Pumping for the baby and visiting on the 11th floor. Tolerating liquid. Will stick to the current treatment plan.

## 2022-09-18 NOTE — ANESTHESIA PROCEDURE NOTES
TAP Procedure Note    Pre-Procedure   Staff -        Anesthesiologist:  Rick Brunner MD       Resident/Fellow: Cameron Sanchez MD       Performed By: resident       Location: OR       Pre-Anesthestic Checklist: patient identified, IV checked, site marked, risks and benefits discussed, informed consent, monitors and equipment checked, pre-op evaluation, at physician/surgeon's request and post-op pain management  Timeout:       Correct Patient: Yes        Correct Procedure: Yes        Correct Site: Yes        Correct Position: Yes        Correct Laterality: Yes        Site Marked: Yes  Procedure Documentation  Procedure: TAP       Laterality: bilateral       Patient Position: supine       Patient Prep/Sterile Barriers: sterile gloves, mask       Skin prep: Chloraprep       Needle Type: short bevel       Needle Gauge: 21.        Needle Length (millimeters): 110        Ultrasound guided       1. Ultrasound was used to identify targeted nerve, plexus, vascular marker, or fascial plane and place a needle adjacent to it in real-time.       2. Ultrasound was used to visualize the spread of anesthetic in close proximity to the above referenced structure.       3. A permanent image is entered into the patient's record.    Assessment/Narrative         The placement was negative for: blood aspirated, painful injection and site bleeding       Paresthesias: No.       Bolus given via needle. no blood aspirated via catheter.        Secured via.        Insertion/Infusion Method: Single Shot       Complications: none       Injection made incrementally with aspirations every 5 mL.    Medication(s) Administered   Bupivacaine 0.25% PF (Infiltration) - Infiltration   40 mL - 9/17/2022 8:00:00 PM  Bupivacaine liposome (Exparel) 1.3% LA inj susp (Infiltration) - Infiltration   20 mL - 9/17/2022 8:00:00 PM

## 2022-09-18 NOTE — PROGRESS NOTES
OB Postpartum Progress Note    S:   Feeling okay this morning. Sore when moving from laying to sitting. Pain well controlled with Tylenol and Toradol, avoiding narcotics due to h/o opioid use disorder. Reports midline pain over the incision and tenderness to touch. Lochia improving. Tolerating PO without nausea or emesis. Passing flatus, no BM yet.  Required richardson catheter for post-op urinary retention. Ambulating without dizziness or lightheadedness, just feels a little dizzy if she moves her head too fast. Endorses mild numbness from pelvis to knees states that the sensation feels dulled and that it is improving since yesterday. Pumping for baby in NICU, baby reportedly doing well. No headache, vision changes, CP, SOB, RUQ pain.    O:  Vitals:    22 0408 22 0732 22 1214 22 1522   BP: 101/62 107/68 107/66 124/60   BP Location: Right arm Right arm Right arm    Patient Position: Semi-Estrada's      Cuff Size: Adult Regular   Adult Regular   Pulse: 60 82 77 68   Resp: 16 16 16 16   Temp: 97.8  F (36.6  C) 97.6  F (36.4  C) 97.7  F (36.5  C) 98.2  F (36.8  C)   TempSrc: Oral Oral Oral Oral   SpO2: 99%   100%     Gen: NAD. Alert, oriented. Resting comfortably in bed.  CV: Warm and well perfused   Resp: Breathing comfortably on room air   Abd: Soft, appropriately tender, fundus firm at 2 cm below the umbilicus, appropriately tender  Incision: Skin glue in place, no overlying erythema or drainage  Ext: Trace lower extremity edema bilaterally     Labs:   Hemoglobin   Date Value Ref Range Status   2022 10.7 (L) 11.7 - 15.7 g/dL Final   2022 11.1 (L) 11.7 - 15.7 g/dL Final       A/P: Myrtle Strickland is a 27 year old  who is POD#2 s/p PLTCS for breech presentation and SROM. Pregnancy notable for h/o PTD at 34w4d, family h/o OTC deficiency, non-epileptic seizure disorder, anxiety, depression, ADHD, bipolar disorder, and autism spectrum disorder. Doing well postpartum. VSS and UOP  adequate, but has required straight cath x1 and richardson catheter placement.    # Postpartum Management  - Pain: Continue scheduled Tylenol, Toradol/ibuprofen, and prn oxycodone (patient has not taken any oxycodone due to history of opioid dependence)  - Heme: Appropriate blood loss during surgery. No s/s of ongoing blood loss. Hgb stable, 10.7. Continue to monitor vitals. Repeat hgb as clinically indicated.  - GI: Scheduled senna BID, simethicone TID. Prn miralax, suppository, anti-emetics  - : failed TOV yesterday and richardson was replaced. Plan to remove this AM at >12 hours. Order placed for 0800 removal  - PNC: Rh neg, rhogam eval in pp period; Rubella immune  - Pumping for baby in NICU; baby doing well   - Contraception: Desires NuvaRing at 6 wks. Discussed recommended 18 month spacing prior to next pregnancy.  - PPx: Encourage ambulation, IS, SCDs while confined to bed    # Postop Urinary Retention   - straight cathed x2, failed TOV yesterday, richardson replaced yesterday evening.     # Non-epileptic seizure disorder  - Continue PTA sertraline 50 mg qd     # H/o SARAH, MDD, BPD, ADHD, ASD  - s/p SW consult, patient declined services    Anticipate discharge POD#3    Ying Wilson MS3    Resident/Fellow Attestation   I, Jarrell Vaca, was present with the medical student who participated in the service and in the documentation of the note.  I have verified the history and personally performed the physical exam and medical decision making.  I agree with the assessment and plan of care as documented in the note.  I have reviewed the note and made changes as necessary.    Jarrell Vaca DO, MS  Obstetrics, Gynecology & Women's Health   Resident, PGY-2  09/19/2022 6:19 AM     /68 (BP Location: Right arm, Patient Position: Semi-Estrada's, Cuff Size: Adult Regular)   Pulse 53   Temp 97.6  F (36.4  C) (Oral)   Resp 16   LMP 01/22/2022   SpO2 100%   Breastfeeding Unknown   Hemoglobin   Date Value Ref  Range Status   09/18/2022 10.7 (L) 11.7 - 15.7 g/dL Final     The patient was seen and examined by me separately from the team.  I have reviewed and agree with the above note.  She is overall doing well today.  Pain is controlled, tolerating a regular diet.  Cristobal is out-hasn't been out long enough to try to void yet.  Baby is stable in the NICU.  She is open to late evening discharge if baby is moved to a private room, otherwise discussed likely discharge to home tomorrow.      Esther Agudelo MD, FACOG

## 2022-09-18 NOTE — PLAN OF CARE
Goal Outcome Evaluation:    VSS. Denies chest pain, SOB, dizziness. Pt reports some nausea and had one small emesis. Did not want IVF so oral hydration encouraged. Pt was straight cathed X 1 at 0330, is DTV by 0730. PP assessments WDL. Incision is MUNDO with some bruising and dried drainage around incision. No active bleeding at incision. Taking toradol and tylenol for pain. Pt also complaining of extreme itching. Nubain given X 2 with some relief. Pt is independently pumping for baby Mateo who is NICU 11. She went to visit him a couple of times this shift and is very optimistic. JAYLYN Vaughn is present as her support person.     Continue to assess and assist as needed per POC.

## 2022-09-18 NOTE — PROVIDER NOTIFICATION
22 [id number]   Provider Notification   Provider Name/Title Dr Del Cid   Method of Notification In Department   Request Evaluate in Person   Notification Reason Patient Arrived   Provider notified of patient arrival for PROM. VSS, assessment WDL. Pt leaking clear fluid. Denies bleeding. Having painful contractions and feeling pressure. Provider performed BSUS baby is breech. SVE 3-4cm. Plan is to prep for  for breech. Pt agreeable to plan. Partner at bedside. Will continue with plan of care.

## 2022-09-18 NOTE — BRIEF OP NOTE
[facility]   BRIEF OPERATIVE NOTE:  SECTION    Surgery Date:  2022   Surgeon(s): Gina Pascual MD  Assistants:  Esther Del Cid MD, PGY-3    Preoperative Diagnoses:  -  at 34w0d   - Breech   - PROM,  labor  - History of  delivery at 34w4d  - Family history of OTC deficiency  - Anxiety, depression, ADHD, bipolar disorder, and autism spectrum disorder  - Non epileptic seizure disorder  - Hx eating disorder  - Rubella, varicella NI    Postoperative diagnoses:  -  at 34w0d, now delivered  - Same as above     Procedure performed:  Primary low segment transverse  section via Pfannenstiel skin incision with double layer uterine closure    Anesthesia:  Spinal with duramorph  QBL (mL): 530 mL  Fluid replacement: 1000 mL crystalloid.   Specimens: Placenta, cord gases, cord segment  Complications: None apparent       Operative findings:  - No recto-fascial adhesions. No intraabdominal adhesions. No abdominal wall adhesions.   - Single, liveborn male infant at 1915 hours on 2022. Nuchal x1. Apgars and weight pending at time of note. Fetal presentation: mary breech, sacrum anterior. Amniotic fluid: Clear.    - Cord gases pending   - Placenta intact with 3 vessel cord.     - Normal appearing uterus, fallopian tubes, ovaries.   - Good uterine tone.     Transferred to PACU in stable condition.     Esther Del Cid MD  OB/GYN Resident PGY-3  7:46 PM 2022      Gina Pascual MD

## 2022-09-18 NOTE — DISCHARGE SUMMARY
[facility] Discharge Summary    Myrtle Strickland MRN# [id number]   Age: 27 year old YOB: 1995     Date of Admission:  2022  Date of Discharge::  2022   Admitting Physician:  Gina Pascual MD  Discharge Physician:  Sahra Worrell MD             Admission Diagnoses:   -  at 34w0d   - Breech   - PROM,  labor  - History of  delivery at 34w4d  - Family history of OTC deficiency  - Anxiety, depression, ADHD, bipolar disorder, and autism spectrum disorder  - Non epileptic seizure disorder  - Hx eating disorder  - Rubella, varicella NI          Discharge Diagnosis:     Same, delivered           Procedures:     Procedure(s): Primary low transverse  section with two layer closure via Pfannenstiel skin incision  Spinal                 Medications Prior to Admission:     Medications Prior to Admission   Medication Sig Dispense Refill Last Dose     hydrOXYzine (ATARAX) 25 MG tablet Take 1-2 tablets (25-50 mg) by mouth nightly as needed for itching 30 tablet 0      metoclopramide (REGLAN) 10 MG tablet  (Patient not taking: Reported on 2022)        ondansetron (ZOFRAN) 4 MG tablet ondansetron HCl 4 mg tablet   Take 1 tablet by mouth every 6-8 hours as directed.        Prenatal MV & Min w/FA-DHA (PRENATAL ADULT GUMMY/DHA/FA PO) Prenatal Gummies        promethazine (PHENERGAN) 25 MG tablet promethazine 25 mg tablet   Take 1 tablet by mouth every 6-8 hours by oral route.        sertraline (ZOLOFT) 50 MG tablet Take 1.5 tablets (75 mg) by mouth daily 60 tablet 1      sertraline (ZOLOFT) 50 MG tablet                 Discharge Medications:        Review of your medicines      START taking      Dose / Directions   acetaminophen 325 MG tablet  Commonly known as: TYLENOL      Dose: 650 mg  Take 2 tablets (650 mg) by mouth every 6 hours as needed for mild pain Start after Delivery.  Quantity: 100 tablet  Refills: 0     ibuprofen 600 MG tablet  Commonly known as:  ADVIL/MOTRIN      Dose: 600 mg  Take 1 tablet (600 mg) by mouth every 6 hours as needed for moderate pain Start after delivery  Quantity: 60 tablet  Refills: 0     oxyCODONE 5 MG tablet  Commonly known as: ROXICODONE      Dose: 2.5 mg  Take 0.5 tablets (2.5 mg) by mouth every 6 hours as needed for moderate pain  Quantity: 3 tablet  Refills: 0     senna-docusate 8.6-50 MG tablet  Commonly known as: SENOKOT-S/PERICOLACE      Dose: 1 tablet  Take 1 tablet by mouth daily Start after delivery.  Quantity: 100 tablet  Refills: 0        CONTINUE these medicines which have NOT CHANGED      Dose / Directions   hydrOXYzine 25 MG tablet  Commonly known as: ATARAX  Used for:  contractions      Dose: 25-50 mg  Take 1-2 tablets (25-50 mg) by mouth nightly as needed for itching  Quantity: 30 tablet  Refills: 0     metoclopramide 10 MG tablet  Commonly known as: REGLAN      Refills: 0     ondansetron 4 MG tablet  Commonly known as: ZOFRAN      ondansetron HCl 4 mg tablet   Take 1 tablet by mouth every 6-8 hours as directed.  Refills: 0     PRENATAL ADULT GUMMY/DHA/FA PO      Prenatal Gummies  Refills: 0     promethazine 25 MG tablet  Commonly known as: PHENERGAN      promethazine 25 mg tablet   Take 1 tablet by mouth every 6-8 hours by oral route.  Refills: 0     * sertraline 50 MG tablet  Commonly known as: ZOLOFT  Used for: Anxiety disorder, unspecified type      Dose: 75 mg  Take 1.5 tablets (75 mg) by mouth daily  Quantity: 60 tablet  Refills: 1     * sertraline 50 MG tablet  Commonly known as: ZOLOFT      Refills: 0         * This list has 2 medication(s) that are the same as other medications prescribed for you. Read the directions carefully, and ask your doctor or other care provider to review them with you.               Where to get your medicines      These medications were sent to [facility] [place], [place] - [address]  [address], [facility] [address]    Phone: [phone]      acetaminophen 325 MG tablet    ibuprofen 600 MG tablet    oxyCODONE 5 MG tablet    senna-docusate 8.6-50 MG tablet             Consultations:   Anesthesia           Brief Admission History:   Myrtle Strickland is a 27 year old  at 34w0d by LMP c/w 7w1d US here with SROM at 1650 and painful contractions. Pregnancy is notable for h/o PTD at 34w4d, family h/o OTC deficiency, non-epileptic seizure disorder, anxiety, depression, ADHD, bipolar disorder, and autism spectrum disorder. Fetus was found to be in breech presentation on BSUS. Patient counseled that recommendation would be to proceed with CS delivery, she is agreeable to this. The risks, benefits, and alternatives of  delivery were explained and the patient agreed to proceed.        Intraoperative course   The procedure was uncomplicated.   mL.  See operative report for details.     Operative findings:  - No recto-fascial adhesions. No intraabdominal adhesions. No abdominal wall adhesions.   - Single, liveborn male infant at 1915 hours on 2022. Nuchal x1. Apgars 8 and 8 at one and five minutes. Weight 2180 g. Fetal presentation: mary breech, sacrum anterior. Amniotic fluid: Clear.    - Arterial cord pH 7.32 with base excess -3.0. Venous cord pH 7.33 with base excess -3.5.  - Placenta intact with 3 vessel cord.     - Normal appearing uterus, fallopian tubes, ovaries.   - Good uterine tone.        Postpartum Course   The patient's hospital course was notable for post op urinary retention identified on POD#1. Cristobal was replaced and then removed on POD #2, after which patient successfully passed TOV. Now voiding spontaneously.  She recovered as anticipated and experienced no post-operative complications.  On discharge, her pain was well controlled. Vaginal bleeding is similar to peak menstrual flow.  Voiding without difficulty.  Ambulating well and tolerating a normal diet.  No fever or significant wound drainage.  Pumping for baby in NICU.  Infant overall stable. No bowel movement yet.  She was discharged on post-partum day #3. Patient Rh negative and received Rhogam evaluation and subsequent Rhogam shot prior to discharge home.    Post-partum hemoglobin:   Hemoglobin   Date Value Ref Range Status   09/17/2022 11.1 (L) 11.7 - 15.7 g/dL Final             Discharge Instructions and Follow-Up:     Discharge diet: Regular   Discharge activity: No lifting greater than 20 lbs, pushing, pulling, or other strenuous activity for 6 weeks. Pelvic rest for 6 weeks including no sexual intercourse, tampons, or douching. No driving until you can slam on the brakes without pain or while on narcotic pain medications.    Discharge follow-up: Follow up with primary OB for routine postpartum visit in 2 weeks and in 6 weeks for routine follow up visits   Wound care: Keep incision clean and dry           Discharge Disposition:     Discharged to home        Jarrlel Vaca DO, MS  Obstetrics, Gynecology & Women's Health   Resident, PGY-2  09/20/2022 7:34 AM     [facility] staff:  Appreciate note by Dr. Vaca.  I have seen and examined the patient without the resident. I have reviewed, edited, and agree with the note.      Sahra Worrell MD, FACOG  9/20/2022  1:54 PM

## 2022-09-18 NOTE — PROGRESS NOTES
OB Postpartum Progress Note    S: Feeling okay this morning. Sore when moving from laying to sitting. Pain well controlled with Tylenol and Toradol, avoiding narcotics due to h/o opioid use disorder. Lochia improving. Tolerating PO without nausea or emesis. Passing flatus, no BM yet. Required straight cath x1, working on hydrating so that she can void. Ambulating without dizziness or lightheadedness, just feels a little dizzy if she moves her head too fast. Pumping for baby in NICU, baby reportedly doing well. No headache, vision changes, CP, SOB, RUQ pain.     O:  Vitals:    22 0209 22 0315 22 0408 22 0732   BP: 103/53 105/81 101/62 107/68   BP Location: Right arm Right arm Right arm Right arm   Patient Position: Semi-Estrada's Semi-Estrada's Semi-Estrada's    Cuff Size: Adult Regular Adult Regular Adult Regular    Pulse: 63 59 60 82   Resp: 16 16 16 16   Temp: 97.9  F (36.6  C) 97.8  F (36.6  C) 97.8  F (36.6  C) 97.6  F (36.4  C)   TempSrc: Oral Oral Oral Oral   SpO2: 99% 100% 99%      Gen: NAD. Alert, oriented. Resting comfortably in bed.  CV: Warm and well perfused   Resp: Breathing comfortably on room air   Abd: Soft, appropriately tender, fundus firm at 2 cm below the umbilicus, appropriately tender  Incision: Skin glue in place, no overlying erythema or drainage  Ext: Trace lower extremity edema bilaterally     Labs:   Hemoglobin   Date Value Ref Range Status   2022 10.7 (L) 11.7 - 15.7 g/dL Final   2022 11.1 (L) 11.7 - 15.7 g/dL Final       A/P: Myrtle Strickland is a 27 year old  who is POD#1 s/p PLTCS for breech presentation and SROM. Pregnancy notable for h/o PTD at 34w4d, family h/o OTC deficiency, non-epileptic seizure disorder, anxiety, depression, ADHD, bipolar disorder, and autism spectrum disorder. Doing well postpartum. VSS and UOP adequate, but has required straight cath x1.    # Postpartum/Postop  - Pain: Continue scheduled Tylenol, Toradol/ibuprofen, and prn  oxycodone  - Heme: Appropriate blood loss during surgery. No s/s of ongoing blood loss. Hgb stable, 10.7. Continue to monitor vitals. Repeat hgb as clinically indicated..  - GI: Scheduled senna BID, simethicone TID. Prn miralax, suppository, anti-emetics  - : s/p Richardson, straight cath x1. Follow-up void this AM   - PNC: Rh neg, rhogam eval in pp period; Rubella immune  - Pumping for baby in NICU; baby doing well   - Contraception: Desires NuvaRing at 6 wks. Discussed recommended 18 month spacing prior to next pregnancy.  - PPx: Encourage ambulation, IS, SCDs while confined to bed    # Non-epileptic seizure disorder  - Continue PTA sertraline 50 mg qd     # H/o SARAH, MDD, BPD, ADHD, ASD  - SW consult in postpartum period       Anticipate discharge POD#2-3; once meeting postpartum discharge goals     Esther Del Cid MD  OB/GYN Resident PGY-3  8:18 AM September 18, 2022      Staff MD Note    I appreciate the note by Dr. Del Cid.  Any necessary changes have been made by me.  I saw and evaluated the patient and agree with the findings and plan of care as documented in the note. Patient having itching and discussed use of Nubain to assist, she is trying to hold off on this as knows can use sparingly.  Aware of pain control option but so far seems to be doing well with Toradol/Tylenol.  Having issues with voiding, plan to straight cath again and increase oral hydration.  Will bladder scan again if continued issues with voiding after 2nd straight cath will need to replace richardson catheter for bladder rest.  Aware and agreeable with plan.    Regina Cordoba MD

## 2022-09-18 NOTE — PROVIDER NOTIFICATION
09/17/22 [id number]   Provider Notification   Provider Name/Title Dr. Del Cid G3   Method of Notification Electronic Page   Request Evaluate-Remote   Notification Reason Medication Request     Pt still quite itchy, benadryl given with no relief. Are we able to get nubain ordered? Thanks!

## 2022-09-18 NOTE — ANESTHESIA CARE TRANSFER NOTE
Patient: Myrtle Strickland    Procedure: Procedure(s):   SECTION       Diagnosis: 37 weeks gestation of pregnancy [Z3A.37]  Diagnosis Additional Information: No value filed.    Anesthesia Type:   No value filed.     Note:    Oropharynx: spontaneously breathing  Level of Consciousness: awake  Oxygen Supplementation: room air    Independent Airway: airway patency satisfactory and stable  Dentition: dentition unchanged  Vital Signs Stable: post-procedure vital signs reviewed and stable  Report to RN Given: handoff report given  Patient transferred to: PACU    Handoff Report: Identifed the Patient, Identified the Reponsible Provider, Reviewed the pertinent medical history, Discussed the surgical course, Reviewed Intra-OP anesthesia mangement and issues during anesthesia, Set expectations for post-procedure period and Allowed opportunity for questions and acknowledgement of understanding      Vitals:  Vitals Value Taken Time   BP     Temp     Pulse     Resp     SpO2         Electronically Signed By: Cameron Sanchez MD  2022  8:07 PM

## 2022-09-18 NOTE — ANESTHESIA PROCEDURE NOTES
Intrathecal injection Procedure Note    Pre-Procedure   Staff -        Anesthesiologist:  Rick Brunner MD       Resident/Fellow: Cameron Sanchez MD       Performed By: resident       Location: OR       Pre-Anesthestic Checklist: patient identified, IV checked, risks and benefits discussed, informed consent, monitors and equipment checked, pre-op evaluation, at physician/surgeon's request and post-op pain management  Timeout:       Correct Patient: Yes        Correct Procedure: Yes        Correct Site: Yes        Correct Position: Yes   Procedure Documentation  Procedure: intrathecal injection       Patient Position: sitting       Patient Prep/Sterile Barriers: sterile gloves, mask, patient draped       Skin prep: Chloraprep       Insertion Site: L3-4. (midline approach).       Needle Gauge: 25.        Needle Length (Inches): 3.5        Spinal Needle Type: Pencan       Introducer used       Introducer: 20 G       # of attempts: 1 and  # of redirects:  1    Assessment/Narrative         Paresthesias: No.       CSF fluid: clear.       Opening pressure was cmH2O while  Sitting.      Medication(s) Administered   0.75% Hyperbaric Bupivacaine (Intrathecal) - Intrathecal   1.6 mL - 9/17/2022 7:00:00 PM  Morphine PF 1 mg/mL (Intrathecal) - Intrathecal   0.2 mg - 9/17/2022 7:00:00 PM  Fentanyl PF (Intrathecal) - Intrathecal   25 mcg - 9/17/2022 7:00:00 PM

## 2022-09-19 PROCEDURE — 120N000002 HC R&B MED SURG/OB UMMC

## 2022-09-19 PROCEDURE — 250N000013 HC RX MED GY IP 250 OP 250 PS 637: Performed by: STUDENT IN AN ORGANIZED HEALTH CARE EDUCATION/TRAINING PROGRAM

## 2022-09-19 RX ORDER — AMOXICILLIN 250 MG
1 CAPSULE ORAL DAILY
Qty: 100 TABLET | Refills: 0 | Status: SHIPPED | OUTPATIENT
Start: 2022-09-19 | End: 2023-08-23

## 2022-09-19 RX ORDER — IBUPROFEN 600 MG/1
600 TABLET, FILM COATED ORAL EVERY 6 HOURS PRN
Qty: 60 TABLET | Refills: 0 | Status: SHIPPED | OUTPATIENT
Start: 2022-09-19 | End: 2023-08-23

## 2022-09-19 RX ORDER — ACETAMINOPHEN 325 MG/1
650 TABLET ORAL EVERY 6 HOURS PRN
Qty: 100 TABLET | Refills: 0 | Status: SHIPPED | OUTPATIENT
Start: 2022-09-19 | End: 2023-08-23

## 2022-09-19 RX ADMIN — SIMETHICONE 80 MG: 80 TABLET, CHEWABLE ORAL at 09:32

## 2022-09-19 RX ADMIN — ACETAMINOPHEN 975 MG: 325 TABLET, FILM COATED ORAL at 04:20

## 2022-09-19 RX ADMIN — ACETAMINOPHEN 975 MG: 325 TABLET, FILM COATED ORAL at 10:36

## 2022-09-19 RX ADMIN — SIMETHICONE 80 MG: 80 TABLET, CHEWABLE ORAL at 22:38

## 2022-09-19 RX ADMIN — IBUPROFEN 800 MG: 800 TABLET, FILM COATED ORAL at 16:45

## 2022-09-19 RX ADMIN — SERTRALINE HYDROCHLORIDE 50 MG: 50 TABLET, FILM COATED ORAL at 08:01

## 2022-09-19 RX ADMIN — IBUPROFEN 800 MG: 800 TABLET, FILM COATED ORAL at 10:36

## 2022-09-19 RX ADMIN — SENNOSIDES AND DOCUSATE SODIUM 2 TABLET: 50; 8.6 TABLET ORAL at 08:01

## 2022-09-19 RX ADMIN — IBUPROFEN 800 MG: 800 TABLET, FILM COATED ORAL at 04:20

## 2022-09-19 RX ADMIN — ACETAMINOPHEN 975 MG: 325 TABLET, FILM COATED ORAL at 16:45

## 2022-09-19 RX ADMIN — SENNOSIDES AND DOCUSATE SODIUM 2 TABLET: 50; 8.6 TABLET ORAL at 19:31

## 2022-09-19 RX ADMIN — IBUPROFEN 800 MG: 800 TABLET, FILM COATED ORAL at 22:38

## 2022-09-19 RX ADMIN — SIMETHICONE 80 MG: 80 TABLET, CHEWABLE ORAL at 16:45

## 2022-09-19 RX ADMIN — METOCLOPRAMIDE 10 MG: 10 TABLET ORAL at 19:32

## 2022-09-19 ASSESSMENT — ACTIVITIES OF DAILY LIVING (ADL)
ADLS_ACUITY_SCORE: 18

## 2022-09-19 NOTE — PROGRESS NOTES
The patient has more sensations in BL thighs. She denied weakness or tingling.  Her MMT was 5/5 throughout.  She was able to void after Cristobal removal.    I instructed the patient to contact anesthesia during admission and OB provider if numbness persists after discharge.     Yenifer Izaguirre MD  Anesthesiology, CA-2

## 2022-09-19 NOTE — PROVIDER NOTIFICATION
09/19/22 1816   Provider Notification   Provider Name/Title G2 Lio   Method of Notification Electronic Page   Request Evaluate-Remote   Notification Reason Other   per pt, she will be discharging tomorrow am/pm. she says that she spoke with  this morning that she is more comfortable discharging tomorrow. thanks.

## 2022-09-19 NOTE — PLAN OF CARE
Goal Outcome Evaluation:    Plan of Care Reviewed With: patient     Overall Patient Progress: improving    VSS. Fundus midline, firm, and U/1. Scant lochia. Incision approximated with liquid bandage, scant dried drainage. Pain adequately managed with tylenol, ibuprofen, and simethicone. Ambulating independently, tolerating regular diet, one incidence of nausea this afternoon while moving into wheelchair after pumping, resulted in dry heaving but no emesis. Voiding without difficulty after removal of richardson. Pumping independently. Bonding well with  in NICU. Continue with plan of care.

## 2022-09-19 NOTE — PROGRESS NOTES
Post  Anesthesia Follow Up Note    Patient: Myrtle Strickland    Patient location: Postpartum floor.    Chief complaint: Acute postoperative pain management s/p intrathecal morphine administration     Procedure(s) Performed:  Procedure(s):   SECTION    Anesthesia type: Spinal Block    Subjective:     Pain Control: 3/10 at rest and 5/10 with ambulation    Additional ROS:  She does not complain of pruritis at this time. She denies weakness, denies paresthesia, denies difficulties breathing. She was not able to void yesterday s/p straight cath and richardson placement. She denies nausea or vomiting. She is able to ambulate and tolerates regular diet.    Objective:    Respiratory Function (RR / SpO2 / Airway Patency): Satisfactory    Cardiac Function (HR / Rhythm / BP): Satisfactory    Strength and sensation lower extremities: Normal    Site of spinal/epidural insertion: No signs of infection or inflammation.     Last Vitals: /84 (BP Location: Right arm, Patient Position: Sitting, Cuff Size: Adult Regular)   Pulse 61   Temp 98.2  F (36.8  C) (Oral)   Resp 16   LMP 2022   SpO2 100%   Breastfeeding Unknown     Assessment and plan:   Myrtle Strickland is a 27 year old female  POD #2 s/p  No admission procedures for hospital encounter. with IT bupivacaine (1.6ml), fentanyl (25mcg) and morphine (200mcg); and single shot TAP nerve block injections with 20 mL bupivacaine 0.25% with epinephrine 1:200,000, then 20mL liposome bupivacaine (Exparel) long-acting 1.3% given in the PACU for postoperative analgesia.  Pt is ambulating without difficulty, no weakness or paresthesias.  There is no evidence of adverse side effects associated with spinal and nerve block injections.  The patient is receiving adequate incisional pain control at this time and anticipate up to 72 hours of incisional pain control.  However, we further anticipate that the patient will require opioid/nonopioid analgesics for visceral and  muscle pain that is not controlled with local anesthetic.      In brief summary, her post-operative analgesia is adequate today. Further interventional analgesic strategies would be of little utility at this time. Thus, we recommend proceeding with PO analgesics including staggered dosing of NSAIDs (ibuprofen) and acetaminophen, with a taper of oxycodone.     Thank you for including us in the care for this patient.    Yenifer Izaguirre MD  Anesthesiology, CA-2

## 2022-09-19 NOTE — CONSULTS
"This psychosocial assessment was copied from baby's chart    [facility]  MATERNAL CHILD HEALTH   INITIAL NICU PSYCHOSOCIAL ASSESSMENT     DATA:     Presenting Information: Mom is a  who delivered a baby boy Mateo on 2022 at 34w0d gestation in the setting of PTL and PPROM. Baby was admitted to the NICU for management of prematurity.  SW was consulted to meet with this family per NICU admission of infant.     Living Situation: Parents are unmarried and live together in [place], [place] in [facility], along with Myrtle's 3 year old daughter.    Social Support: Parents report they support one another and have some support from friends    Education/Employment: Renan reports he works but when asked to specify he stated he works \"odd jobs, this and that\"  Myrtle receives some county assistance    Insurance: [employer] [place]    Source of Financial Support: Parental employment, SNAP, WIC    Mental Health History: yes    Diagnoses: OUD, ADHD, Depression, Anxiety, Bi-polar    Medications: Sertraline    Therapy: no    History of Postpartum Mood Disorders: yes    Chemical Health History: yes    Substances: opioids    Last Use/Frequency: in recovery for 7 years, 2019 relapsed after hospital prescribed Oxycodone for her .    Toxicology Screens in Pregnancy: no    Toxicology Screen at Delivery: no    Legal/Child Protection Involvement: denies    INTERVENTION:       Chart review    Collaboration with team    Conducted Psychosocial Assessment    Introduction to Maternal Child Health SW role and scope of practice    Orientation to the NICU (parking, lodging, meals, visitation)    Validated emotions and provided supportive listening    Provided resources and referrals    Monthly parking pass    Pregnancy and Post-partum [place]    Provided psychoeducation on  mood disorders and indicated that SW would continue to monitor mood and support bridging to mental " health resources as needed.    Provided SW contact info    ASSESSMENT:     Coping: Myrtle and Renan were at bedside in the NICU.  Renan was holding Mateo and speaking softly to him.  Myrtle reports she is doing okay but has some pain due to the fact she is not using opioids with this pregnancy.  She reports a history of OUD disorder but has been sober for 7 years.  She also reports she had a brief relapse in 2019 after the birth of her daughter when she was prescribed opiates. Myrtle reports a significant mental health history.  She takes sertraline for depression and anxiety.  Currently she reports her mood is okay but it will be hard to leave the hospital.  She reports she hopes she can stay in a private room with her baby.  Myrtle appears to have limited understanding of the treatment plan for Mateo.  She reports her daughter is staying with her father for an extended period so she can focus on the baby.  Myrtle is not currently in counseling but has in the past.  She has not found it helpful.  Renan reports Myrtle can be bruce and gets an attitude.  The writer provided information on PMAD and a brochure to Pregnancy and Post Partum [place].  Myrtle reports she doesn't really like to talk to people she doesn't know about personal things.  She reports her daughter was in the NICU for a month and it was really hard.  Parents seem to be bonding well with baby    Assessment of parental risk for PMAD: Higher than average risk, considering psychiatric history    Risk Factors: mental health concerns, not willing to engage in therapy    Resiliency Factors & Strengths: supportive partners, NICU experience    PLAN:     SW will continue to follow for supportive intervention.    Janeth BANKSW, MSW, [facility]  Maternal Child Health

## 2022-09-19 NOTE — PLAN OF CARE
Goal Outcome Evaluation:  Data: Vital signs within normal limits. Postpartum checks within normal limits - see flow record. Patient eating and drinking normally. Cristobal catheter in place and patient is up ambulating. No apparent signs of infection.   Patient performing self cares and is pumping and visiting baby in NICU.  Action: Patient medicated during the shift for pain. See MAR. Patient reassessed within 1 hour after each medication and pain was improved - patient stated she was comfortable. Patient education done about plan of care. See flow record.  Response: Positive attachment behaviors observed with infant. Support personRenan present.     Plan of Care Reviewed With: patient, significant other     Overall Patient Progress: improving

## 2022-09-20 VITALS
DIASTOLIC BLOOD PRESSURE: 83 MMHG | SYSTOLIC BLOOD PRESSURE: 124 MMHG | TEMPERATURE: 98.2 F | OXYGEN SATURATION: 100 % | HEART RATE: 64 BPM | RESPIRATION RATE: 16 BRPM

## 2022-09-20 LAB
ALBUMIN SERPL-MCNC: 2 G/DL (ref 3.4–5)
ALP SERPL-CCNC: 154 U/L (ref 40–150)
ALT SERPL W P-5'-P-CCNC: 14 U/L (ref 0–50)
AMMONIA PLAS-SCNC: 22 UMOL/L (ref 10–50)
ANION GAP SERPL CALCULATED.3IONS-SCNC: 5 MMOL/L (ref 3–14)
AST SERPL W P-5'-P-CCNC: 21 U/L (ref 0–45)
BILIRUB SERPL-MCNC: 0.2 MG/DL (ref 0.2–1.3)
BUN SERPL-MCNC: 19 MG/DL (ref 7–30)
CALCIUM SERPL-MCNC: 8.3 MG/DL (ref 8.5–10.1)
CHLORIDE BLD-SCNC: 112 MMOL/L (ref 94–109)
CO2 SERPL-SCNC: 25 MMOL/L (ref 20–32)
CREAT SERPL-MCNC: 1.53 MG/DL (ref 0.52–1.04)
GFR SERPL CREATININE-BSD FRML MDRD: 47 ML/MIN/1.73M2
GLUCOSE BLD-MCNC: 104 MG/DL (ref 70–99)
HOLD SPECIMEN: NORMAL
POTASSIUM BLD-SCNC: 3.7 MMOL/L (ref 3.4–5.3)
PROT SERPL-MCNC: 5.6 G/DL (ref 6.8–8.8)
SODIUM SERPL-SCNC: 142 MMOL/L (ref 133–144)

## 2022-09-20 PROCEDURE — 250N000013 HC RX MED GY IP 250 OP 250 PS 637: Performed by: STUDENT IN AN ORGANIZED HEALTH CARE EDUCATION/TRAINING PROGRAM

## 2022-09-20 PROCEDURE — 80053 COMPREHEN METABOLIC PANEL: CPT | Performed by: STUDENT IN AN ORGANIZED HEALTH CARE EDUCATION/TRAINING PROGRAM

## 2022-09-20 PROCEDURE — 250N000013 HC RX MED GY IP 250 OP 250 PS 637: Performed by: OBSTETRICS & GYNECOLOGY

## 2022-09-20 PROCEDURE — 82140 ASSAY OF AMMONIA: CPT | Performed by: STUDENT IN AN ORGANIZED HEALTH CARE EDUCATION/TRAINING PROGRAM

## 2022-09-20 PROCEDURE — 36415 COLL VENOUS BLD VENIPUNCTURE: CPT | Performed by: STUDENT IN AN ORGANIZED HEALTH CARE EDUCATION/TRAINING PROGRAM

## 2022-09-20 RX ORDER — PROMETHAZINE HYDROCHLORIDE 25 MG/1
25 TABLET ORAL EVERY 6 HOURS PRN
Status: DISCONTINUED | OUTPATIENT
Start: 2022-09-20 | End: 2022-09-20 | Stop reason: HOSPADM

## 2022-09-20 RX ORDER — OXYCODONE HYDROCHLORIDE 5 MG/1
2.5 TABLET ORAL EVERY 6 HOURS PRN
Qty: 3 TABLET | Refills: 0 | Status: SHIPPED | OUTPATIENT
Start: 2022-09-20 | End: 2023-08-23

## 2022-09-20 RX ADMIN — SIMETHICONE 80 MG: 80 TABLET, CHEWABLE ORAL at 20:03

## 2022-09-20 RX ADMIN — ACETAMINOPHEN 975 MG: 325 TABLET, FILM COATED ORAL at 11:31

## 2022-09-20 RX ADMIN — SIMETHICONE 80 MG: 80 TABLET, CHEWABLE ORAL at 05:08

## 2022-09-20 RX ADMIN — SENNOSIDES AND DOCUSATE SODIUM 2 TABLET: 50; 8.6 TABLET ORAL at 08:05

## 2022-09-20 RX ADMIN — METOCLOPRAMIDE 10 MG: 10 TABLET ORAL at 06:57

## 2022-09-20 RX ADMIN — Medication 2.5 MG: at 20:04

## 2022-09-20 RX ADMIN — IBUPROFEN 800 MG: 800 TABLET, FILM COATED ORAL at 11:31

## 2022-09-20 RX ADMIN — Medication 2.5 MG: at 14:47

## 2022-09-20 RX ADMIN — SIMETHICONE 80 MG: 80 TABLET, CHEWABLE ORAL at 14:57

## 2022-09-20 RX ADMIN — SERTRALINE HYDROCHLORIDE 50 MG: 50 TABLET, FILM COATED ORAL at 08:05

## 2022-09-20 RX ADMIN — SIMETHICONE 80 MG: 80 TABLET, CHEWABLE ORAL at 11:42

## 2022-09-20 RX ADMIN — ACETAMINOPHEN 975 MG: 325 TABLET, FILM COATED ORAL at 05:03

## 2022-09-20 RX ADMIN — METOCLOPRAMIDE 10 MG: 10 TABLET ORAL at 14:47

## 2022-09-20 RX ADMIN — IBUPROFEN 800 MG: 800 TABLET, FILM COATED ORAL at 05:03

## 2022-09-20 ASSESSMENT — ACTIVITIES OF DAILY LIVING (ADL)
ADLS_ACUITY_SCORE: 18

## 2022-09-20 NOTE — PLAN OF CARE
Goal Outcome Evaluation:    Plan of Care Reviewed With: patient, significant other     Overall Patient Progress: improving    VSS. Fundus midline, firm, and U/1. Scant lochia. Incision approximated with liquid glue; no drainage noted. Intermittent nausea and given Reglan once. Pain adequately managed with scheduled pain meds. Ambulating independently, tolerating regular diet, and voiding without difficulty. pumping independently with encouragement for  in NICU. Visiting  in NICU. Continue with plan of care.

## 2022-09-20 NOTE — CONSULTS
This writer attempted to meet with Myrtle to review her mental health support plan due to her elevated EDS.  She was asleep and did not wish to engage in conversation.  This writer did discuss MH supports during her NICU psychosocial assessment and will continue to offer support and resources during Mateo's NICU stay.    Janeth BLANCO, MSW, [facility]  Maternal Child Health

## 2022-09-20 NOTE — PLAN OF CARE
"VSS and postpartum assessments WDL, ex intermittent nausea.  Up ad everardo with steady gait and independent with cares.  Bonding well with infant, Mateo, in the NICU. Pumping independently.  Pain managed with tylenol, ibuprofen, and one-time 2.5mg oxycodone, as a trial of \"how it would feel\".  Her partner Dayne was not present on this shift, but is supposed to arrive later this afternoon to bring her home.  Will continue with postpartum cares and education per plan of care.   "

## 2022-09-20 NOTE — PLAN OF CARE
Problem: Plan of Care - These are the overarching goals to be used throughout the patient stay.    Goal: Patient-Specific Goal (Individualized)  Description: Pt goal to have pain and nausea managed  Outcome: Ongoing, Progressing     Problem: Plan of Care - These are the overarching goals to be used throughout the patient stay.    Goal: Optimal Comfort and Wellbeing  Intervention: Monitor Pain and Promote Comfort  Recent Flowsheet Documentation  Taken 9/20/2022 0230 by Cinda Myers RN  Pain Management Interventions: pain management plan reviewed with patient/caregiver   Goal Outcome Evaluation:           Data: VSS, postpartum assessments WNL. She is voiding without difficulty, up ad everardo, passing gas, eating and drinking normally. Incision appears to be healing well, lochia WNL, no s/s infection. /Pumping with independently, visiting infant in NICU. Taking tylenol and ibuprofen for pain. Pt wants to discuss pain meds for discharge with provider.   Action: Education provided on plan of care.  Response: Pt is agreeable with her plan of care. Positive attachment behaviors observed with infant.  Anticipate discharge today.

## 2022-09-20 NOTE — PROVIDER NOTIFICATION
09/19/22 2306   Provider Notification   Provider Name/Title G2 Lio   Method of Notification Electronic Page   Request Evaluate-Remote   Notification Reason Other   FYI: EDS (depression scale) is 18. NO self harm of others.   thanks

## 2022-09-20 NOTE — PROGRESS NOTES
OB Postpartum Progress Note    S: Attempted to round on patient x2 this morning. Patient in NICU visiting baby. Per RN staff patient is aware of plan for discharge home today as discussed and reviewed yesterday evening.     O:  Vitals:    22 1522 22 2313 22 0800 22 1827   BP: 124/60 112/84 107/68 125/74   BP Location:  Right arm Right arm    Patient Position:  Sitting Semi-Estrada's    Cuff Size: Adult Regular Adult Regular Adult Regular Adult Regular   Pulse: 68 61 53 66   Resp: 16 16 16 16   Temp: 98.2  F (36.8  C) 98.2  F (36.8  C) 97.6  F (36.4  C) 98.3  F (36.8  C)   TempSrc: Oral Oral Oral Oral   SpO2: 100%          Labs:   Hemoglobin   Date Value Ref Range Status   2022 10.7 (L) 11.7 - 15.7 g/dL Final   2022 11.1 (L) 11.7 - 15.7 g/dL Final       A/P: Myrtle Strickland is a 27 year old  who is POD#3 s/p PLTCS for breech presentation and SROM. Pregnancy notable for h/o PTD at 34w4d, family h/o OTC deficiency, non-epileptic seizure disorder, anxiety, depression, ADHD, bipolar disorder, and autism spectrum disorder. Doing well postpartum. POUR resolved. Unable to round on patient this morning as patient was in NICU. Review of vitals show patient is stable. Per RN patient is aware of plan for discharge home today. Plan for discharge home was initially discussed with patient yesterday.      # Postpartum Management  - Pain: Continue scheduled Tylenol, Toradol/ibuprofen, and prn oxycodone (patient has not taken any oxycodone due to history of opioid dependence)  - Heme: Appropriate blood loss during surgery. No s/s of ongoing blood loss. Hgb stable, 10.7   - GI: Scheduled senna BID, simethicone TID. Prn miralax, suppository, anti-emetics  - : POUR now resolved. Voiding spontaneously.   - PNC: Rh neg, rhogam eval in pp period; Rubella immune  - Pumping for baby in NICU; baby doing well   - Contraception: Desires NuvaRing at 6 wks. Discussed recommended 18 month spacing prior to  next pregnancy.  - PPx: Encourage ambulation, IS, SCDs while confined to bed    # Postop Urinary Retention   - now resolved, voiding spontenously    # Non-epileptic seizure disorder  - Continue PTA sertraline 50 mg qd     # H/o SARAH, MDD, BPD, ADHD, ASD  - s/p SW consult, patient declined services    Anticipate discharge home today    Jarrell Vaca DO, MS  Obstetrics, Gynecology & Women's Health   Resident, PGY-2  09/19/2022 10:53 PM     [facility] staff:  Appreciate note by Dr. Vaca.  I have seen and examined the patient without the resident. I have reviewed, edited, and agree with the note.    My findings are: Pt is well appearing.  VSS.  She asks for small amount of narcotic for discharge.  Plans to use 1/2 tablets as needed.     Sahra Worrell MD, FACOG  9/20/2022  1:52 PM

## 2022-09-21 ENCOUNTER — PATIENT OUTREACH (OUTPATIENT)
Dept: CARE COORDINATION | Facility: CLINIC | Age: 27
End: 2022-09-21

## 2022-09-21 NOTE — PROGRESS NOTES
Clinic Care Coordination Contact  Community Health Worker Initial Outreach    Pt stated that she is at NICU with her baby, declined needs for extra support.     Clinic Care Coordination Contact  [facility]: Post-Discharge Note  SITUATION                                                      Admission:    Admission Date: 22   Reason for Admission: -  at 34w0d   - Breech   - PROM,  labor  - History of  delivery at 34w4d  - Family history of OTC deficiency  - Anxiety, depression, ADHD, bipolar disorder, and autism spectrum disorder  - Non epileptic seizure disorder  - Hx eating disorder  - Rubella, varicella NI  Discharge:   Discharge Date: 22  Discharge Diagnosis: -  at 34w0d   - Breech   - PROM,  labor  - History of  delivery at 34w4d  - Family history of OTC deficiency  - Anxiety, depression, ADHD, bipolar disorder, and autism spectrum disorder  - Non epileptic seizure disorder  - Hx eating disorder  - Rubella, varicella NI    BACKGROUND                                                      Per hospital discharge summary and inpatient provider notes:    Myrtle Strickland is a 27 year old  at 34w0d by LMP c/w 7w1d US here with SROM at 1650 and painful contractions. Pregnancy is notable for h/o PTD at 34w4d, family h/o OTC deficiency, non-epileptic seizure disorder, anxiety, depression, ADHD, bipolar disorder, and autism spectrum disorder. Fetus was found to be in breech presentation on BSUS.    ASSESSMENT         Discharge Assessment  How are you doing now that you are home?: doing well  How are your symptoms? (Red Flag symptoms escalate to triage hotline per guidelines): Unchanged  Do you feel your condition is stable enough to be safe at home until your provider visit?: Yes  Does the patient have their discharge instructions? : Yes  Does the patient have questions regarding their discharge instructions? : No  Were you started on any new medications or  were there changes to any of your previous medications? : Yes  Does the patient have all of their medications?: Yes  Do you have questions regarding any of your medications? : No  Do you have all of your needed medical supplies or equipment (DME)?  (i.e. oxygen tank, CPAP, cane, etc.): Yes  Discharge follow-up appointment scheduled within 14 calendar days? : Yes  Discharge Follow Up Appointment Date: 09/27/22  Discharge Follow Up Appointment Scheduled with?: Specialty Care Provider    Post-op ([facility] CTA Only)  If the patient had a surgery or procedure, do they have any questions for a nurse?: No         PLAN                                                      Outpatient Plan:     Discharge follow-up: Follow up with primary OB for routine postpartum visit in 2 weeks and in 6 weeks for routine follow up visits     Future Appointments   Date Time Provider Department Center   9/27/2022 11:00 AM Gretel Meraz LGSW [facility]       For any urgent concerns, please contact our 24 hour nurse triage line: [phone] ([phone])       [facility]  [phone]  [facility]

## 2022-09-21 NOTE — PLAN OF CARE
Goal Outcome Evaluation:    Plan of Care Reviewed With: patient     Overall Patient Progress: improving  Pt returned from NICU and ready to be discharged. Went over discharge instructions, take home meds given along with instructions on how to take them. Collected birth certificate, pt will take ROP with her and get it notarized at a later date. Pt has watched her required videos and denies any further questions. Infant remains in the NICU at the time of discharge. Pt signed out/discharged at 2030.

## 2022-09-21 NOTE — PLAN OF CARE
Patient declined assessment at 1800 as she was getting ready to go to NICU to visit infant. Denies s/s of pre-e, denies pain. All discharge paperwork ready but patient wished to go to NICU first. Oncoming RN made aware to go over AVS and give discharge medications, and collect birth certificate upon patient return to unit.

## 2022-09-22 ENCOUNTER — TELEPHONE (OUTPATIENT)
Dept: MATERNAL FETAL MEDICINE | Facility: CLINIC | Age: 27
End: 2022-09-22

## 2022-09-22 DIAGNOSIS — E72.4 OTC (ORNITHINE TRANSCARBAMYLASE DEFICIENCY) (H): ICD-10-CM

## 2022-09-22 NOTE — TELEPHONE ENCOUNTER
I called Myrtle to discuss her lab results from the time of hospital discharge (9/20/22). Her ammonia level was normal. However, her creatinine was elevated at 1.56 mg/dL. We discussed potential etiologies for elevated creatinine, including medication (NSAIDS), preeclampsia, dehydration, obstruction, UTI etc. Myrtle notes that she is feeling well with no concerns. Her blood pressure was normal during her hospitalization. She has not taken any Ibuprofen since hospital discharge.    Recommend a clinic visit today for blood pressure check and labs (CMP, ammonia, UPC and urinalysis). Myrtle is unsure if she can get a ride during clinic hours today. She will call our office today to let us know if she can make it to our clinic, and if not, we will still plan to have her go the laboratory later today to repeat her labs.    Carrie Roper MD  Specialist in Maternal-Fetal Medicine

## 2022-09-26 ENCOUNTER — TELEPHONE (OUTPATIENT)
Dept: MATERNAL FETAL MEDICINE | Facility: CLINIC | Age: 27
End: 2022-09-26

## 2022-09-27 ENCOUNTER — DOCUMENTATION ONLY (OUTPATIENT)
Dept: BEHAVIORAL HEALTH | Facility: CLINIC | Age: 27
End: 2022-09-27

## 2022-09-27 ENCOUNTER — OFFICE VISIT (OUTPATIENT)
Dept: BEHAVIORAL HEALTH | Facility: CLINIC | Age: 27
End: 2022-09-27
Payer: COMMERCIAL

## 2022-09-27 DIAGNOSIS — F33.2 SEVERE EPISODE OF RECURRENT MAJOR DEPRESSIVE DISORDER, WITHOUT PSYCHOTIC FEATURES (H): Primary | ICD-10-CM

## 2022-09-27 DIAGNOSIS — F41.9 ANXIETY DISORDER, UNSPECIFIED TYPE: ICD-10-CM

## 2022-09-27 PROCEDURE — 90834 PSYTX W PT 45 MINUTES: CPT

## 2022-09-27 ASSESSMENT — ANXIETY QUESTIONNAIRES
IF YOU CHECKED OFF ANY PROBLEMS ON THIS QUESTIONNAIRE, HOW DIFFICULT HAVE THESE PROBLEMS MADE IT FOR YOU TO DO YOUR WORK, TAKE CARE OF THINGS AT HOME, OR GET ALONG WITH OTHER PEOPLE: VERY DIFFICULT
8. IF YOU CHECKED OFF ANY PROBLEMS, HOW DIFFICULT HAVE THESE MADE IT FOR YOU TO DO YOUR WORK, TAKE CARE OF THINGS AT HOME, OR GET ALONG WITH OTHER PEOPLE?: VERY DIFFICULT
GAD7 TOTAL SCORE: 18
2. NOT BEING ABLE TO STOP OR CONTROL WORRYING: NEARLY EVERY DAY
GAD7 TOTAL SCORE: 18
1. FEELING NERVOUS, ANXIOUS, OR ON EDGE: NEARLY EVERY DAY
4. TROUBLE RELAXING: NEARLY EVERY DAY
3. WORRYING TOO MUCH ABOUT DIFFERENT THINGS: NEARLY EVERY DAY
5. BEING SO RESTLESS THAT IT IS HARD TO SIT STILL: NOT AT ALL
6. BECOMING EASILY ANNOYED OR IRRITABLE: NEARLY EVERY DAY
7. FEELING AFRAID AS IF SOMETHING AWFUL MIGHT HAPPEN: NEARLY EVERY DAY
7. FEELING AFRAID AS IF SOMETHING AWFUL MIGHT HAPPEN: NEARLY EVERY DAY
GAD7 TOTAL SCORE: 18

## 2022-09-27 ASSESSMENT — PATIENT HEALTH QUESTIONNAIRE - PHQ9
10. IF YOU CHECKED OFF ANY PROBLEMS, HOW DIFFICULT HAVE THESE PROBLEMS MADE IT FOR YOU TO DO YOUR WORK, TAKE CARE OF THINGS AT HOME, OR GET ALONG WITH OTHER PEOPLE: VERY DIFFICULT
SUM OF ALL RESPONSES TO PHQ QUESTIONS 1-9: 14
SUM OF ALL RESPONSES TO PHQ QUESTIONS 1-9: 14
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
## 2022-09-30 ENCOUNTER — TELEPHONE (OUTPATIENT)
Dept: MATERNAL FETAL MEDICINE | Facility: CLINIC | Age: 27
End: 2022-09-30

## 2022-09-30 ENCOUNTER — LAB (OUTPATIENT)
Dept: LAB | Facility: CLINIC | Age: 27
End: 2022-09-30
Payer: COMMERCIAL

## 2022-09-30 DIAGNOSIS — E72.4 OTC (ORNITHINE TRANSCARBAMYLASE DEFICIENCY) (H): ICD-10-CM

## 2022-09-30 LAB
ALBUMIN SERPL-MCNC: 3.5 G/DL (ref 3.4–5)
ALBUMIN UR-MCNC: 30 MG/DL
ALP SERPL-CCNC: 187 U/L (ref 40–150)
ALT SERPL W P-5'-P-CCNC: 20 U/L (ref 0–50)
ANION GAP SERPL CALCULATED.3IONS-SCNC: 5 MMOL/L (ref 3–14)
APPEARANCE UR: ABNORMAL
AST SERPL W P-5'-P-CCNC: 16 U/L (ref 0–45)
BACTERIA #/AREA URNS HPF: ABNORMAL /HPF
BILIRUB SERPL-MCNC: 0.5 MG/DL (ref 0.2–1.3)
BILIRUB UR QL STRIP: NEGATIVE
BUN SERPL-MCNC: 25 MG/DL (ref 7–30)
CALCIUM SERPL-MCNC: 9.4 MG/DL (ref 8.5–10.1)
CHLORIDE BLD-SCNC: 103 MMOL/L (ref 94–109)
CO2 SERPL-SCNC: 30 MMOL/L (ref 20–32)
COLOR UR AUTO: ABNORMAL
CREAT SERPL-MCNC: 2.33 MG/DL (ref 0.52–1.04)
CREAT UR-MCNC: 77 MG/DL
ERYTHROCYTE [DISTWIDTH] IN BLOOD BY AUTOMATED COUNT: 12 % (ref 10–15)
GFR SERPL CREATININE-BSD FRML MDRD: 29 ML/MIN/1.73M2
GLUCOSE BLD-MCNC: 96 MG/DL (ref 70–99)
GLUCOSE UR STRIP-MCNC: NEGATIVE MG/DL
HCT VFR BLD AUTO: 42.7 % (ref 35–47)
HGB BLD-MCNC: 14.4 G/DL (ref 11.7–15.7)
HGB UR QL STRIP: ABNORMAL
HOLD SPECIMEN: NORMAL
KETONES UR STRIP-MCNC: NEGATIVE MG/DL
LEUKOCYTE ESTERASE UR QL STRIP: ABNORMAL
MCH RBC QN AUTO: 27.7 PG (ref 26.5–33)
MCHC RBC AUTO-ENTMCNC: 33.7 G/DL (ref 31.5–36.5)
MCV RBC AUTO: 82 FL (ref 78–100)
MUCOUS THREADS #/AREA URNS LPF: PRESENT /LPF
NITRATE UR QL: NEGATIVE
PH UR STRIP: 5.5 [PH] (ref 5–7)
PLATELET # BLD AUTO: 476 10E3/UL (ref 150–450)
POTASSIUM BLD-SCNC: 3.6 MMOL/L (ref 3.4–5.3)
PROT SERPL-MCNC: 8.2 G/DL (ref 6.8–8.8)
PROT UR-MCNC: 0.47 G/L
PROT/CREAT 24H UR: 0.61 G/G CR (ref 0–0.2)
RBC # BLD AUTO: 5.19 10E6/UL (ref 3.8–5.2)
RBC URINE: 39 /HPF
SODIUM SERPL-SCNC: 138 MMOL/L (ref 133–144)
SP GR UR STRIP: 1.01 (ref 1–1.03)
SQUAMOUS EPITHELIAL: 3 /HPF
TRANSITIONAL EPI: <1 /HPF
UROBILINOGEN UR STRIP-MCNC: NORMAL MG/DL
WBC # BLD AUTO: 8.5 10E3/UL (ref 4–11)
WBC URINE: 36 /HPF

## 2022-09-30 PROCEDURE — 85014 HEMATOCRIT: CPT

## 2022-09-30 PROCEDURE — 80053 COMPREHEN METABOLIC PANEL: CPT

## 2022-09-30 PROCEDURE — 81001 URINALYSIS AUTO W/SCOPE: CPT

## 2022-09-30 PROCEDURE — 36415 COLL VENOUS BLD VENIPUNCTURE: CPT

## 2022-09-30 PROCEDURE — 84156 ASSAY OF PROTEIN URINE: CPT

## 2022-09-30 NOTE — TELEPHONE ENCOUNTER
Spoke with pt to see if she is able to get her labs drawn today, pt states she will be able to this afternoon. PT reports that she still does have nausea, but is doing well otherwise.  PT states he has RANKIN's but is normal for her. Writer encourage pt that it is important to get her labs drawn and we would like to see her for a BP check today in clinic. PT agreeable to plan and will come in today for labs then come to [facility].  Meghan Umanzor RN

## 2022-10-03 ENCOUNTER — TELEPHONE (OUTPATIENT)
Dept: MATERNAL FETAL MEDICINE | Facility: CLINIC | Age: 27
End: 2022-10-03

## 2022-10-03 ENCOUNTER — LAB (OUTPATIENT)
Dept: LAB | Facility: CLINIC | Age: 27
End: 2022-10-03
Payer: COMMERCIAL

## 2022-10-03 DIAGNOSIS — R79.89 ELEVATED SERUM CREATININE: Primary | ICD-10-CM

## 2022-10-03 LAB
ERYTHROCYTE [DISTWIDTH] IN BLOOD BY AUTOMATED COUNT: 11.8 % (ref 10–15)
HCT VFR BLD AUTO: 38.2 % (ref 35–47)
HGB BLD-MCNC: 12.8 G/DL (ref 11.7–15.7)
MCH RBC QN AUTO: 27.7 PG (ref 26.5–33)
MCHC RBC AUTO-ENTMCNC: 33.5 G/DL (ref 31.5–36.5)
MCV RBC AUTO: 83 FL (ref 78–100)
PLATELET # BLD AUTO: 451 10E3/UL (ref 150–450)
RBC # BLD AUTO: 4.62 10E6/UL (ref 3.8–5.2)
WBC # BLD AUTO: 6.5 10E3/UL (ref 4–11)

## 2022-10-03 PROCEDURE — 80048 BASIC METABOLIC PNL TOTAL CA: CPT

## 2022-10-03 PROCEDURE — 85027 COMPLETE CBC AUTOMATED: CPT

## 2022-10-03 PROCEDURE — 84450 TRANSFERASE (AST) (SGOT): CPT

## 2022-10-03 PROCEDURE — 36415 COLL VENOUS BLD VENIPUNCTURE: CPT

## 2022-10-03 PROCEDURE — 84460 ALANINE AMINO (ALT) (SGPT): CPT

## 2022-10-03 PROCEDURE — 87086 URINE CULTURE/COLONY COUNT: CPT

## 2022-10-03 NOTE — TELEPHONE ENCOUNTER
Called pt with recommendation from Dr. Roper that pt go to ER for full evaluation r/t elevated creatinine. Pt states she does not have  or available ride and cannot go to ER. Writer offered coordination of medical cab but pt declined. She has lab appt at [facility] at 1700 today.

## 2022-10-04 LAB
ALT SERPL W P-5'-P-CCNC: 20 U/L (ref 0–50)
ANION GAP SERPL CALCULATED.3IONS-SCNC: 5 MMOL/L (ref 3–14)
AST SERPL W P-5'-P-CCNC: 16 U/L (ref 0–45)
BUN SERPL-MCNC: 20 MG/DL (ref 7–30)
CALCIUM SERPL-MCNC: 9.5 MG/DL (ref 8.5–10.1)
CHLORIDE BLD-SCNC: 104 MMOL/L (ref 94–109)
CO2 SERPL-SCNC: 31 MMOL/L (ref 20–32)
CREAT SERPL-MCNC: 1.8 MG/DL (ref 0.52–1.04)
GFR SERPL CREATININE-BSD FRML MDRD: 39 ML/MIN/1.73M2
GLUCOSE BLD-MCNC: 84 MG/DL (ref 70–99)
POTASSIUM BLD-SCNC: 4 MMOL/L (ref 3.4–5.3)
SODIUM SERPL-SCNC: 140 MMOL/L (ref 133–144)

## 2022-10-05 ENCOUNTER — LAB REQUISITION (OUTPATIENT)
Dept: LAB | Facility: CLINIC | Age: 27
End: 2022-10-05
Payer: COMMERCIAL

## 2022-10-05 DIAGNOSIS — R79.89 OTHER SPECIFIED ABNORMAL FINDINGS OF BLOOD CHEMISTRY: ICD-10-CM

## 2022-10-05 LAB
BACTERIA UR CULT: NO GROWTH
CREAT SERPL-MCNC: 1.6 MG/DL (ref 0.51–0.95)
GFR SERPL CREATININE-BSD FRML MDRD: 45 ML/MIN/1.73M2

## 2022-10-05 PROCEDURE — 82565 ASSAY OF CREATININE: CPT | Mod: ORL | Performed by: OBSTETRICS & GYNECOLOGY

## 2022-10-13 ASSESSMENT — COLUMBIA-SUICIDE SEVERITY RATING SCALE - C-SSRS
5. HAVE YOU STARTED TO WORK OUT OR WORKED OUT THE DETAILS OF HOW TO KILL YOURSELF? DO YOU INTEND TO CARRY OUT THIS PLAN?: YES
TOTAL  NUMBER OF INTERRUPTED ATTEMPTS LIFETIME: NO
REASONS FOR IDEATION PAST MONTH: COMPLETELY TO END OR STOP THE PAIN (YOU COULDN'T GO ON LIVING WITH THE PAIN OR HOW YOU WERE FEELING)
REASONS FOR IDEATION LIFETIME: EQUALLY TO GET ATTENTION, REVENGE, OR A REACTION FROM OTHERS AND TO END/STOP THE PAIN
2. HAVE YOU ACTUALLY HAD ANY THOUGHTS OF KILLING YOURSELF?: NO
1. IN THE PAST MONTH, HAVE YOU WISHED YOU WERE DEAD OR WISHED YOU COULD GO TO SLEEP AND NOT WAKE UP?: YES
5. HAVE YOU STARTED TO WORK OUT OR WORKED OUT THE DETAILS OF HOW TO KILL YOURSELF? DO YOU INTEND TO CARRY OUT THIS PLAN?: NO
ATTEMPT LIFETIME: YES
3. HAVE YOU BEEN THINKING ABOUT HOW YOU MIGHT KILL YOURSELF?: NO
LETHALITY/MEDICAL DAMAGE CODE FIRST POTENTIAL ATTEMPT: BEHAVIOR LIKELY TO RESULT IN INJURY BUT NOT LIKELY TO CAUSE DEATH
TOTAL  NUMBER OF ABORTED OR SELF INTERRUPTED ATTEMPTS LIFETIME: NO
LETHALITY/MEDICAL DAMAGE CODE MOST RECENT POTENTIAL ATTEMPT: BEHAVIOR LIKELY TO RESULT IN INJURY BUT NOT LIKELY TO CAUSE DEATH
4. HAVE YOU HAD THESE THOUGHTS AND HAD SOME INTENTION OF ACTING ON THEM?: NO
1. HAVE YOU WISHED YOU WERE DEAD OR WISHED YOU COULD GO TO SLEEP AND NOT WAKE UP?: YES
2. HAVE YOU ACTUALLY HAD ANY THOUGHTS OF KILLING YOURSELF?: YES
TOTAL  NUMBER OF ACTUAL ATTEMPTS LIFETIME: 3
LETHALITY/MEDICAL DAMAGE CODE MOST LETHAL ACTUAL ATTEMPT: MODERATE PHYSICAL DAMAGE, MEDICAL ATTENTION NEEDED
ATTEMPT PAST THREE MONTHS: NO
6. HAVE YOU EVER DONE ANYTHING, STARTED TO DO ANYTHING, OR PREPARED TO DO ANYTHING TO END YOUR LIFE?: NO
LETHALITY/MEDICAL DAMAGE CODE MOST LETHAL POTENTIAL ATTEMPT: BEHAVIOR LIKELY TO RESULT IN INJURY BUT NOT LIKELY TO CAUSE DEATH

## 2022-10-13 NOTE — PROGRESS NOTES
"SAFETY PLAN:  Step 1: Warning signs / cues (Thoughts, images, mood, situation, behavior) that a crisis may be developing:    Thoughts: My family would be better off if I left    Images: Intrusive mental imagery of harming myself    Mood: More easily agitated     Behaviors: Sleeping in odd places (such as on the floor), telling others \"I forgot to eat\"    Cravings to use substances  Step 2: Coping strategies - Things I can do to take my mind off of my problems without contacting another person (relaxation technique, physical activity):    Distress Tolerance Strategies: Listen to podcasts/music/noise, socialize with support people    Physical Activities: Swimming     Focus on helpful thoughts: \"I need to get through this for my kids\"  Step 3: People and social settings that provide distraction:   Name: Renan     Name: Mom     Name: Dejuan   Name: Natalee   Step 4: Remind myself of people and things that are important to me and worth living for: My kids  Step 5: When I am in crisis, I can ask these people to help me use my safety plan:  Renan, Radha, Natalee Zaidi  Step 6: Making the environment safe:     Fiance monitoring medications, hide sharps, be around others  Step 7: Professionals or agencies I can contact during a crisis:    Suicide Prevention Lifeline: Call or Text 523   [facility] Crisis Services: [facility] COPE: [phone]    Call **CRISIS 505044 or text [phone]     Call 911 or go to my nearest emergency department    "

## 2023-01-05 ENCOUNTER — VIRTUAL VISIT (OUTPATIENT)
Dept: CONSULT | Facility: CLINIC | Age: 28
End: 2023-01-05
Attending: PEDIATRICS
Payer: COMMERCIAL

## 2023-01-05 DIAGNOSIS — E72.4 OTC (ORNITHINE TRANSCARBAMYLASE DEFICIENCY) (H): Primary | ICD-10-CM

## 2023-01-05 PROCEDURE — 99215 OFFICE O/P EST HI 40 MIN: CPT | Mod: GT | Performed by: PEDIATRICS

## 2023-01-05 ASSESSMENT — PAIN SCALES - GENERAL: PAINLEVEL: NO PAIN (0)

## 2023-01-05 NOTE — LETTER
"2023      RE: Myrtle Strickland  [address]  [place] [address]     Dear Colleague,    Thank you for the opportunity to participate in the care of your patient, Myrtle Strickland, at the [facility] at [facility]. Please see a copy of my visit note below.    Myrtle Strickland  is being evaluated via a billable video visit.      How would you like to obtain your AVS? [email or web address]  For the video visit, send the invitation by: Text to cell phone: [phone]  Will anyone else be joining your video visit? No        Video-Visit Details    Type of service:  Video Visit    Video Start Time: 3:00 PM  Video End Time (time video stopped): 3:20 PM  Originating Location (pt. Location): Home    Distant Location (provider location):  [facility]    Mode of Communication:  Video Conference via [facility]                       OUTPATIENT METABOLIC FOLLOW UP          Date: 2023      Patient:  Myrtle Strickland   :   1995   MRN:     [id number]      Myrtle Strickland  [address]  [place] [address]    Dear  Provider Not In System and Myrtle Strickland,    Thank you for sending Myrtle Strickland to the [facility] Monday \"Metabolic clinic\" for consultation and treatment of:    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)  No definite biochemical evidence    PAST MEDICAL HISTORY:    From the oral history, and medical records that are available, these items are noted:    Patient Active Problem List   Diagnosis     Asperger's disorder     Anxiety disorder     Attention deficit hyperactivity disorder (ADHD)     Benign joint hypermobility     Conversion disorder with seizures or convulsions     Developmental expressive writing disorder     History of asthma      delivery     Orthostatic hypotension     Seizure disorder (H)     "  contractions     Encounter for triage in pregnant patient      delivery delivered     Myrtle reports being born 6 weeks prematurely.  She had normal growth and development during infancy.  She reports having dietary protein aversion in late infancy.  She would have symptoms of GI upset and vomiting with excessive protein intake.  She was reportedly diagnosed with OTC at the age of 4-6 years of age at the [facility] following an episode of GI upset following protein intake.  She reports being on a protein restricted diet since then.  She has never taken Citrulline supplements as a child.  Myrtle does not count protein but try to restrict herself from taking too much protein.  She does not report having any clinical complications with her previous pregnancy and childbirth suggestive of a hyperammonemic episode     There is also history of febrile seizure starting early in childhood.  She reports having seizure-like episodes in the absence of fever when she was in middle school.  Each episode would last for 30 seconds to 1-1/2 minutes and this would involve twitching of her extremities and would be followed by postictal phase.  She was apparently evaluated by neurology and was diagnosed with anxiety which were provoking the seizures.  She has been on Zoloft which helps with her anxiety.     She reports having learning issues in school and being diagnosed with ADHD and autism spectrum disorder.     There is also history of multiple female family members on her mother side of the family with a history of protein intolerance and diagnosis of OTC.  It is unclear if anyone has had a molecular testing being done.    She had biochemical testing done following the initial visit which showed normal ammonia, urine orotic acid, CMP and urine organic acid.  Her plasma amino acid showed normal Citrulline level, though it is on the lower end of normal at 1.6 (reference: 1.3-6 umol/dL).  A 3-day diet record that was  obtained showed a dietary intake of 0.75 g/kg/day (DRI: 0.8g/kg/day).Her OTC sequencing and del/dup analysis done on a genome platform did not show any pathogenic/likely path or VUSes in exonic or intronic region.    Interval history:  She has had a C section done for  labor and did not have any complications. Her ammonia remained normal in the peripartum period. She reports continuing a protein restricted diet.  She does not have any questions or concerns during this visit. Myrtle is not currently breastfeeding her son.        Medications:  Current Outpatient Medications   Medication Sig     acetaminophen (TYLENOL) 325 MG tablet Take 2 tablets (650 mg) by mouth every 6 hours as needed for mild pain Start after Delivery.     hydrOXYzine (ATARAX) 25 MG tablet Take 1-2 tablets (25-50 mg) by mouth nightly as needed for itching     ibuprofen (ADVIL/MOTRIN) 600 MG tablet Take 1 tablet (600 mg) by mouth every 6 hours as needed for moderate pain Start after delivery     metoclopramide (REGLAN) 10 MG tablet  (Patient not taking: Reported on 2022)     ondansetron (ZOFRAN) 4 MG tablet ondansetron HCl 4 mg tablet   Take 1 tablet by mouth every 6-8 hours as directed.     oxyCODONE (ROXICODONE) 5 MG tablet Take 0.5 tablets (2.5 mg) by mouth every 6 hours as needed for moderate pain     Prenatal MV & Min w/FA-DHA (PRENATAL ADULT GUMMY/DHA/FA PO) Prenatal Gummies     promethazine (PHENERGAN) 25 MG tablet promethazine 25 mg tablet   Take 1 tablet by mouth every 6-8 hours by oral route.     senna-docusate (SENOKOT-S/PERICOLACE) 8.6-50 MG tablet Take 1 tablet by mouth daily Start after delivery.     sertraline (ZOLOFT) 50 MG tablet Take 1.5 tablets (75 mg) by mouth daily     sertraline (ZOLOFT) 50 MG tablet      No current facility-administered medications for this visit.       Allergies:  Allergies   Allergen Reactions     Guaifenesin Hives and Nausea and Vomiting     Adhesive Tape Other (See Comments)     Burns only with  waterproof bandages. [facility] Tegaderm and tape is okay.       Mushroom      Latex Hives, Itching and Rash         FAMILY HISTORY: A brief family medical history was reviewed.  REVIEW OF SYSTEMS: The review of systems negative for new eye, ear, heart, lung, liver, spleen, gastrointestinal, bone, muscle, integumentary, endocrinologic, brain or psychiatric issues except as noted above.    PHYSICAL EXAMINATION:   General: The patient is oriented to person, place and time at an age-appropriate manner.   HEENT: The facial features are normal and symmetric. The ears are of normal position and configuration and hearing is grossly normal.  Neck: The neck appears to have full range of motion  Chest: Does not appear to be tachypneic or in any respiratory distress.  Heart:  Myrtle Strickland  appears well perfused.  Abdomen: Not examined.  Extremities: The extremities are of normal configuration without contractures nor hyperlaxities.  Back: Not examined.   Integument: The visible part of the integument is of normal appearance without significant changes in pigmentation, birthmarks, or lesions.  Neuromuscular:  Mental Status Exam: Alert, awake. Fully oriented. No dysarthria, no dysphasia. Speech of normal fluency.  Appears to have normal strength.       LABORATORY RESULTS: Laboratory studies from the past year were reviewed.   Latest Reference Range & Units 07/20/22 15:55   Ammonia 10 - 50 umol/L 11      Latest Reference Range & Units 07/20/22 15:55   Orotic Acid Urine 0.20 - 1.50 mmol/mol Cr 1.06      Latest Reference Range & Units 07/20/22 15:55   Arginine 2 - 18 umol/dL 4   Citrulline 1.3 - 6.0 umol/dL 1.6   Glutamine 41 - 86 umol/dL 46   Glycine 13 - 50 umol/dL 18   Isoleucine 4 - 11 umol/dL 4   Leucine 8 - 21 umol/dL 7 (L)   Valine 8 - 46 umol/dL 12   (L): Data is abnormally low  ASSESSMENT:  1. History of clinical diagnosis of OTC deficiency without any evidence supported by biochemical or molecular analysis  2. Normal  OTC sequencing and deletion/duplication analysis (on a genome platform evaluating both exonic and intronic variants)  3. Self restricted protein intake at 0.75 g/kg/day  4. Normal ammonia, urine orotic acid  5. Low normal plasma citrulline that could be attributed to the protein restriction   6. In females, diagnosis of OTC cannot be done just based on the biochemical parameters since it may be normal  7. As per the literature, 10-15% of patients with OTC may not have a molecular diagnosis. Recent articles looking into the possibility of intronic variants vs modifying factors vs alternate genes in these 10-15% of the patients are not available    PLAN/RECOMMENDATIONS:  1. At this time, we do not have any definite evidence of OTC in Myrtle from biochemical or molecular analysis. However, with the strong clinical history of hyperammonemia and protein intolerance, the possibility of Myrtle being in the 10-15% described above cannot be ruled out  2. Repeat plasma amino acid given the low normal citrulline last time along with an ammonia  3. An emergency letter and surgery/labor and delivery recommendation letter will be placed in the chart   4. We will see Myrtle back in the clinic as an in person visit in 1 year or sooner with new concerns       With warmest regards,       Suzy June MD     Division of Genetics and Metabolism    Appointments: [phone]      Monday afternoons: Metabolic/Lysosomal storage clinic              [facility]: [phone]/ [phone]               [facility] laboratory: [phone]    Nurse Coordinator, Metabolism and Genetics:  Carrie Crystal RN, [phone]    Pharmacotherapy Consultant:  Wilfrido Fleming, PharmD, Pharmacotherapy for Metabolic Disorders (PIMD): [phone]    Genetic Counselor:  Sahra Aguayo MS, [facility] (Genetic test Results): [phone]    Metabolic Dietician:  Suzette Barrera, Registered Dietician:  [phone]    [facility] Clinic Scheduler:  Esther Suarez, [phone]    Copies to:      Provider Not In System       Myrtle Strickland  [address]  [place] [address]    Dr. Grant referring provider defined for this encounter.      45 minutes spent on the date of the encounter doing chart review, history and exam, documentation and further activities per the note        Please do not hesitate to contact me if you have any questions/concerns.     Sincerely,       Suzy June MD

## 2023-01-05 NOTE — PROGRESS NOTES
"Myrtle Strickland  is being evaluated via a billable video visit.      How would you like to obtain your AVS? [employer]  For the video visit, send the invitation by: Text to cell phone: [phone]  Will anyone else be joining your video visit? No        Video-Visit Details    Type of service:  Video Visit    Video Start Time: 3:00 PM  Video End Time (time video stopped): 3:20 PM  Originating Location (pt. Location): Home    Distant Location (provider location):  [facility]    Mode of Communication:  Video Conference via [facility]                       OUTPATIENT METABOLIC FOLLOW UP          Date: 2023      Patient:  Myrtle Strickland   :   1995   MRN:     [id number]      Myrtle Strickland  [address]  [facility] [address]    Dear Dr. 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)  No definite biochemical evidence    PAST MEDICAL HISTORY:    From the oral history, and medical records that are available, these items are noted:    Patient Active Problem List   Diagnosis     Asperger's disorder     Anxiety disorder     Attention deficit hyperactivity disorder (ADHD)     Benign joint hypermobility     Conversion disorder with seizures or convulsions     Developmental expressive writing disorder     History of asthma      delivery     Orthostatic hypotension     Seizure disorder (H)      contractions     Encounter for triage in pregnant patient      delivery delivered     Myrtle reports being born 6 weeks prematurely.  She had normal growth and development during infancy.  She reports having dietary protein aversion in late infancy.  She would have symptoms of GI upset and vomiting with excessive protein intake.  She was " reportedly diagnosed with OTC at the age of 4-6 years of age at the [facility] following an episode of GI upset following protein intake.  She reports being on a protein restricted diet since then.  She has never taken Citrulline supplements as a child.  Myrtle does not count protein but try to restrict herself from taking too much protein.  She does not report having any clinical complications with her previous pregnancy and childbirth suggestive of a hyperammonemic episode     There is also history of febrile seizure starting early in childhood.  She reports having seizure-like episodes in the absence of fever when she was in middle school.  Each episode would last for 30 seconds to 1-1/2 minutes and this would involve twitching of her extremities and would be followed by postictal phase.  She was apparently evaluated by neurology and was diagnosed with anxiety which were provoking the seizures.  She has been on Zoloft which helps with her anxiety.     She reports having learning issues in school and being diagnosed with ADHD and autism spectrum disorder.     There is also history of multiple female family members on her mother side of the family with a history of protein intolerance and diagnosis of OTC.  It is unclear if anyone has had a molecular testing being done.    She had biochemical testing done following the initial visit which showed normal ammonia, urine orotic acid, CMP and urine organic acid.  Her plasma amino acid showed normal Citrulline level, though it is on the lower end of normal at 1.6 (reference: 1.3-6 umol/dL).  A 3-day diet record that was obtained showed a dietary intake of 0.75 g/kg/day (DRI: 0.8g/kg/day).Her OTC sequencing and del/dup analysis done on a genome platform did not show any pathogenic/likely path or VUSes in exonic or intronic region.    Interval history:  She has had a C section done for  labor and did not have any complications. Her ammonia remained normal in the  peripartum period. She reports continuing a protein restricted diet.  She does not have any questions or concerns during this visit. Myrtle is not currently breastfeeding her son.        Medications:  Current Outpatient Medications   Medication Sig     acetaminophen (TYLENOL) 325 MG tablet Take 2 tablets (650 mg) by mouth every 6 hours as needed for mild pain Start after Delivery.     hydrOXYzine (ATARAX) 25 MG tablet Take 1-2 tablets (25-50 mg) by mouth nightly as needed for itching     ibuprofen (ADVIL/MOTRIN) 600 MG tablet Take 1 tablet (600 mg) by mouth every 6 hours as needed for moderate pain Start after delivery     metoclopramide (REGLAN) 10 MG tablet  (Patient not taking: Reported on 9/8/2022)     ondansetron (ZOFRAN) 4 MG tablet ondansetron HCl 4 mg tablet   Take 1 tablet by mouth every 6-8 hours as directed.     oxyCODONE (ROXICODONE) 5 MG tablet Take 0.5 tablets (2.5 mg) by mouth every 6 hours as needed for moderate pain     Prenatal MV & Min w/FA-DHA (PRENATAL ADULT GUMMY/DHA/FA PO) Prenatal Gummies     promethazine (PHENERGAN) 25 MG tablet promethazine 25 mg tablet   Take 1 tablet by mouth every 6-8 hours by oral route.     senna-docusate (SENOKOT-S/PERICOLACE) 8.6-50 MG tablet Take 1 tablet by mouth daily Start after delivery.     sertraline (ZOLOFT) 50 MG tablet Take 1.5 tablets (75 mg) by mouth daily     sertraline (ZOLOFT) 50 MG tablet      No current facility-administered medications for this visit.       Allergies:  Allergies   Allergen Reactions     Guaifenesin Hives and Nausea and Vomiting     Adhesive Tape Other (See Comments)     Burns only with waterproof bandages. [facility] Tegaderm and tape is okay.       Mushroom      Latex Hives, Itching and Rash         FAMILY HISTORY: A brief family medical history was reviewed.  REVIEW OF SYSTEMS: The review of systems negative for new eye, ear, heart, lung, liver, spleen, gastrointestinal, bone, muscle, integumentary, endocrinologic, brain or  psychiatric issues except as noted above.    PHYSICAL EXAMINATION:   General: The patient is oriented to person, place and time at an age-appropriate manner.   HEENT: The facial features are normal and symmetric. The ears are of normal position and configuration and hearing is grossly normal.  Neck: The neck appears to have full range of motion  Chest: Does not appear to be tachypneic or in any respiratory distress.  Heart:  Myrtle Strickland  appears well perfused.  Abdomen: Not examined.  Extremities: The extremities are of normal configuration without contractures nor hyperlaxities.  Back: Not examined.   Integument: The visible part of the integument is of normal appearance without significant changes in pigmentation, birthmarks, or lesions.  Neuromuscular:  Mental Status Exam: Alert, awake. Fully oriented. No dysarthria, no dysphasia. Speech of normal fluency.  Appears to have normal strength.       LABORATORY RESULTS: Laboratory studies from the past year were reviewed.   Latest Reference Range & Units 07/20/22 15:55   Ammonia 10 - 50 umol/L 11      Latest Reference Range & Units 07/20/22 15:55   Orotic Acid Urine 0.20 - 1.50 mmol/mol Cr 1.06      Latest Reference Range & Units 07/20/22 15:55   Arginine 2 - 18 umol/dL 4   Citrulline 1.3 - 6.0 umol/dL 1.6   Glutamine 41 - 86 umol/dL 46   Glycine 13 - 50 umol/dL 18   Isoleucine 4 - 11 umol/dL 4   Leucine 8 - 21 umol/dL 7 (L)   Valine 8 - 46 umol/dL 12   (L): Data is abnormally low  ASSESSMENT:  1. History of clinical diagnosis of OTC deficiency without any evidence supported by biochemical or molecular analysis  2. Normal OTC sequencing and deletion/duplication analysis (on a genome platform evaluating both exonic and intronic variants)  3. Self restricted protein intake at 0.75 g/kg/day  4. Normal ammonia, urine orotic acid  5. Low normal plasma citrulline that could be attributed to the protein restriction   6. In females, diagnosis of OTC cannot be done just based  on the biochemical parameters since it may be normal  7. As per the literature, 10-15% of patients with OTC may not have a molecular diagnosis. Recent articles looking into the possibility of intronic variants vs modifying factors vs alternate genes in these 10-15% of the patients are not available    PLAN/RECOMMENDATIONS:  1. At this time, we do not have any definite evidence of OTC in Myrtle from biochemical or molecular analysis. However, with the strong clinical history of hyperammonemia and protein intolerance, the possibility of Myrtle being in the 10-15% described above cannot be ruled out  2. Repeat plasma amino acid given the low normal citrulline last time along with an ammonia  3. An emergency letter and surgery/labor and delivery recommendation letter will be placed in the chart   4. We will see Myrtle back in the clinic as an in person visit in 1 year or sooner with new concerns       With warmest regards,       Suzy June MD     Division of Genetics and Metabolism    Appointments: [phone]      Monday afternoons: Metabolic/Lysosomal storage clinic              [facility] laboratory: [phone]/ [phone]               [facility] laboratory: [phone]    Nurse Coordinator, Metabolism and Genetics:  Carrie Crystal RN, [phone]    Pharmacotherapy Consultant:  Wilfrido Fleming, PharmD, Pharmacotherapy for Metabolic Disorders (PIMD): [phone]    Genetic Counselor:  Sahra Aguayo MS, [facility] (Genetic test Results): [phone]    Metabolic Dietician:  Suzette Barrera, Registered Dietician: [phone]    [facility] Scheduler:  Esther Suarez, [phone]    Copies to:      Provider Not In System       Myrtle Strickland  [address]  [facility] [address]    Dr. Grant referring provider defined for this encounter.      45 minutes spent on the date of the encounter doing chart review, history and exam, documentation and  further activities per the note     home

## 2023-01-08 ENCOUNTER — HEALTH MAINTENANCE LETTER (OUTPATIENT)
Age: 28
End: 2023-01-08

## 2023-06-07 ENCOUNTER — TRANSFERRED RECORDS (OUTPATIENT)
Dept: HEALTH INFORMATION MANAGEMENT | Facility: CLINIC | Age: 28
End: 2023-06-07
Payer: COMMERCIAL

## 2023-06-07 ENCOUNTER — LAB REQUISITION (OUTPATIENT)
Dept: LAB | Facility: CLINIC | Age: 28
End: 2023-06-07

## 2023-06-07 DIAGNOSIS — Z36.9 ENCOUNTER FOR ANTENATAL SCREENING, UNSPECIFIED: ICD-10-CM

## 2023-06-07 LAB
BASOPHILS # BLD AUTO: 0 10E3/UL (ref 0–0.2)
BASOPHILS NFR BLD AUTO: 0 %
EOSINOPHIL # BLD AUTO: 0.1 10E3/UL (ref 0–0.7)
EOSINOPHIL NFR BLD AUTO: 1 %
ERYTHROCYTE [DISTWIDTH] IN BLOOD BY AUTOMATED COUNT: 13.7 % (ref 10–15)
HCT VFR BLD AUTO: 40.7 % (ref 35–47)
HGB BLD-MCNC: 12.9 G/DL (ref 11.7–15.7)
IMM GRANULOCYTES # BLD: 0 10E3/UL
IMM GRANULOCYTES NFR BLD: 0 %
LYMPHOCYTES # BLD AUTO: 1.3 10E3/UL (ref 0.8–5.3)
LYMPHOCYTES NFR BLD AUTO: 17 %
MCH RBC QN AUTO: 28 PG (ref 26.5–33)
MCHC RBC AUTO-ENTMCNC: 31.7 G/DL (ref 31.5–36.5)
MCV RBC AUTO: 88 FL (ref 78–100)
MONOCYTES # BLD AUTO: 0.7 10E3/UL (ref 0–1.3)
MONOCYTES NFR BLD AUTO: 9 %
NEUTROPHILS # BLD AUTO: 5.9 10E3/UL (ref 1.6–8.3)
NEUTROPHILS NFR BLD AUTO: 73 %
NRBC # BLD AUTO: 0 10E3/UL
NRBC BLD AUTO-RTO: 0 /100
PLATELET # BLD AUTO: 296 10E3/UL (ref 150–450)
RBC # BLD AUTO: 4.61 10E6/UL (ref 3.8–5.2)
WBC # BLD AUTO: 8 10E3/UL (ref 4–11)

## 2023-06-07 PROCEDURE — 87340 HEPATITIS B SURFACE AG IA: CPT | Performed by: NURSE PRACTITIONER

## 2023-06-07 PROCEDURE — 86901 BLOOD TYPING SEROLOGIC RH(D): CPT | Performed by: NURSE PRACTITIONER

## 2023-06-07 PROCEDURE — 87389 HIV-1 AG W/HIV-1&-2 AB AG IA: CPT | Performed by: NURSE PRACTITIONER

## 2023-06-07 PROCEDURE — 80081 OBSTETRIC PANEL INC HIV TSTG: CPT | Performed by: NURSE PRACTITIONER

## 2023-06-07 PROCEDURE — 86850 RBC ANTIBODY SCREEN: CPT | Performed by: NURSE PRACTITIONER

## 2023-06-07 PROCEDURE — 87086 URINE CULTURE/COLONY COUNT: CPT | Performed by: NURSE PRACTITIONER

## 2023-06-07 PROCEDURE — 86762 RUBELLA ANTIBODY: CPT | Performed by: NURSE PRACTITIONER

## 2023-06-07 PROCEDURE — 86803 HEPATITIS C AB TEST: CPT | Performed by: NURSE PRACTITIONER

## 2023-06-08 ENCOUNTER — TRANSCRIBE ORDERS (OUTPATIENT)
Dept: ULTRASOUND IMAGING | Facility: CLINIC | Age: 28
End: 2023-06-08
Payer: COMMERCIAL

## 2023-06-08 ENCOUNTER — MEDICAL CORRESPONDENCE (OUTPATIENT)
Dept: HEALTH INFORMATION MANAGEMENT | Facility: CLINIC | Age: 28
End: 2023-06-08
Payer: COMMERCIAL

## 2023-06-08 DIAGNOSIS — O09.893 HISTORY OF PRETERM DELIVERY, CURRENTLY PREGNANT IN THIRD TRIMESTER: ICD-10-CM

## 2023-06-08 DIAGNOSIS — F41.9 ANXIETY DISORDER, UNSPECIFIED TYPE: ICD-10-CM

## 2023-06-08 DIAGNOSIS — O26.90 PREGNANCY RELATED CONDITION, ANTEPARTUM: Primary | ICD-10-CM

## 2023-06-08 DIAGNOSIS — E72.4 OTC (ORNITHINE TRANSCARBAMYLASE DEFICIENCY) (H): Primary | ICD-10-CM

## 2023-06-08 DIAGNOSIS — G40.909 SEIZURE DISORDER (H): ICD-10-CM

## 2023-06-08 LAB
ABO/RH(D): NORMAL
ANTIBODY SCREEN: NEGATIVE
HBV SURFACE AG SERPL QL IA: NONREACTIVE
HCV AB SERPL QL IA: NONREACTIVE
HIV 1+2 AB+HIV1 P24 AG SERPL QL IA: NONREACTIVE
RPR SER QL: NONREACTIVE
RUBV IGG SERPL QL IA: 0.82 INDEX
RUBV IGG SERPL QL IA: NORMAL
SPECIMEN EXPIRATION DATE: NORMAL

## 2023-06-09 LAB — BACTERIA UR CULT: NORMAL

## 2023-06-21 ENCOUNTER — TELEPHONE (OUTPATIENT)
Dept: MATERNAL FETAL MEDICINE | Facility: CLINIC | Age: 28
End: 2023-06-21
Payer: COMMERCIAL

## 2023-07-07 ENCOUNTER — MEDICAL CORRESPONDENCE (OUTPATIENT)
Dept: HEALTH INFORMATION MANAGEMENT | Facility: CLINIC | Age: 28
End: 2023-07-07
Payer: COMMERCIAL

## 2023-07-07 ENCOUNTER — TRANSCRIBE ORDERS (OUTPATIENT)
Dept: MATERNAL FETAL MEDICINE | Facility: CLINIC | Age: 28
End: 2023-07-07
Payer: COMMERCIAL

## 2023-07-07 ENCOUNTER — TRANSFERRED RECORDS (OUTPATIENT)
Dept: HEALTH INFORMATION MANAGEMENT | Facility: CLINIC | Age: 28
End: 2023-07-07
Payer: COMMERCIAL

## 2023-07-07 DIAGNOSIS — O26.90 PREGNANCY RELATED CONDITION, ANTEPARTUM: Primary | ICD-10-CM

## 2023-07-10 DIAGNOSIS — O26.90 PREGNANCY RELATED CONDITION, ANTEPARTUM: ICD-10-CM

## 2023-07-10 DIAGNOSIS — O09.893 HISTORY OF PRETERM DELIVERY, CURRENTLY PREGNANT IN THIRD TRIMESTER: Primary | ICD-10-CM

## 2023-07-10 DIAGNOSIS — E72.4 OTC (ORNITHINE TRANSCARBAMYLASE DEFICIENCY) (H): ICD-10-CM

## 2023-07-10 DIAGNOSIS — Z82.79 FAMILY HISTORY OF CONGENITAL OR GENETIC CONDITION: ICD-10-CM

## 2023-07-24 ENCOUNTER — TRANSFERRED RECORDS (OUTPATIENT)
Dept: HEALTH INFORMATION MANAGEMENT | Facility: CLINIC | Age: 28
End: 2023-07-24
Payer: COMMERCIAL

## 2023-07-24 ENCOUNTER — PRE VISIT (OUTPATIENT)
Dept: MATERNAL FETAL MEDICINE | Facility: CLINIC | Age: 28
End: 2023-07-24
Payer: COMMERCIAL

## 2023-07-27 ENCOUNTER — OFFICE VISIT (OUTPATIENT)
Dept: MATERNAL FETAL MEDICINE | Facility: CLINIC | Age: 28
End: 2023-07-27
Attending: OBSTETRICS & GYNECOLOGY
Payer: COMMERCIAL

## 2023-07-27 ENCOUNTER — DOCUMENTATION ONLY (OUTPATIENT)
Dept: MATERNAL FETAL MEDICINE | Facility: CLINIC | Age: 28
End: 2023-07-27
Payer: COMMERCIAL

## 2023-07-27 ENCOUNTER — OFFICE VISIT (OUTPATIENT)
Dept: BEHAVIORAL HEALTH | Facility: CLINIC | Age: 28
End: 2023-07-27
Payer: COMMERCIAL

## 2023-07-27 ENCOUNTER — HOSPITAL ENCOUNTER (OUTPATIENT)
Dept: ULTRASOUND IMAGING | Facility: CLINIC | Age: 28
Discharge: HOME | End: 2023-07-27
Attending: OBSTETRICS & GYNECOLOGY
Payer: COMMERCIAL

## 2023-07-27 VITALS
DIASTOLIC BLOOD PRESSURE: 75 MMHG | OXYGEN SATURATION: 98 % | SYSTOLIC BLOOD PRESSURE: 132 MMHG | RESPIRATION RATE: 18 BRPM | HEART RATE: 73 BPM

## 2023-07-27 DIAGNOSIS — O26.90 PREGNANCY RELATED CONDITION, ANTEPARTUM: ICD-10-CM

## 2023-07-27 DIAGNOSIS — O09.893 HISTORY OF PRETERM DELIVERY, CURRENTLY PREGNANT IN THIRD TRIMESTER: Primary | ICD-10-CM

## 2023-07-27 DIAGNOSIS — E72.4 OTC (ORNITHINE TRANSCARBAMYLASE DEFICIENCY) (H): ICD-10-CM

## 2023-07-27 DIAGNOSIS — F41.9 ANXIETY DISORDER, UNSPECIFIED TYPE: ICD-10-CM

## 2023-07-27 DIAGNOSIS — Z82.79 FAMILY HISTORY OF CONGENITAL OR GENETIC CONDITION: ICD-10-CM

## 2023-07-27 DIAGNOSIS — Z31.5 ENCOUNTER FOR PROCREATIVE GENETIC COUNSELING: ICD-10-CM

## 2023-07-27 DIAGNOSIS — O09.893 HISTORY OF PRETERM DELIVERY, CURRENTLY PREGNANT IN THIRD TRIMESTER: ICD-10-CM

## 2023-07-27 DIAGNOSIS — F33.2 SEVERE EPISODE OF RECURRENT MAJOR DEPRESSIVE DISORDER, WITHOUT PSYCHOTIC FEATURES (H): Primary | ICD-10-CM

## 2023-07-27 DIAGNOSIS — E72.4 OTC (ORNITHINE TRANSCARBAMYLASE DEFICIENCY) (H): Primary | ICD-10-CM

## 2023-07-27 DIAGNOSIS — F32.A DEPRESSION, UNSPECIFIED DEPRESSION TYPE: Primary | ICD-10-CM

## 2023-07-27 PROCEDURE — 76801 OB US < 14 WKS SINGLE FETUS: CPT

## 2023-07-27 PROCEDURE — 76801 OB US < 14 WKS SINGLE FETUS: CPT | Mod: 26 | Performed by: OBSTETRICS & GYNECOLOGY

## 2023-07-27 PROCEDURE — 96040 HC GENETIC COUNSELING, EACH 30 MINUTES: CPT

## 2023-07-27 PROCEDURE — 99215 OFFICE O/P EST HI 40 MIN: CPT | Mod: 25 | Performed by: OBSTETRICS & GYNECOLOGY

## 2023-07-27 PROCEDURE — G0463 HOSPITAL OUTPT CLINIC VISIT: HCPCS | Mod: 25 | Performed by: OBSTETRICS & GYNECOLOGY

## 2023-07-27 PROCEDURE — 99207 PR NO BILLABLE SERVICE THIS VISIT: CPT

## 2023-07-27 NOTE — PROGRESS NOTES
"Maternal-Fetal Medicine Consultation    Myrtle Strickland  : 1995  MRN: [id number]    REFERRAL:  Myrtle Strickland is a 28 year old sent by Dr. Good from [facility] for MFM consultation.    HPI:  Myrtle Strickland is a 28 year old  at 13w5d by 6w4d US here for MFM consultation regarding OTC deficiency, history of PTD. She is here with her partner.    Patient reports significant nausea and vomiting in this pregnancy, which was also an issue in her prior pregnancies. She is unable to tolerate water, but reports that she can drink Gatorade. She is interested in home IV fluid infusions, but does not feel like she needs IV fluids at present. She has not presented to the ED for hyperemesis in this pregnancy, due to feeling dismissed by ED providers in the past. Patient reports a history of anorexia and bulimia, and she states her difficulty tolerating PO makes her feel as if she is \"relapsing\", even though she knows intellectually that the pregnancy is causing her symptoms.    Patient also reports worsening of her anxiety and depression in this pregnancy. She feels like Zoloft is not helpful, but states that it is the only medication that she has ever been offered. She denies active SI at present. Her mother is with her the vast majority of time, and her partner is also closely involved. She feels safe at home with this arrangement. She states she would present to the ED or call if she begins to feels unsafe at any time. Patient reports a history of substance use and increased cravings recently. She denies return to use.     Per chart review, patient was previously interested in pursing adoption for this baby. This is an unplanned pregnancy. Her partner is not interested in adoption. She previously declined social work consultation.    Prenatal Care:  Primary OB care this pregnancy has been with Dr. Good from [facility]. Patient is planning to continue prenatal care at [facility] and deliver at [facility] [place].    Obstetrics History:  OB History    Para Term  AB Living   6 2 0 2 3 2   SAB IAB Ectopic Multiple Live Births   3 0 0 0 2      # Outcome Date GA Lbr Karl/2nd Weight Sex Delivery Anes PTL Lv   6 Current            5  22 34w0d  2.18 kg (4 lb 12.9 oz) M CS-LTranv Spinal Y CLARISSA      Name: LYNSEY,MALE-MARINA      Apgar1: 8  Apgar5: 8   4  19 34w5d  2.34 kg (5 lb 2.5 oz) F Vag-Spont   CLARISSA      Name: Papo      Apgar1: 8  Apgar5: 9   3 SAB 13 6w0d          2 SAB  4w0d          1 SAB              Gynecologic History:  - LMP: 23  - Last Pap: NILM (3/21/22)  - Denies any history of abnormal pap smears  - Denies prior cervical surgery or procedures  - Denies any history of frequent UTIs, vaginal infections, or STIs    Past Medical History:  Past Medical History:   Diagnosis Date    ADHD (attention deficit hyperactivity disorder)     Anxiety     Depressive disorder     History of eating disorder     Psychogenic nonepileptic seizure     Rubella non-immune status, antepartum     Uncomplicated asthma      Past Surgical History:  Past Surgical History:   Procedure Laterality Date    AS REMOVAL OF TONSILS,<11 Y/O       SECTION N/A 2022    Procedure:  SECTION;  Surgeon: Gina Pascual MD;  Location:  L+D     Medciations:  sertraline (ZOLOFT) 50 MG tablet    Reported, Patient Yes      Allergies:  Guaifenesin, Adhesive tape, Mushroom, and Latex    Social History:   Reports history of substance use. Denies current use of alcohol, drugs or smoking.    Family History:  Denies history of genetic disorders, preeclampsia, thromboembolic disease, bleeding disorders.    ROS:  10-point ROS negative except as in HPI     PHYSICAL EXAM:  Deferred     ASSESSMENT/PLAN:  Marina Strickland is a 28 year old  at 13w5d by 6w4d US here for [facility] consultation regarding:     # Ornithine transcarbamylase deficiency  Per Dr. June of Genetics and Metabolism, patient  has a history of clinical diagnosis of OTC deficiency without any evidence supported by biochemical or molecular analysis. Her most recent plasma amino acids showed a normal citrulline level, and she has had a normal ammonia level in the past. She has never been on amino acid supplementation. However, per their consult note, 10-15% of patients with OTC deficiency may not have a molecular diagnosis, and the possibility of Ms. Strickland being in this 10-15% cannot be ruled out.     OTC deficiency is an X-linked disorder, meaning there is a 50% chance that a male infant is affected and a 50% chance that a female infant is a carrier. In such cases,  risks associated with delivery include the risk of hyperammonemia.     Most women with OTC deficiency do well in pregnancy, particularly in the setting of an anabolic state (e.g. early gestation). However, these women are at risk for metabolic decompensation during periods of catabolism (e.g. first trimester nausea and vomiting, labor, and the postpartum period). Signs of hyperammonemia include delirium, seizure, or coma. For patients with anemia, it is important to consider that blood transfusion increases protein load and can increase the risk of hyperammonemia.    Recommendations:  - Monthly ultrasound for fetal growth assessment beginning at 24 weeks.  - Weekly BPP beginning at 32 weeks.  - Continue care with Genetics and Metabolism.  - Intrapartum management:  Continuous infusion of IV fluids containing 10% dextrose intrapartum and postpartum. Plan for induction of labor at 39 weeks if patient desires TOLAC. If planned  section, admit the night before to begin infusion. Add insulin drip if needed to maintain euglycemia.  If no enteral intake estimated for >12 hours, or if this is anticipated to occur, please contact Genetics and Metabolism physician on call for other alternative, including IV lipids.  Ammonia levels should be monitored closely. Recommend q6h  ammonia levels at the time of admission. If normal x 3, this could be spaced out as per the recommendations of the on call physician. Continue to monitor ammonia in the post delivery state.  Pre-coordination with Genetics and Metabolism is needed if surgery/anesthesia is required.  Please contact Genetics and Metabolism on call provider for management of the  after delivery. (See letter 22 for detailed plan of care)  - Monitor neurologic symptoms intrapartum and postpartum.  - Breastfeeding is not contraindicated.    # Anxiety  # Depression  Patient was also seen by Gretel Meraz today. Please see her note for details. Dicussed safety plan. She has a therapist that she sees weekly, and she will ask for a referral to a psychiatrist for consideration of alternate medication regimen.    Recommendations:  - Referral to  psychiatry.  - Reviewed safety plan and indications to present to care.    # Hyperemesis gravidarum  We discussed the diagnosis of hyperemesis gravidarum in pregnancy. We again discussed the treatment goals (prevention of severe dehydration/electrolyte abnormalities, improvement of symptoms and minimize/prevent fetal effects). We reviewed standard management of hyperemesis in pregnancy, including the use of pyroxidine, doxylamine, antihistamines and seotonin antagonists. In refractory cases, we will consider other options such as glucocorticoids (methylprednisolone or hydrocortisone IV) or chlorpromazine. We additional discussed enteral and parenteral therapy despite the aforementioned treatments, and the potential maternal and fetal risks associated with these steps. We expressed hope that this is a self-limited condition that will improve with increasing gestational age.    Maternal weight gain and electrolytes should continue to be monitored throughout the pregnancy. Fetal weight and birth weight should not be negatively affected by these events if she is able to appropriately  gain weight in the second half of her pregnancy.    Recommendations:  - Consider home care for IV fluid infusion q3-4 days    # History of  delivery  Today we discussed the incidence and epidemiology of  birth (PTB). There are multiple etiologies of spontaneous PTB, including but not limited to infection, bleeding, uterine distension, cervical insufficiency and stress.  However, most spontaneous  deliveries occur in patients with no risk factors. A history of prior  delivery is a significant risk factor for recurrence in a subsequent pregnancy. Recurrent  delivery has been linked to maternal ethnicity, genitourinary infection, and especially gestational age at the first  delivery: the earlier the delivery, the greater the likelihood of recurrence. Recurrent  birth can happen at the same gestational age as a prior  birth, but it cannot be predicted and it could recur at an earlier gestational age.    We discussed the increased  morbidity and mortality with prematurity which is the rationale for trying to prevent recurrent  birth.  We follow cervical lengths every two from 16 to 24 weeks in women with a history of PTB; an ultrasound indicated cerclage may be considered if shortening is detected prior to 23-24 weeks, given that all cases of cervical insufficiency may not fit into the classic patterns by history.    Recommendations:  - Optimize modifiable risk factors: smoking cessation, avoidance of cocaine, maintenance of adequate nutrition.  - Baseline transvaginal cervical length at 16 weeks' gestation with measurements every 2 weeks through 23 weeks.     - Ultrasound-indicated cerclage should be considered if shortening <25 mm is diagnosed <24 weeks.  - Administration of  corticosteroids if the patient is deemed to be at increased risk of imminent  delivery.  - Urine culture every trimester with aggressive treatment of  bacteriuria.  - Clinical evaluation of  labor symptoms.      The patient was seen and evaluated with Dr. Swenson.    Thank you for allowing us to participate in the care of your patient. Please do not hesitate to contact us if you have further questions regarding the management of your patient.     Mariana Harris MD  OB/GYN PGY-2  2023 1:24 AM    Physician Attestation   I, Shane Swenson MD, saw this patient and agree with the findings and plan of care as documented in the note.      Items personally reviewed/procedural attestation: History, counseling and recommendations. The total time spent in all patient activities on the day of this visit was 45 minutes.    Shane Swenson MD

## 2023-07-27 NOTE — PATIENT INSTRUCTIONS
[facility] EmPATH space (Emergency Psychiatric Assessment, Treatment & Healing)     EmPATH reimagines emergency mental health care by offering adult patients in crisis immediate access to a team of mental health experts in a calming, living-room-style environment. After a quick triage process, patients with mental health or substance use disorder needs are guided to EmPATH. There, they will get compassionate care to stabilize their situation, reducing the likelihood that they will need to be admitted into a hospital's inpatient psychiatric unit.     EmPATH spaces are designed to be soothing and relaxing, with natural light, comfortable seating, and private sensory rooms. Each unit is staffed by a highly skilled team that includes psychiatrists, psychologists, mental health nurses, clinical social workers, and other licensed mental health professionals who will work with each patient to identify needs, administer medications, and begin appropriate treatment and healing.     To be evaluated in the EmPath, please go through the [facility] ED where you will complete a brief medical screen before going to the EmPath unit.     More information about the EmPATH Unit       [facility]   [address] [place] [address]   Use ED entrance

## 2023-07-27 NOTE — PROGRESS NOTES
[facility]  Genetic Counseling Consult    Patient:  Myrtle Strickland YOB: 1995   Date of Service:  23   MRN: [id number]    Myrtle was seen at the [facility] for genetic consultation. The indication for genetic counseling is personal medical history. The patient was accompanied to this visit by their partner, Renan.    The session was conducted in English.      IMPRESSION/ PLAN   1. Myrtle had genetic screening earlier in this pregnancy. Their non-invasive prenatal test was screen negative or low risk for screened conditions     2. During today's MF visit, Myrtle had a genetic counseling session only. Screening and diagnostic testing was discussed and declined.     3. Myrtle had a nuchal translucency ultrasound today. Please see the ultrasound report for further details. Myrtle also had a [facility] consultation today. Please see the consult note for more details.     4. Gretel was seen by Gretel Kuhn with [facility] for psychiatric needs due to reported feelings of suicidal ideation and feelings that she cannot be left alone. See documentation from [facility] for details.    PREGNANCY HISTORY   /Parity:       Myrtle's pregnancy history is significant for:     Three miscarriages each with a different partner: one at 4-6w, two in the second trimester per patient report    A daughter born  at 34w5d vaginally in  with a different partner    A son born  at 34w via  section in  with her current partner    A chemical pregnancy    CURRENT PREGNANCY   Current Age: 28 year old   Age at Delivery: 28 year old  YOLANDA: 2024, by Ultrasound                                   Gestational Age: 13w5d  This pregnancy is a single gestation.   Myrtle reports significant nausea due to hyperemsis gravidarum. She reports weight loss in this pregnancy due to symptoms. She reports a past medical  history of an eating disorder, and feeling distressed that pregnancy symptoms are mirroring this past history. She reports that this is an unplanned pregnancy and that she has significant distress over this. She reports considering surgical options to prevent pregnancy after delivery, like tubal ligation. She reports feeling significantly overwhelmed and depressed during this pregnancy. Is taking sertraline, but says she cannot keep it down and has not previously found this medication effective. She sees a therapist weekly, but does not have a psychiatrist. She was encouraged to request a psychiatrist referral from her therapist for medication management. She reports that she has had stress-induced seizures. She reports that she has had four during this pregnancy. She also reports that she has passed out twice during this pregnancy due to hunger.   Myrtle reports feelings of suicidal ideation during this pregnancy. She also reports feeling scared to be left alone. She says she is scared to be left alone anywhere where there are medications or knives available. She reports feeling safe now as she has a strong support system and is always with her partner or mother. Report was given to Dr. Swenson and Meghan Claros regarding this history and [facility] was contacted for a safety assessment. See note for details. Myrtle's thoughts appeared to be racing during the session.    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 Per general genetics note, biochemical tests and molecular genetic tests do not support a diagnosis of OTC. However, 10-15% of cases do not have a known genetic etiology, so the possibility of Myrtle being in the 10-15% cannot be ruled out. OTC deficiency is an X-linked disorder. We discussed that female carriers may have symptoms due to skewed X-inactivation. OTC is a condition that causes the urea cycle to work atypically. Nitrogen from protein cannot be broken down by the body, so ammonia  "accumulates in the blood. This can lead to symptoms of lethargy, vomiting, Problems with breathing and body temperature regulation also occur. Myrtle is followed with general genetics for this history, and is encouraged to continue to follow with them.       FAMILY HISTORY   A three-generation pedigree was obtained today and is scanned under the \"Media\" tab in Epic. The family history was reported by Myrtle and their partner.    The following significant findings were reported today:     Myrtle's daughter, mother, maternal grandmother, sister, maternal cousin, and maternal cousin-once-removed are all reported to have OTC. Myrtle reports that her son has OTC, but her partner denies this history. Myrtle had a brother who dies shortly after birth who reportedly had OTC.  Myrtle reports that her mother had breast cancer diagnosed at age 11 and 30. We discussed the family history of breast cancer briefly. Cancer most often occurs by chance, however some families seem to develop cancer more frequently than expected. Everyone has a risk to develop cancer, but individuals may be at an increased risk to develop cancer based on their family history. We discussed that breast cancer <50y is rare and can be associated with inherited cancer predisposition syndromes. Genetic counseling is available for cancer syndromes. Cancer family history, even without genetic testing, can change cancer screening recommendations for family members and aid in insurance coverage for access to them as well. The most informative individuals to complete cancer genetic counseling and genetic testing are those with a personal history of cancer or those closely related to the affected individuals. If the family wants more information they can contact the [facility] ([phone]). Physicians can also make referrals at [email or web address] or, if within the [place] " "system, through [facility] referral for \"Cancer Risk Mgmt/Cancer Genetic Counseling\"   Due to Myrtle's family history of breast cancer it may be reasonable to begin early screening mammograms. Screening mammograms are usually not recommended during pregnancy or while breast feeding including up to six months after. Myrtle was encouraged to discuss this family history with her medical provider to ensure that screening begins at an appropriate age.    Myrtle reports that her daughter is suspected to have autism. Some forms of autism spectrum disorders can be associated with specific genetic conditions, where we discussed that approximately 15-20% of individuals who have autism will have an identifiable genetic cause for this history.  However, most often these conditions are due to the combination of genes and environmental factors that can affect development.  Since there is a genetic component to autism spectrum disorders, the recurrence risk for other family members is higher among first-degree relatives of the individual with an autism spectrum disorder (full siblings), and decreases with distance in relationship to other second-degree relatives.  Myrtle reports a family history significant for substance use disorder and mental health concerns. We discussed how mental illnesses are thought to be inherited in a multifactorial fashion, meaning many factors are involved in the development of this condition. These factors usually include both genetic and environmental aspects and a combination of these aspects lead to the condition. Given that there is a genetic component, the couple's children may be at increased risk to develop a mental illness and were encouraged to share this information with their pediatrician and have awareness for early signs and symptoms.     Otherwise, the reported family history is unremarkable for stillbirths, birth defects, intellectual disabilities, and consanguinity.       CARRIER SCREENING "   Expanded carrier screening is available to screen for autosomal recessive conditions and X-linked conditions in a large list of genes. Autosomal recessive conditions happen when a mutation has been inherited from the egg and sperm and include conditions like cystic fibrosis, thalassemia, hearing loss, spinal muscular atrophy, and more. X-linked conditions happen when a mutation has been inherited from the egg and include conditions like fragile X syndrome. [place] screening was also reviewed. About [place]  Screening    As part of our conversation on carrier screening and how common or rare these condition are, we discussed the patient is of  ancestry and the partner is of  ancestry.    The patient has declined the carrier screening options today. They are aware the option will remain, and they can contact us if they would like to pursue screening.We discussed that OTC is on carrier screening, but given Myrtle's negative molecular testing, she is unlikely to screen psoitive onc arrier screening for this condition.       RISK ASSESSMENT FOR CHROMOSOME CONDITIONS   We explained that the risk for fetal chromosome abnormalities increases with maternal age. We discussed specific features of common chromosome abnormalities, including Down syndrome, trisomy 13, trisomy 18, and sex chromosome trisomies.      At age 28 at midtrimester, the risk to have a baby with Down syndrome is 1 in 855.    At age 28 at midtrimester, the risk to have a baby with any chromosome abnormality is 1 in 428.     Myrtle had genetic screening earlier in this pregnancy. Their non-invasive prenatal test was screen negative or low risk for screened conditions     Non-invasive prenatal testing (NIPT) results    Maternal plasma cell-free DNA testing    Screens for fetal trisomy 21, trisomy 13, trisomy 18, and sex chromosome aneuploidy    First trimester ultrasound with nuchal translucency and nasal bone assessment was within normal  alvin Iraheta had a Panorama NIPT test earlier in pregnancy; we reviewed the results today, which are low risk.    The NIPT did include sex chromosome aneuploidies and the result was low risk. The predicted sex is XX, which is typically female.    Given the accuracy of this test, these results greatly decrease the chance for certain fetal chromosome abnormalities    We discussed the limitations of normal NIPT results    Maternal serum AFP only to screen for open neural tube defects (after 15 weeks) is not yet available due to early gestation but could be done after 15 weeks.       GENETIC TESTING OPTIONS   Genetic testing during a pregnancy includes screening and diagnostic procedures.      Screening tests are non-invasive which means no risk to the pregnancy and includes ultrasounds and blood work. The benefits and limitations of screening were reviewed. Screening tests provide a risk assessment (chance) specific to the pregnancy for certain fetal chromosome abnormalities but cannot definitively diagnose or exclude a fetal chromosome abnormality. Follow-up genetic counseling and consideration of diagnostic testing is recommended with any abnormal screening result. Diagnostic testing during a pregnancy is more certain and can test for more conditions. However, the tests do have a risk of miscarriage that requires careful consideration. These tests can detect fetal chromosome abnormalities with greater than 99% certainty. Results can be compromised by maternal cell contamination or mosaicism and are limited by the resolution of current genetic testing technology.     There is no screening or diagnostic test that detects all forms of birth defects or intellectual disability.     We discussed the following screening options:   Non-invasive prenatal testing (NIPT)    Also called cell-free DNA screening because it detects chromosomes from the placenta in the pregnant person's blood    Can be done any time after 10  weeks gestation    Screens for trisomy 21, trisomy 18, trisomy 13, and sex chromosome aneuploidies    Cannot screen for open neural tube defects, maternal serum AFP after 15 weeks is recommended      We discussed the following ultrasound options:  Nuchal translucency (NT) ultrasound    Ultrasound between 59l0t-66d5o that includes nuchal translucency measurement and nasal bone assessments    Nuchal translucency refers to the space at the back of the neck where fluid builds up. All babies at this stage have fluid and there is only concern if there is too much fluid    Nasal bone refers to the small bone in the nose. There is concern for conditions like Down syndrome if the bone cannot be seen at all    This ultrasound can be done as part of first trimester screening, at the same time as another screen (NIPT), at the same time as a CVS, or if the patients does not want genetic screening.    Markers on ultrasound detects about 70% of pregnancies with aneuploidy    Abnormalities on NT ultrasound can also increase the risk for a birth defect, like a heart defect    Comprehensive level II ultrasound (Fetal Anatomy Ultrasound)    Ultrasound done between 18-20 weeks gestation    Screens for major birth defects and markers for aneuploidy (like trisomy 21 and trisomy 18)    Includes looking at the fetus/baby's growth, heart, organs (stomach, kidneys), placenta, and amniotic fluid      We discussed the following diagnostic options:   Chorionic villus sampling (CVS)    Invasive diagnostic procedure done between 10w0d and 13w6d    The procedure collects a small sample from the placenta for the purpose of chromosomal testing and/or other genetic testing    Diagnostic result; more than 99% sensitivity for fetal chromosome abnormalities    Cannot screen for open neural tube defects, maternal serum AFP after 15 weeks is recommended    Amniocentesis    Invasive diagnostic procedure done after 15 weeks gestation    The procedure  collects a small sample of amniotic fluid for the purpose of chromosomal testing and/or other genetic testing    Diagnostic result; more than 99% sensitivity for fetal chromosome abnormalities    Testing for AFP in the amniotic fluid can test for open neural tube defects      It was a pleasure to be involved with Myrtle s care. Face-to-face time of the meeting was 45 minutes.    Emili Aguilar, GC, MS, [facility]  Certified and [place] Licensed Genetic Counselor  [facility]  Maternal Fetal Medicine  Office: [phone]  [facility]: [phone]   Fax: [phone]  [facility]

## 2023-07-27 NOTE — NURSING NOTE
"Myrtle, accompanied by her SO, here in clinic for GC, NT and MFM consult. Report received from Emili Aguilar GC, that pt was reporting that she has suicide ideation and feels she can not be left alone, See GC note. Upon entering room, pt had legs folded up to her chest and appears disheveled. Meds and allergies reviewed, pt states she tries to take her medications but is vomiting too much during the day to keep any medications down. Suggested for pt to try and take medications at night, pt states she vomits all the time and can not take them. Discussed with pt how her mood has been, pt states, \" Not good\". Myrtle explained she has a lot of anxiety and is really stressed, and with pregnancy hormones and young child at home this is a really hard time. Myrtle states this was an unwanted pregnancy. Myrtle and SO appear to have racing thoughts and are hyperverbal throughout the  visit. Writer offered for pt to meet with Gretel Hamm today, pt agreeable. Gretel met with pt and SO, see separate note. Pt states she plans to follow with referring provider during pregnancy. SBAR given to Dr. Swenson. Nafisa Swenson and Steven met with pt, see consult note. Plan for pt to have L2 US, walked pt to scheduling desk. Encouraged pt to go to ED for IV fluids if she is unable to keep food down or if she has any mental health needs. Pt PETR.   Meghan Umanzor RN    "

## 2023-07-27 NOTE — PROGRESS NOTES
[facility] Only, Warm-handoff , Referral - Mental Health, and Same day referral from provider for urgent safety assessment - outpatient plan made    [facility] met with patient (whose partner was present) to assess for mental health/safety concerns. Patient endorsed concerns of worsening anxiety and depression since finding out she was pregnant. She described distress about her vomiting and weight loss mirroring previous eating disorder symptoms (she denied desire to lose weight or engage in disordered eating behaviors currently), and expressed worry that her worsening mental health poses a risk to sobriety. She stated that a few days ago she felt like she could not stay safe if she were left alone, but denied active plans or desire to harm herself today. She identified her children and pregnancy as protective factors and stated she has a reliable support system. Patient stated she sees a therapist weekly, feels comfortable reporting SI with this provider. Expressed desire for medication change as she is on the highest dose of zoloft already.     Patient demonstrated rapid and circumstantial/tangential, but coherent speech. Appearance was disheveled, odor present.     Outpatient plan:  Continue with weekly therapy  Follow through with referral to  psychiatry  Present to [facility] Unit at [facility] if patient feels she is unable to keep herself safe ([facility] will send information about EmPath via mail)  [facility] recommended patient consider attending NA or LIDA meetings for supplemental support as needed    Patient declined offer for referral to higher level of mental health services at this time.

## 2023-08-04 ENCOUNTER — TELEPHONE (OUTPATIENT)
Dept: PSYCHIATRY | Facility: CLINIC | Age: 28
End: 2023-08-04
Payer: COMMERCIAL

## 2023-08-04 NOTE — TELEPHONE ENCOUNTER
PSYCHIATRY CLINIC PHONE INTAKE     SERVICES REQUESTED / INTERESTED IN          Other:  [facility]    Presenting Problem and Brief History                              What would you like to be seen for? (brief description):  Pt was diagnosed with anxiety and depression as a child and PTSD in late highschool. Pt was also diagnosed with ASD and ADHD. Currently taking sertraline 200mg. Pt stated the sertraline has never helped, but the only medication her doctor is comfortable prescribing while she's pregnant. Pt is 16 weeks. Hard for her to keep most food down. Pt would like to work with a med provider throughout her pregnancy.     Have you received a mental health diagnosis? Yes   Which one (s): Anxiety, depression, PTSD, ASD Asperger, and ADHD. Potential BPD  Is there any history of developmental delay?  Yes . Pt was diagnosed with Asperger's between 8 and 10.   Are you currently seeing a mental health provider?  Yes            Who / month last seen:  David Gill, private practice therapist  Do you have mental health records elsewhere?  Yes  Will you sign a release so we can obtain them?  Yes    Have you ever been hospitalized for psychiatric reasons?  Yes  Describe:  Hospitalized at 16 for suicide attempt.     Do you have current thoughts of self-harm?  Yes  - Pt struggles with SI, but hasn't felt an urge to follow-thru in a long time.   Do you currently have thoughts of harming others?  No    Do you have any safety concerns? Yes - Struggles with the thoughts of SI sometimes.   If yes to these, offer to reach out to a  for follow up.      Substance Use History     Do you have any history of alcohol / illicit drug use?  Yes  Describe:  Pt is in recvery from narcotics for 8 years.   Have you ever received treatment for this?  No    Describe:  NA     Social History     Who is the patient's a guardian?  No    Name / number: NA  Have you had an ACT team in last 12 months?  No  Describe: NA   OK to leave a  detailed voicemail?  Yes    Would you be interested in learning more about research opportunities for which you or your child may qualify? We can connect you with a team member for more information.  Yes  If yes, send an inbasket message to Liliana Fall    Medical/ Surgical History                                   Patient Active Problem List   Diagnosis    Asperger's disorder    Anxiety disorder    Attention deficit hyperactivity disorder (ADHD)    Benign joint hypermobility    Conversion disorder with seizures or convulsions    Developmental expressive writing disorder    History of asthma     delivery    Orthostatic hypotension    Seizure disorder (H)     contractions    Encounter for triage in pregnant patient     delivery delivered          Medications             Have you taken >3 psychiatric medications in your past?  No  Do you currently take 5 or more medications, including prescriptions, supplements, and other over the counter products?  No    If YES to at least one of these questions:   As part of your evaluation in our clinic, we have specially trained pharmacists as part of your care team. Your provider would like for you to meet with one of our pharmacists to review your current and past medications, ensure your med list is up to date, and queue up any questions or concerns you have about medications. They will review all of your medications, not just for mental health, to help ensure you know what you re taking and that everything is working together.     Please schedule patient in [facility] PSYCHIATRY (Kristy Hunt or Bethany Castaneda) for 60m MTM in any green space as virtual (video), telephone, or in person (designated in person days per Epic templates).  -Appt notes can say  Psych eval on xx/xx   -Route telephone encounter to the pharmacist who will be seeing the patient.  If patient has questions about insurance coverage or billing, please still schedule the visit and  refer them to call the [facility] coordinators at [phone].    Current Outpatient Medications   Medication Sig Dispense Refill    acetaminophen (TYLENOL) 325 MG tablet Take 2 tablets (650 mg) by mouth every 6 hours as needed for mild pain Start after Delivery. (Patient not taking: Reported on 7/27/2023) 100 tablet 0    hydrOXYzine (ATARAX) 25 MG tablet Take 1-2 tablets (25-50 mg) by mouth nightly as needed for itching (Patient not taking: Reported on 1/5/2023) 30 tablet 0    ibuprofen (ADVIL/MOTRIN) 600 MG tablet Take 1 tablet (600 mg) by mouth every 6 hours as needed for moderate pain Start after delivery (Patient not taking: Reported on 7/27/2023) 60 tablet 0    metoclopramide (REGLAN) 10 MG tablet  (Patient not taking: Reported on 9/8/2022)      ondansetron (ZOFRAN) 4 MG tablet ondansetron HCl 4 mg tablet   Take 1 tablet by mouth every 6-8 hours as directed. (Patient not taking: Reported on 1/5/2023)      oxyCODONE (ROXICODONE) 5 MG tablet Take 0.5 tablets (2.5 mg) by mouth every 6 hours as needed for moderate pain (Patient not taking: Reported on 1/5/2023) 3 tablet 0    Prenatal MV & Min w/FA-DHA (PRENATAL ADULT GUMMY/DHA/FA PO) Prenatal Gummies (Patient not taking: Reported on 1/5/2023)      promethazine (PHENERGAN) 25 MG tablet promethazine 25 mg tablet   Take 1 tablet by mouth every 6-8 hours by oral route. (Patient not taking: Reported on 1/5/2023)      senna-docusate (SENOKOT-S/PERICOLACE) 8.6-50 MG tablet Take 1 tablet by mouth daily Start after delivery. (Patient not taking: Reported on 1/5/2023) 100 tablet 0    sertraline (ZOLOFT) 50 MG tablet Take 1.5 tablets (75 mg) by mouth daily 60 tablet 1    sertraline (ZOLOFT) 50 MG tablet            DISPOSITION      8/4/23 Intake complete. Scheduled for JASON diaz/  on 8/23/23.     Gracy Salomon Sr., Lead
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
## 2023-08-23 ENCOUNTER — VIRTUAL VISIT (OUTPATIENT)
Dept: PSYCHIATRY | Facility: CLINIC | Age: 28
End: 2023-08-23
Attending: STUDENT IN AN ORGANIZED HEALTH CARE EDUCATION/TRAINING PROGRAM
Payer: COMMERCIAL

## 2023-08-23 ENCOUNTER — TELEPHONE (OUTPATIENT)
Dept: PSYCHIATRY | Facility: CLINIC | Age: 28
End: 2023-08-23
Payer: COMMERCIAL

## 2023-08-23 DIAGNOSIS — F43.9 TRAUMA AND STRESSOR-RELATED DISORDER: ICD-10-CM

## 2023-08-23 DIAGNOSIS — F33.1 MAJOR DEPRESSIVE DISORDER, RECURRENT EPISODE, MODERATE (H): Primary | ICD-10-CM

## 2023-08-23 PROBLEM — G43.909 MIGRAINE: Status: ACTIVE | Noted: 2023-08-23

## 2023-08-23 PROBLEM — R51.9 HEADACHE: Status: ACTIVE | Noted: 2023-08-23

## 2023-08-23 PROBLEM — F41.8 MIXED ANXIETY AND DEPRESSIVE DISORDER: Status: ACTIVE | Noted: 2022-10-05

## 2023-08-23 PROBLEM — R56.9 SEIZURES (H): Status: ACTIVE | Noted: 2019-03-31

## 2023-08-23 PROCEDURE — 90792 PSYCH DIAG EVAL W/MED SRVCS: CPT | Mod: VID | Performed by: STUDENT IN AN ORGANIZED HEALTH CARE EDUCATION/TRAINING PROGRAM

## 2023-08-23 RX ORDER — ESCITALOPRAM OXALATE 10 MG/1
TABLET ORAL
Qty: 24 TABLET | Refills: 0 | Status: SHIPPED | OUTPATIENT
Start: 2023-08-23 | End: 2023-10-18

## 2023-08-23 ASSESSMENT — ANXIETY QUESTIONNAIRES
IF YOU CHECKED OFF ANY PROBLEMS ON THIS QUESTIONNAIRE, HOW DIFFICULT HAVE THESE PROBLEMS MADE IT FOR YOU TO DO YOUR WORK, TAKE CARE OF THINGS AT HOME, OR GET ALONG WITH OTHER PEOPLE: EXTREMELY DIFFICULT
6. BECOMING EASILY ANNOYED OR IRRITABLE: NEARLY EVERY DAY
3. WORRYING TOO MUCH ABOUT DIFFERENT THINGS: NEARLY EVERY DAY
GAD7 TOTAL SCORE: 19
2. NOT BEING ABLE TO STOP OR CONTROL WORRYING: NEARLY EVERY DAY
7. FEELING AFRAID AS IF SOMETHING AWFUL MIGHT HAPPEN: MORE THAN HALF THE DAYS
5. BEING SO RESTLESS THAT IT IS HARD TO SIT STILL: NEARLY EVERY DAY
1. FEELING NERVOUS, ANXIOUS, OR ON EDGE: MORE THAN HALF THE DAYS
4. TROUBLE RELAXING: NEARLY EVERY DAY
GAD7 TOTAL SCORE: 19

## 2023-08-23 ASSESSMENT — PATIENT HEALTH QUESTIONNAIRE - PHQ9
10. IF YOU CHECKED OFF ANY PROBLEMS, HOW DIFFICULT HAVE THESE PROBLEMS MADE IT FOR YOU TO DO YOUR WORK, TAKE CARE OF THINGS AT HOME, OR GET ALONG WITH OTHER PEOPLE: EXTREMELY DIFFICULT
SUM OF ALL RESPONSES TO PHQ QUESTIONS 1-9: 20
SUM OF ALL RESPONSES TO PHQ QUESTIONS 1-9: 20

## 2023-08-23 NOTE — PATIENT INSTRUCTIONS
"Treatment Plan  -Start Lexapro 5mg for 7 days, then increase to 10mg daily    -Continue individual psychotherapy    -If current suicide ideations ever become more active with an intent or plan, please call 911 or the clinic at [phone] during business hours (8-5:00 M-F) or [phone] after hours or on the weekends.  **For additional crisis resources, please see the information at the end of this document**     -RTC in 2 weeks      To find additional local resources, refer to Postpartum Support International (PSI). Available at: http://www.postpartum.net/get-help/locations/united-[facility]/  -List of [place] hospitals according to the [facility] at: www.womensmentalhealth.org is a good resource for information about psychiatric medication in pregnancy/lactation  -Mother To Baby A service of the nonprofit Organization of [employer] (DEYANIRA), provides evidence based information online and in printable handouts to provide patients and providers regarding medications and other exposures during pregnancy and lactation Available at: https://mothertobaby.org/fact-sheets-parent/.  -The 4th Trimester Project - Expert written resources and information for mothers and families. Available at: https://[employer].Cellmemore/  -Consider using \"The Pregnancy and Postpartum Anxiety Workbook,\" by Telma De Los Santos PhD and Denny Myers PsyD.    Postpartum Planning Tools:  Maternal Wellbeing Plan from [facility]: https://www.health.[facility].[place]/people/womeninfants/pmad/pmadsfs.html    4th Trimester Postpartum plan: https://[employer].Cellmemore/mypostpartumplan    Tips for partners:  http://www.postpartum.net/family/tips-for-postpartum-dads-and-partners/        MENTAL HEALTH CRISIS RESOURCES:  For a emergency help, please call 911 or go to the nearest Emergency Department.     Emergency Walk-In Options:   [facility] ([place]): [phone] - Specialized mental health emergency area designed to be calming  [facility] " [place] ([place]): [phone]  [facility] ([place]): [phone]  [facility] ([place]): [phone]    [facility] Crisis Information:   [place]: [phone]  [place]: [phone]  [place] ([place]) - Adult: [phone]     Child: [phone]  [place] - Adult: [phone]     Child: [phone]  [place]: [phone]  List of all [facility] resources:   https://[place].gov/dhs/people-we-serve/adults/health-care/mental-health/resources/crisis-contacts.jsp    National Crisis Information:   Crisis Text Line: Text  [place]  to 658092  Suicide & Crisis Lifeline: 988  National Suicide Prevention Lifeline: 4-213-857-TALK (1-158.336.1230)       For online chat options, visit https://suicidepreventionlifeline.org/chat/  Poison Control Center: 0-977-655-3592  Trans Lifeline: 5-477-542-1810 - Hotline for transgender people of all ages  The Abdullahi Project: 4-401-404-3616 - Hotline for LGBT youth     For Non-Emergency Support:   Fast Tracker: Mental Health & Substance Use Disorder Resources -   https://www.[employer].org/

## 2023-08-23 NOTE — TELEPHONE ENCOUNTER
[facility] Call Center    Phone Message    May a detailed message be left on voicemail: yes     Reason for Call: Other: The pharmacy called to get clarification on the patient's prescription for escitalopram. The direction do not match the dosage, it should be 26 tablets not 24 if the directions are correct.      Please call back at [phone].     Action Taken: Other: Nurses.     Travel Screening: Not Applicable

## 2023-08-23 NOTE — NURSING NOTE
Is the patient currently in the state of [place]? YES    Visit mode:VIDEO    If the visit is dropped, the patient can be reconnected by: VIDEO VISIT: Send to e-mail at: [email or web address]    Will anyone else be joining the visit? NO  (If patient encounters technical issues they should call [phone]    How would you like to obtain your AVS? MyChart    Are changes needed to the allergy or medication list? Pt stated no changes to allergies and Pt stated no med changes    Reason for visit: MATY OBRIENF

## 2023-08-23 NOTE — TELEPHONE ENCOUNTER
Odebunmi, Tolulope Oluwadamilola, MD  You19 minutes ago (4:53 PM)     TO  Ollie Fernandes,  Yes, it's for a 30-day supply so 26 tabs. Sorry my math was off.    Thank you.     Reached out to [facility] pharmacy and spoke with pharmacist who agreed to change the Lexapro quantity from 24 tabs to 26 tabs.     No further action needed by this writer

## 2023-08-25 ENCOUNTER — TELEPHONE (OUTPATIENT)
Dept: PSYCHIATRY | Facility: CLINIC | Age: 28
End: 2023-08-25
Payer: COMMERCIAL

## 2023-08-25 NOTE — TELEPHONE ENCOUNTER
MTM referral from: [facility] visit (referral by provider)    MTM referral outreach attempt #2 on August 25, 2023 at 10:08 AM      Outcome: Patient not reachable after several attempts, will route to [facility] Pharmacist/Provider as an FYI.  [facility] scheduling number is [phone].  Thank you for the referral.    Use blue plus ma for the carrier/Plan on the flowsheet    ALFONSO Grant

## 2023-09-01 ENCOUNTER — TELEPHONE (OUTPATIENT)
Dept: PSYCHIATRY | Facility: CLINIC | Age: 28
End: 2023-09-01
Payer: COMMERCIAL

## 2023-09-01 NOTE — TELEPHONE ENCOUNTER
On 8/25/23 the patient signed an ANUPAM authorizing the release of all pertinent records from [facility] to [facility] for the purpose of continuing care.  I faxed this ANUPAM to [phone] on 9/1/23, stamped that it was faxed, dated and sent to scanning. I held a copy in clinic until scanning complete/confirmed. Gina PARIS

## 2023-09-05 NOTE — PROGRESS NOTES
Encounter note    Myrtle arrived at her appointment but had technological issues and couldn't be checked in by virtual facilitator. Instead they were only able to communicate via [employer] text. Myrtle expressed via the chat function that she couldn't transition the visit to telephone because she didn't have a phone and didn't have access to any additional devices to use.    As the virtual facilitator was trying to discuss if a different time would work better, Myrtle ended the [employer]well call. It's likely this might have been due to technological issues.    Patient has a history of chronic SI. In PHQ-9 filled for this visit, patient answered Q9((thoughts you would be better off dead or of hurting yourself in some way) as 'several days'. Though indicated 'no' to the branch suicidal thoughts of SI/self harm in the past two weeks or concerns for personal safety or safety of others.    Writer tried to call Myrtle but it went to her voice mail instead. So a [employer] message was sent instead. Based on patient's answers and history of chronic SI, a safety escalation isn't warranted at this time. Based on stressors present in patient's life and ongoing medication changes, close follow up and monitoring is warranted.    Plan  -Have RN partner call patient for a check in early next week(Monday) and leave a message via A.P [employer] Silk if patient isn't reachable via telephone.  -Reschedule patient ASAP. Clinic has reached out to patient.    GURINDER Porter,MPH

## 2023-09-08 ENCOUNTER — VIRTUAL VISIT (OUTPATIENT)
Dept: PSYCHIATRY | Facility: CLINIC | Age: 28
End: 2023-09-08
Attending: STUDENT IN AN ORGANIZED HEALTH CARE EDUCATION/TRAINING PROGRAM
Payer: COMMERCIAL

## 2023-09-08 DIAGNOSIS — F33.1 MAJOR DEPRESSIVE DISORDER, RECURRENT EPISODE, MODERATE (H): Primary | ICD-10-CM

## 2023-09-08 ASSESSMENT — PATIENT HEALTH QUESTIONNAIRE - PHQ9
SUM OF ALL RESPONSES TO PHQ QUESTIONS 1-9: 17
SUM OF ALL RESPONSES TO PHQ QUESTIONS 1-9: 17
10. IF YOU CHECKED OFF ANY PROBLEMS, HOW DIFFICULT HAVE THESE PROBLEMS MADE IT FOR YOU TO DO YOUR WORK, TAKE CARE OF THINGS AT HOME, OR GET ALONG WITH OTHER PEOPLE: EXTREMELY DIFFICULT

## 2023-09-11 ENCOUNTER — TELEPHONE (OUTPATIENT)
Dept: PSYCHIATRY | Facility: CLINIC | Age: 28
End: 2023-09-11

## 2023-09-11 NOTE — TELEPHONE ENCOUNTER
Dena Alexander, RN  Dena Alexander, RN; Jamie Whitney, SHAMA; Belgica Ferris RN  Check in on Monday  Received: Today  Odebunmi, Tolulope Oluwadamilola, MD P Psychiatry Nurse-[facility]  Hello everyone,  I was meant to have a visit with Myrtle today but we weren't able to connect due to technological challenges on her end. The VF was unable to check her in. I also tried calling her but it went to her voice mail so I sent her a [employer] message. She's a patient that I have met once but quite worried about her.    Please could someone help to give her a call on Monday? The call is a general check-in and to screen for safety. I have also asked the clinic to help reschedule her ASAP.    Thank you,  [place]am    Follow up:     Reached out to patient as requested by provider. No answer at number provided. LVM, requesting a call back. Clinic number provided.

## 2023-09-20 ENCOUNTER — TRANSFERRED RECORDS (OUTPATIENT)
Dept: HEALTH INFORMATION MANAGEMENT | Facility: CLINIC | Age: 28
End: 2023-09-20
Payer: COMMERCIAL

## 2023-09-23 ENCOUNTER — HEALTH MAINTENANCE LETTER (OUTPATIENT)
Age: 28
End: 2023-09-23

## 2023-09-25 ENCOUNTER — TELEPHONE (OUTPATIENT)
Dept: OBGYN | Facility: CLINIC | Age: 28
End: 2023-09-25
Payer: COMMERCIAL

## 2023-09-25 ENCOUNTER — OFFICE VISIT (OUTPATIENT)
Dept: MATERNAL FETAL MEDICINE | Facility: CLINIC | Age: 28
End: 2023-09-25
Attending: OBSTETRICS & GYNECOLOGY
Payer: COMMERCIAL

## 2023-09-25 ENCOUNTER — HOSPITAL ENCOUNTER (OUTPATIENT)
Dept: ULTRASOUND IMAGING | Facility: CLINIC | Age: 28
Discharge: HOME | End: 2023-09-25
Attending: OBSTETRICS & GYNECOLOGY
Payer: COMMERCIAL

## 2023-09-25 DIAGNOSIS — O99.282 ENDOCRINE, NUTRITIONAL AND METABOLIC DISEASES COMPLICATING PREGNANCY, SECOND TRIMESTER: Primary | ICD-10-CM

## 2023-09-25 DIAGNOSIS — O09.892 HISTORY OF PRETERM DELIVERY, CURRENTLY PREGNANT IN SECOND TRIMESTER: ICD-10-CM

## 2023-09-25 DIAGNOSIS — O09.893 HISTORY OF PRETERM DELIVERY, CURRENTLY PREGNANT IN THIRD TRIMESTER: ICD-10-CM

## 2023-09-25 DIAGNOSIS — E72.4 OTC (ORNITHINE TRANSCARBAMYLASE DEFICIENCY) (H): ICD-10-CM

## 2023-09-25 DIAGNOSIS — Z82.79 FAMILY HISTORY OF CONGENITAL OR GENETIC CONDITION: ICD-10-CM

## 2023-09-25 PROCEDURE — 76811 OB US DETAILED SNGL FETUS: CPT

## 2023-09-25 PROCEDURE — 76817 TRANSVAGINAL US OBSTETRIC: CPT | Mod: 26 | Performed by: OBSTETRICS & GYNECOLOGY

## 2023-09-25 PROCEDURE — 99215 OFFICE O/P EST HI 40 MIN: CPT | Mod: 25 | Performed by: OBSTETRICS & GYNECOLOGY

## 2023-09-25 PROCEDURE — 76811 OB US DETAILED SNGL FETUS: CPT | Mod: 26 | Performed by: OBSTETRICS & GYNECOLOGY

## 2023-09-26 NOTE — PROGRESS NOTES
"Please see \"Imaging\" tab under \"Chart Review\" for details of today's visit.    Blue Michelle    "

## 2023-09-27 NOTE — TELEPHONE ENCOUNTER
Second attempt made to reach out to patient to obtain an update. No answer at number provided. LVM, requesting a call back. Clinic number provided.

## 2023-10-16 ENCOUNTER — TRANSFERRED RECORDS (OUTPATIENT)
Dept: HEALTH INFORMATION MANAGEMENT | Facility: CLINIC | Age: 28
End: 2023-10-16
Payer: COMMERCIAL

## 2023-10-16 PROBLEM — Z28.39 RUBELLA NON-IMMUNE STATUS, ANTEPARTUM: Status: ACTIVE | Noted: 2023-10-16

## 2023-10-16 PROBLEM — Z86.2 HISTORY OF ANEMIA: Status: ACTIVE | Noted: 2023-10-16

## 2023-10-16 PROBLEM — R10.84 GENERALIZED ABDOMINAL PAIN: Status: ACTIVE | Noted: 2022-09-16

## 2023-10-16 PROBLEM — Z87.51 HISTORY OF PREMATURE DELIVERY: Status: ACTIVE | Noted: 2023-10-16

## 2023-10-16 PROBLEM — J45.909 ASTHMA: Status: ACTIVE | Noted: 2023-10-16

## 2023-10-16 PROBLEM — M54.9 CHRONIC BACK PAIN: Status: ACTIVE | Noted: 2023-10-16

## 2023-10-16 PROBLEM — Z36.89 ENCOUNTER FOR TRIAGE IN PREGNANT PATIENT: Status: RESOLVED | Noted: 2022-09-17 | Resolved: 2023-10-16

## 2023-10-16 PROBLEM — O26.899 RH NEGATIVE STATE IN ANTEPARTUM PERIOD: Status: ACTIVE | Noted: 2023-10-16

## 2023-10-16 PROBLEM — R10.84 GENERALIZED ABDOMINAL PAIN: Status: RESOLVED | Noted: 2022-09-16 | Resolved: 2023-10-16

## 2023-10-16 PROBLEM — F41.9 ANXIETY: Status: ACTIVE | Noted: 2023-10-16

## 2023-10-16 PROBLEM — O47.00 PRETERM CONTRACTIONS: Status: RESOLVED | Noted: 2022-08-24 | Resolved: 2023-10-16

## 2023-10-16 PROBLEM — Z87.898 HISTORY OF SUBSTANCE USE DISORDER: Status: ACTIVE | Noted: 2023-10-16

## 2023-10-16 PROBLEM — G89.29 CHRONIC BACK PAIN: Status: ACTIVE | Noted: 2023-10-16

## 2023-10-16 PROBLEM — E72.4: Status: ACTIVE | Noted: 2022-10-05

## 2023-10-16 PROBLEM — O09.899 RUBELLA NON-IMMUNE STATUS, ANTEPARTUM: Status: ACTIVE | Noted: 2023-10-16

## 2023-10-16 PROBLEM — Z67.91 RH NEGATIVE STATE IN ANTEPARTUM PERIOD: Status: ACTIVE | Noted: 2023-10-16

## 2023-10-16 PROBLEM — O20.9 VAGINAL BLEEDING BEFORE 22 WEEKS GESTATION: Status: ACTIVE | Noted: 2023-08-30

## 2023-10-17 ENCOUNTER — VIRTUAL VISIT (OUTPATIENT)
Dept: OBGYN | Facility: CLINIC | Age: 28
End: 2023-10-17
Attending: ADVANCED PRACTICE MIDWIFE
Payer: COMMERCIAL

## 2023-10-17 DIAGNOSIS — Z87.898 HISTORY OF SUBSTANCE USE: ICD-10-CM

## 2023-10-17 DIAGNOSIS — O21.0 HYPEREMESIS GRAVIDARUM: ICD-10-CM

## 2023-10-17 DIAGNOSIS — F31.9 BIPOLAR DISORDER (H): ICD-10-CM

## 2023-10-17 DIAGNOSIS — F32.A DEPRESSIVE DISORDER: ICD-10-CM

## 2023-10-17 DIAGNOSIS — Z86.59 HISTORY OF EATING DISORDER: ICD-10-CM

## 2023-10-17 DIAGNOSIS — F90.9 ADHD (ATTENTION DEFICIT HYPERACTIVITY DISORDER): Primary | ICD-10-CM

## 2023-10-17 DIAGNOSIS — J45.909 UNCOMPLICATED ASTHMA: ICD-10-CM

## 2023-10-17 DIAGNOSIS — E72.4 OTC (ORNITHINE TRANSCARBAMYLASE DEFICIENCY) (H): ICD-10-CM

## 2023-10-17 PROBLEM — F44.5 PSYCHOGENIC NONEPILEPTIC SEIZURE: Status: ACTIVE | Noted: 2019-03-31
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
## 2023-10-17 NOTE — PATIENT INSTRUCTIONS
Thank you for trusting us with your care!     If you need to contact us for questions about:  Symptoms, Scheduling & Medical Questions; Non-urgent (2-3 day response) Loangabrielle message, Urgent (needing response today) [phone] (if after 3:30pm next day response)   Prescriptions: Please call your Pharmacy   Billing: Monica [phone] or [facility]:[phone]   Weeks 22 to 26 of Your Pregnancy: Care Instructions  Your baby's lungs are getting ready for breathing. Your baby may respond to your voice. Your baby likely turns less, and kicks or jerks more. Jerking may mean that your baby has hiccups.    Think about taking childbirth classes. And start to think about whether you want to have pain medicine during labor.   At your next doctor visit, you may be tested for anemia and for high blood sugar that first occurs during pregnancy (gestational diabetes). These conditions can cause problems for you and your baby.     To ease discomfort, such as back pain    Change your position often. Try not to sit or stand for too long.  Get some exercise. Things like walking or stretching may help.  Try using a heating pad or cold pack.    To ease or reduce swelling in your feet, ankles, hands, and fingers    Take off your rings.  Avoid high-sodium foods, such as potato chips.  Prop up your feet, and sleep with pillows under your feet.  Try to avoid standing for long periods of time.  Do not wear tight shoes.  Wear support stockings.  Kegel exercises to prevent urine from leaking    Squeeze your muscles as if you were trying not to pass gas. Your belly, legs, and buttocks shouldn't move. Hold the squeeze for 3 seconds, then relax for 5 to 10 seconds.    Add 1 second each week until you can squeeze for 10 seconds. Repeat the exercise 10 times a session. Do 3 to 8 sessions a day. If these exercises cause you pain, stop doing them and talk with your doctor.  Follow-up care is a key part of your treatment and safety. Be sure to  "make and go to all appointments, and call your doctor if you are having problems. It's also a good idea to know your test results and keep a list of the medicines you take.  Where can you learn more?  Go to https://www.[employer].net/patiented  Enter G264 in the search box to learn more about \"Weeks 22 to 26 of Your Pregnancy: Care Instructions.\"  Current as of: November 9, 2022               Content Version: 13.7    6196-0578 [employer].   Care instructions adapted under license by your healthcare professional. If you have questions about a medical condition or this instruction, always ask your healthcare professional. [employer] disclaims any warranty or liability for your use of this information.      Learning About Screening for Gestational Diabetes  What is gestational diabetes screening?     Screening for gestational diabetes is a way to look for high blood sugar during pregnancy. You drink some very sweet liquid. Then you have a blood test to see how your body uses sugar (glucose).  How is gestational diabetes screening done?  Screening for gestational diabetes may be done in a couple of ways.  Two-part screening.  Part one (glucose challenge test): A blood sample is taken after you drink a liquid that contains sugar (glucose). You don't need to stop eating or drinking before this test. If the test shows that you don't have a lot of sugar in your blood, you don't have gestational diabetes.  Part two (oral glucose tolerance test, or OGTT): If the first test shows a lot of sugar in your blood, then you may have an OGTT. You can't eat or drink for at least 8 hours before this test. A blood sample is taken, then you drink a sweet liquid. You have more blood tests after 1 to 3 hours. If the OGTT shows that you have a lot of sugar in your blood, you may have gestational diabetes.  One-part screening.  Sometimes doctors use the OGTT on its own. If the test shows that you don't have a lot " "of sugar in your blood, you don't have gestational diabetes. If you do have a lot of sugar in your blood, you may have the condition.  What are the risks of screening?  Your blood glucose level may drop very low toward the end of the test. If this happens, you may feel weak, hungry, and restless. Tell your doctor if you have these symptoms. The test usually will be stopped.  You may vomit after drinking the sweet liquid. If this happens, you may need to take the test at a later time.  Your doctor may do more glucose tests at other times during your pregnancy.  Follow-up care is a key part of your treatment and safety. Be sure to make and go to all appointments, and call your doctor if you are having problems. It's also a good idea to know your test results and keep a list of the medicines you take.  Where can you learn more?  Go to https://www.[employer].net/patiented  Enter A472 in the search box to learn more about \"Learning About Screening for Gestational Diabetes.\"  Current as of: March 1, 2023               Content Version: 13.7    5798-0740 [employer].   Care instructions adapted under license by your healthcare professional. If you have questions about a medical condition or this instruction, always ask your healthcare professional. [employer] disclaims any warranty or liability for your use of this information.      You have been provided the My Labor and Birth Wishes document.  Please review at home and bring to your next prenatal visit. Bring this sheet to the hospital for your birth. Give copies to your care team members and support person.   Additional copies can be found here:  [email or web address]  "

## 2023-10-18 ENCOUNTER — VIRTUAL VISIT (OUTPATIENT)
Dept: PSYCHIATRY | Facility: CLINIC | Age: 28
End: 2023-10-18
Attending: PSYCHIATRY & NEUROLOGY
Payer: COMMERCIAL

## 2023-10-18 DIAGNOSIS — F43.9 TRAUMA AND STRESSOR-RELATED DISORDER: ICD-10-CM

## 2023-10-18 DIAGNOSIS — F41.9 ANXIETY DISORDER, UNSPECIFIED TYPE: Primary | ICD-10-CM

## 2023-10-18 DIAGNOSIS — F33.1 MAJOR DEPRESSIVE DISORDER, RECURRENT EPISODE, MODERATE (H): ICD-10-CM

## 2023-10-18 PROCEDURE — 90833 PSYTX W PT W E/M 30 MIN: CPT | Mod: 95 | Performed by: STUDENT IN AN ORGANIZED HEALTH CARE EDUCATION/TRAINING PROGRAM

## 2023-10-18 PROCEDURE — 99214 OFFICE O/P EST MOD 30 MIN: CPT | Mod: 95 | Performed by: STUDENT IN AN ORGANIZED HEALTH CARE EDUCATION/TRAINING PROGRAM

## 2023-10-18 RX ORDER — ESCITALOPRAM OXALATE 10 MG/1
10 TABLET ORAL DAILY
Qty: 30 TABLET | Refills: 1 | Status: SHIPPED | OUTPATIENT
Start: 2023-10-18 | End: 2023-12-17

## 2023-10-18 ASSESSMENT — ANXIETY QUESTIONNAIRES
3. WORRYING TOO MUCH ABOUT DIFFERENT THINGS: MORE THAN HALF THE DAYS
6. BECOMING EASILY ANNOYED OR IRRITABLE: MORE THAN HALF THE DAYS
IF YOU CHECKED OFF ANY PROBLEMS ON THIS QUESTIONNAIRE, HOW DIFFICULT HAVE THESE PROBLEMS MADE IT FOR YOU TO DO YOUR WORK, TAKE CARE OF THINGS AT HOME, OR GET ALONG WITH OTHER PEOPLE: VERY DIFFICULT
GAD7 TOTAL SCORE: 13
4. TROUBLE RELAXING: MORE THAN HALF THE DAYS
5. BEING SO RESTLESS THAT IT IS HARD TO SIT STILL: MORE THAN HALF THE DAYS
2. NOT BEING ABLE TO STOP OR CONTROL WORRYING: MORE THAN HALF THE DAYS
7. FEELING AFRAID AS IF SOMETHING AWFUL MIGHT HAPPEN: SEVERAL DAYS
1. FEELING NERVOUS, ANXIOUS, OR ON EDGE: MORE THAN HALF THE DAYS
GAD7 TOTAL SCORE: 13

## 2023-10-18 ASSESSMENT — PATIENT HEALTH QUESTIONNAIRE - PHQ9
10. IF YOU CHECKED OFF ANY PROBLEMS, HOW DIFFICULT HAVE THESE PROBLEMS MADE IT FOR YOU TO DO YOUR WORK, TAKE CARE OF THINGS AT HOME, OR GET ALONG WITH OTHER PEOPLE: VERY DIFFICULT
SUM OF ALL RESPONSES TO PHQ QUESTIONS 1-9: 16
SUM OF ALL RESPONSES TO PHQ QUESTIONS 1-9: 16
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
## 2023-10-18 NOTE — NURSING NOTE
Is the patient currently in the state of [place]? NO    Visit mode:VIDEO    If the visit is dropped, the patient can be reconnected by: VIDEO VISIT: Send to e-mail at: [email or web address]    Will anyone else be joining the visit? NO  (If patient encounters technical issues they should call [phone]    How would you like to obtain your AVS? MyChart    Are changes needed to the allergy or medication list? No    Reason for visit: No chief complaint on file.    Laura OBRIENF

## 2023-10-18 NOTE — PATIENT INSTRUCTIONS
Thank you for coming to the [facility] [place] [facility](Women's Wellbeing Program).     Treatment Plan    Medications:  Continue Lexapro 10mg daily    Therapy:Continue individual therapy with long term therapist    RTC:In 3 weeks (Nov 3)      Lab Testing:  If you had lab testing today and your results are reassuring or normal they will be mailed to you or sent through [employer] within 7 days. If the lab tests need quick action we will call you with the results. The phone number we will call with results is # [phone]. If this is not the best number please call our clinic and change the number.     Medication Refills:  If you need any refills please call your pharmacy and they will contact us. Our fax number for refills is [phone].   Three business days of notice are needed for general medication refill requests.   Five business days of notice are needed for controlled substance refill requests.   If you need to change to a different pharmacy, please contact the new pharmacy directly. The new pharmacy will help you get your medications transferred.     Contact Us:  Please call [phone] during business hours (8-5:00 M-F).   If you have medication related questions after clinic hours, or on the weekend, please call [phone].     Financial Assistance [phone]   Medical Records [phone]       **For crisis resources, please see the information at the end of this document**     To find additional local resources, refer to Postpartum Support International (PSI). Available at: http://www.postpartum.net/get-help/locations/united-states/  -[facility] for Women's Mental Health at: www.womensmentalhealth.org is a good resource for information about psychiatric medication in pregnancy/lactation  -Mother To Baby A service of the nonprofit Organization of Terat[employer] Information Special[employer] (DEYANIRA), provides evidence based information online and in printable handouts to  "provide patients and providers regarding medications and other exposures during pregnancy and lactation Available at: https://mothertobaby.org/fact-sheets-parent/.  -The 4th Trimester Project - Expert written resources and information for mothers and families. Available at: https://TicketStumbler/  -Consider using \"The Pregnancy and Postpartum Anxiety Workbook,\" by Telma De Los Santos PhD and Denny Myers PsyD.    Postpartum Planning Tools:  Maternal Wellbeing Plan from [facility]: https://www.health.[facility].[place]/people/womeninfants/pmad/pmadsfs.html    4th Trimester Postpartum plan: https://TicketStumbler/mypostpartumplan    Tips for partners:  http://www.postpartum.net/family/tips-for-postpartum-dads-and-partners/        MENTAL HEALTH CRISIS RESOURCES:  For a emergency help, please call 911 or go to the nearest Emergency Department.     Emergency Walk-In Options:   EmPATH Unit @ [facility]): [phone] - Specialized mental health emergency area designed to be calming  [facility] ([place]): [phone]  [facility] ([place]): [phone]  [facility]): [phone]    [facility] Crisis Information:   [place]: [phone]  [place]: [phone]  [place] ([place]) - Adult: [phone]     Child: [phone]  [place] - Adult: [phone]     Child: [phone]  [place]: [phone]  List of all [facility] resources:   https://[place].gov/dhs/people-we-serve/adults/health-care/mental-health/resources/crisis-contacts.jsp    National Crisis Information:   Crisis Text Line: Text  [place]  to 478728  Suicide & Crisis Lifeline: 988  National Suicide Prevention Lifeline: 1-713-599-TALK (1-990.366.6680)       For online chat options, visit https://suicidepreventionlifeline.org/chat/  Poison Control Center: 1-373.776.6714  Trans Lifeline: 1-142-922-9507 - Hotline for transgender people of all ages  The Abdullahi Project: 1-176.353.3976 - Hotline for LGBT youth "     For Non-Emergency Support:   Fast Tracker: Mental Health & Substance Use Disorder Resources -   https://www.Perfect Audiencen.org/

## 2023-10-18 NOTE — PROGRESS NOTES
Virtual Visit Details    Type of service:  Video Visit   Video Start Time:  1:31PM  Video End Time: 2:03PM    Originating Location (pt. Location): Home    Distant Location (provider location):  Off-site  Platform used for Video Visit: Charlie

## 2023-10-19 NOTE — PATIENT INSTRUCTIONS
Second-Trimester Fetal Ultrasound: About This Test  What is it?     Fetal ultrasound is a test that uses sound waves to make pictures of your baby (fetus) and placenta inside the uterus. The test is the safest way to find out the age, size, and position of your baby. You also may be able to find out the sex of your baby. (But the test isn't done just to find out a baby's sex.)  No known risks to the mother or the baby are linked to fetal ultrasound. But you may feel anxious if the test reveals a problem with your pregnancy or baby.  Why is this test done?  In the second trimester, a fetal ultrasound is done to:  Estimate the number of weeks and days a fetus has developed since the beginning of the pregnancy. This is called the gestational age.  Look at the size and position of the fetus, the placenta, and the fluid that surrounds the fetus.  Find major birth defects, such as heart problems or problems with the brain and spinal cord (neural tube defects). But the test may not be able to find many minor defects and some major birth defects.  How do you prepare for the test?  In general, there's nothing you have to do before this test, unless your doctor tells you to.  How is the test done?  You may be able to leave your clothes on, or you will be given a gown to wear.  You will lie on your back on a padded examination table.  A gel will be spread on your belly. It will be removed after the test.  A small, handheld device called a transducer will be pressed against the gel on your skin and moved across your belly several times.  You may watch the monitor to see the picture of your baby during the test.  What happens after the test?  You will probably be able to go home right away.  You most likely will be able to go back to your usual activities right away.  Follow-up care is a key part of your treatment and safety. Be sure to make and go to all appointments, and call your doctor if you are having problems. It's also a  "good idea to keep a list of the medicines you take. Ask your doctor when you can expect to have your test results.  Where can you learn more?  Go to https://www.[employer]net/patiented  Enter Y671 in the search box to learn more about \"Second-Trimester Fetal Ultrasound: About This Test.\"  Current as of: November 9, 2022               Content Version: 13.7    1870-6179 [email or web address].   Care instructions adapted under license by your healthcare professional. If you have questions about a medical condition or this instruction, always ask your healthcare professional. [email or web address] disclaims any warranty or liability for your use of this information.      During Pregnancy: Exercises  Introduction  Here are some examples of exercises to do during your pregnancy. Start each exercise slowly. Ease off the exercise if you start to have pain.  Your doctor or physical therapist will tell you when you can start these exercises and which ones will work best for you.  How to do the exercises  Neck rotation    Sit in a firm chair, or stand tall. If you're standing, keep your feet about hip-width apart.  Keeping your chin level, turn your head to the right and hold for 15 to 30 seconds.  Turn your head to the left and hold for 15 to 30 seconds.  Repeat 2 to 4 times.  Next stretch to the front    Sit in a firm chair, or stand tall. Look straight ahead. If you're standing, keep your feet about hip-width apart.  Slowly bend your head forward without moving your shoulders.  Hold for 15 to 30 seconds, then return to your starting position.  Repeat 2 to 4 times.  Back press    Place your feet 10 to 12 inches from the wall.  Rest your back flat against the wall and slide down the wall until your knees are slightly bent.  Press your lower back against the wall by pulling in your stomach muscles.  Hold for 6 seconds, and then relax your stomach muscles and slide back up the wall.  Repeat 8 to 12 times.  Trunk twist " (seated)    Sit on the floor with your legs crossed. If that's not comfortable, you can sit on a folded blanket so your bottom is a few inches off the floor. Or you can sit on a chair with your knees comfortably spread apart and your feet flat on the floor.  Reach your left hand toward your right knee. You can place your right hand at your side for support.  Slowly twist your trunk (upper body) to your right.  Relax and return to your starting position.  Repeat 2 to 4 times.  Switch your hands and twist to your left.  Repeat 2 to 4 times.  Pelvic rocking    Kneeling on hands and knees, place your hands directly under your shoulders and your knees under your hips.  Breathe in deeply. Tuck your head downward and round your back up, making a curve with your back in the shape of the letter C. Hold this position for a count of 6.  Breathe out slowly and bring your head back up. Relax, keeping your back straight (don't allow it to curve toward the floor). Hold this for a count of 6.  Do this exercise 8 times or to your comfort level.  Pelvic tilt    This exercise strengthens your lower back and pelvis. It is for use during the first 4 months of pregnancy. After this point, lying on your back is not recommended, because it can cause blood flow problems for you and your baby.  Lie on your back.  Keep your knees relaxed.  Tighten your belly and buttocks muscles.  At the same time, gently shift your pelvis upward. This should flatten the curve in your back.  Hold for 6 seconds and then relax.  Gradually increase the number of tilts you do each day, to your comfort level.  Backward stretch    Kneel on hands and knees with your knees 8 to 10 inches apart, hands directly under your shoulders, and arms and back straight.  Keeping your arms straight, slowly lower your buttocks toward your heels and tuck your head toward your knees. Hold for 15 to 30 seconds.  Slowly return to the kneeling position.  Repeat 2 to 4 times.  Forward  bend    Sit comfortably in a chair, with your arms relaxed.  Slowly bend forward, allowing your arms to hang down in front of you. Lean only as far as you can without feeling discomfort or pressure on your belly.  Hold for 15 to 30 seconds and then slowly sit up straight.  Repeat 2 to 4 times or to your comfort level.  Leg lift crawl    Kneeling on hands and knees, place your hands directly under your shoulders and straighten your arms.  Tighten your belly muscles by pulling in your belly button toward your spine. Be sure you continue to breathe normally and do not hold your breath.  Lift your left knee and bring it toward your elbow.  Slowly extend your leg behind you without completely straightening it. Be careful not to let your hip drop down. Avoid arching your back.  Hold your leg behind you for about 6 seconds.  Return to your starting position.  Do the same exercise with your other leg.  Repeat 8 to 12 times for each leg.  Tailor sitting    Sit on the floor.  Bring your feet close to your body while crossing your ankles.  Hold this position for as long as you are comfortable.  Tailor stretching    Sit on the floor with your back straight, legs about 12 inches apart, and feet relaxed outward.  Stretch your hands forward toward your left foot, then sit up.  Stretch your hands straight forward, then sit up.  Stretch your hands forward toward your right foot, then sit up.  Hold each stretch for 15 to 30 seconds.  Repeat 2 to 4 times.  Follow-up care is a key part of your treatment and safety. Be sure to make and go to all appointments, and call your doctor if you are having problems. It's also a good idea to know your test results and keep a list of the medicines you take.  Current as of: November 9, 2022               Content Version: 13.7    3676-6838 [employer].   Care instructions adapted under license by your healthcare professional. If you have questions about a medical condition or this  "instruction, always ask your healthcare professional. [employer] disclaims any warranty or liability for your use of this information.      Weeks 18 to 22 of Your Pregnancy: Care Instructions  At this stage you may find that your nausea and fatigue are gone. You may feel better overall and have more energy. But you might now also have some new discomforts, like sleep problems or leg cramps.    You may start to feel your baby move. These movements can feel like butterflies or bubbles.   Babies at this stage can now suck their thumbs.     Get some exercise every day.  And avoid caffeine late in the day.     Take a warm shower or bath before bed.  Try relaxation exercises to calm your mind and body.     Use extra pillows.  They can help you get comfortable.     Don't use sleeping pills or alcohol.  They could harm your baby.     For leg cramps, stretch and apply heat.  A warm bath, leg warmers, a heating pad, or a hot water bottle can help with muscle aches.   Stretches for leg cramps    Straighten your leg and bend your foot (flex your ankle) slowly upward, toward your knee. Bend your toes up and down.   Stand on a flat surface. Stretch your toes upward. For balance, hold on to the wall or something stable. If it feels okay, take small steps walking on your heels.   Follow-up care is a key part of your treatment and safety. Be sure to make and go to all appointments, and call your doctor if you are having problems. It's also a good idea to know your test results and keep a list of the medicines you take.  Where can you learn more?  Go to https://www.[employer].net/patiented  Enter W603 in the search box to learn more about \"Weeks 18 to 22 of Your Pregnancy: Care Instructions.\"  Current as of: November 9, 2022               Content Version: 13.7    [id number] [employer].   Care instructions adapted under license by your healthcare professional. If you have questions about a medical condition " or this instruction, always ask your healthcare professional. [employer], [employer] disclaims any warranty or liability for your use of this information.      Weeks 22 to 26 of Your Pregnancy: Care Instructions  Your baby's lungs are getting ready for breathing. Your baby may respond to your voice. Your baby likely turns less, and kicks or jerks more. Jerking may mean that your baby has hiccups.    Think about taking childbirth classes. And start to think about whether you want to have pain medicine during labor.   At your next doctor visit, you may be tested for anemia and for high blood sugar that first occurs during pregnancy (gestational diabetes). These conditions can cause problems for you and your baby.     To ease discomfort, such as back pain    Change your position often. Try not to sit or stand for too long.  Get some exercise. Things like walking or stretching may help.  Try using a heating pad or cold pack.    To ease or reduce swelling in your feet, ankles, hands, and fingers    Take off your rings.  Avoid high-sodium foods, such as potato chips.  Prop up your feet, and sleep with pillows under your feet.  Try to avoid standing for long periods of time.  Do not wear tight shoes.  Wear support stockings.  Kegel exercises to prevent urine from leaking    Squeeze your muscles as if you were trying not to pass gas. Your belly, legs, and buttocks shouldn't move. Hold the squeeze for 3 seconds, then relax for 5 to 10 seconds.    Add 1 second each week until you can squeeze for 10 seconds. Repeat the exercise 10 times a session. Do 3 to 8 sessions a day. If these exercises cause you pain, stop doing them and talk with your doctor.  Follow-up care is a key part of your treatment and safety. Be sure to make and go to all appointments, and call your doctor if you are having problems. It's also a good idea to know your test results and keep a list of the medicines you take.  Where can you learn more?  Go to  "https://www.[employer].net/patiented  Enter G264 in the search box to learn more about \"Weeks 22 to 26 of Your Pregnancy: Care Instructions.\"  Current as of: November 9, 2022               Content Version: 13.7    2991-7512 [employer].   Care instructions adapted under license by your healthcare professional. If you have questions about a medical condition or this instruction, always ask your healthcare professional. [employer] disclaims any warranty or liability for your use of this information.      Round Ligament Pain: Care Instructions  Your Care Instructions     Round ligament pain is a common pain during pregnancy. You may feel a sharp brief pain on one or both sides of your belly. It may go down into your groin. It's usually felt for the first time during the second trimester.  This pain is a normal part of pregnancy. It will go away as your pregnancy continues or after your baby is born.  Your uterus is supported by two ligaments that go from the top and sides of the uterus to the bones of the pelvis. These are the round ligaments. As your uterus grows, these ligaments stretch and tighten with your movements. This may be the cause of the pain. You may find that certain activities seem to cause pain. If you can, avoid those activities.  Your doctor can usually diagnose round ligament pain from your symptoms and an exam. If you have bleeding or other symptoms, your doctor may also do an imaging test, such as an ultrasound. Your doctor may suggest that you take an over-the-counter pain medicine, such as acetaminophen.  Follow-up care is a key part of your treatment and safety. Be sure to make and go to all appointments, and call your doctor if you are having problems. It's also a good idea to know your test results and keep a list of the medicines you take.  How can you care for yourself at home?  If certain movements seem to trigger belly pain, see if you can avoid them while you " "are pregnant.  Stay active. If your doctor says it's okay, try moderate exercise. Water exercise may be a good choice if you have belly pain. Examples are swimming and water aerobics.  Ask your doctor about taking acetaminophen for pain. Be safe with medicines. Read and follow all instructions on the label.  When should you call for help?   Call your doctor now or seek immediate medical care if:    You think you might be in labor.     You have new or worse pain.   Watch closely for changes in your health, and be sure to contact your doctor if you have any problems.  Where can you learn more?  Go to https://www.[employer].net/patiented  Enter R110 in the search box to learn more about \"Round Ligament Pain: Care Instructions.\"  Current as of: November 9, 2022               Content Version: 13.7    0177-0698 [employer].   Care instructions adapted under license by your healthcare professional. If you have questions about a medical condition or this instruction, always ask your healthcare professional. [employer] disclaims any warranty or liability for your use of this information.      Leg and Ankle Edema: Care Instructions  Your Care Instructions  Swelling in the legs, ankles, and feet is called edema. It is common after you sit or stand for a while. Long plane flights or car rides often cause swelling in the legs and feet. You may also have swelling if you have to stand for long periods of time at your job. Problems with the veins in the legs (varicose veins) and changes in hormones can also cause swelling. Sometimes the swelling in the ankles and feet is caused by a more serious problem, such as heart failure, infection, blood clots, or liver or kidney disease.  Follow-up care is a key part of your treatment and safety. Be sure to make and go to all appointments, and call your doctor if you are having problems. It's also a good idea to know your test results and keep a list of the " "medicines you take.  How can you care for yourself at home?  If your doctor gave you medicine, take it as prescribed. Call your doctor if you think you are having a problem with your medicine.  Whenever you are resting, raise your legs up. Try to keep the swollen area higher than the level of your heart.  Take breaks from standing or sitting in one position.  Walk around to increase the blood flow in your lower legs.  Move your feet and ankles often while you stand, or tighten and relax your leg muscles.  Wear support stockings. Put them on in the morning, before swelling gets worse.  Eat a balanced diet. Lose weight if you need to.  Limit the amount of salt (sodium) in your diet. Salt holds fluid in the body and may increase swelling.  When should you call for help?   Call 911 anytime you think you may need emergency care. For example, call if:    You have symptoms of a blood clot in your lung (called a pulmonary embolism). These may include:  Sudden chest pain.  Trouble breathing.  Coughing up blood.   Call your doctor now or seek immediate medical care if:    You have signs of a blood clot, such as:  Pain in your calf, back of the knee, thigh, or groin.  Redness and swelling in your leg or groin.     You have symptoms of infection, such as:  Increased pain, swelling, warmth, or redness.  Red streaks or pus.  A fever.   Watch closely for changes in your health, and be sure to contact your doctor if:    Your swelling is getting worse.     You have new or worsening pain in your legs.     You do not get better as expected.   Where can you learn more?  Go to https://www.[employer].net/patiented  Enter N696 in the search box to learn more about \"Leg and Ankle Edema: Care Instructions.\"  Current as of: November 14, 2022               Content Version: 13.7    7969-8558 [employer].   Care instructions adapted under license by your healthcare professional. If you have questions about a medical condition or " this instruction, always ask your healthcare professional. Healthwise, [employer] disclaims any warranty or liability for your use of this information.      Back Pain During Pregnancy: Care Instructions  Overview     Back pain has many possible causes. It is often caused by problems with muscles and ligaments in your back. The extra weight during pregnancy can put stress on your back. Moving, lifting, standing, sitting, or sleeping in an awkward way also can strain your back. Back pain can also be a sign of labor. Although it may hurt a lot, back pain often improves on its own. Use good home treatment, and take care not to stress your back.  Follow-up care is a key part of your treatment and safety. Be sure to make and go to all appointments, and call your doctor if you are having problems. It's also a good idea to know your test results and keep a list of the medicines you take.  How can you care for yourself at home?  Ask your doctor about taking acetaminophen (Tylenol) for pain. Do not take aspirin, ibuprofen (Advil, Motrin), or naproxen (Aleve).  Do not take two or more pain medicines at the same time unless the doctor told you to. Many pain medicines have acetaminophen, which is Tylenol. Too much acetaminophen (Tylenol) can be harmful.  Lie on your side with your knees and hips bent and a pillow between your legs. This reduces stress on your back.  Put ice or cold packs on your back for 10 to 20 minutes at a time, several times a day. Put a thin cloth between the ice and your skin.  Warm baths may also help reduce pain.  Change positions every 30 minutes. Take breaks if you must sit for a long time. Get up and walk around.  Ask your doctor about how much exercise you can do. You may feel better taking short walks or doing gentle movements and stretching in a swimming pool.  Ask your doctor about exercises to stretch and strengthen your back.  When should you call for help?   Call your doctor now or seek  "immediate medical care if:    You think you are in labor.     You have new numbness in your buttocks, genital or rectal areas, or legs.     You have a new loss of bowel or bladder control.   Watch closely for changes in your health, and be sure to contact your doctor if:    You do not get better as expected.   Where can you learn more?  Go to https://www.[employer].net/patiented  Enter C696 in the search box to learn more about \"Back Pain During Pregnancy: Care Instructions.\"  Current as of: 2022               Content Version: 13.7    7504-2825 [employer].   Care instructions adapted under license by your healthcare professional. If you have questions about a medical condition or this instruction, always ask your healthcare professional. [employer] disclaims any warranty or liability for your use of this information.       Labor: Care Instructions  Overview      labor is the start of labor between 20 and 36 weeks of pregnancy. Most babies are born at 37 to 42 weeks of pregnancy. In labor, the uterus contracts to open the cervix. This is the first stage of childbirth.  labor can be caused by a problem with the baby, the mother, or both. Often the cause is not known.  In some cases, doctors use medicines to try to delay labor until 34 or more weeks of pregnancy. By this time, a baby has grown enough so that problems are not likely. In some cases--such as with a serious infection--it is healthier for the baby to be born early. Your treatment will depend on how far along you are in your pregnancy and on your health and your baby's health.  Follow-up care is a key part of your treatment and safety. Be sure to make and go to all appointments, and call your doctor if you are having problems. It's also a good idea to know your test results and keep a list of the medicines you take.  How can you care for yourself at home?  If your doctor prescribed medicines, take " them exactly as directed. Call your doctor if you think you are having a problem with your medicine.  Rest until your doctor advises you about activity.  Do not have sexual intercourse unless your doctor says it is safe.  Use sanitary pads if you have vaginal bleeding. Using pads makes it easier to monitor your bleeding.  Do not smoke or allow others to smoke around you. If you need help quitting, talk to your doctor about stop-smoking programs and medicines. These can increase your chances of quitting for good.  When should you call for help?   Call 911  anytime you think you may need emergency care. For example, call if:    You passed out (lost consciousness).     You have a seizure.     You have severe vaginal bleeding.     You have severe pain in your belly or pelvis that doesn't get better between contractions.     You have had fluid gushing or leaking from your vagina and you know or think the umbilical cord is bulging into your vagina. If this happens, immediately get down on your knees so your rear end (buttocks) is higher than your head. This will decrease the pressure on the cord until help arrives.   Call your doctor now or seek immediate medical care if:    You have signs of preeclampsia, such as:  Sudden swelling of your face, hands, or feet.  New vision problems (such as dimness, blurring, or seeing spots).  A severe headache.     You have any vaginal bleeding.     You have belly pain or cramping.     You have a fever.     You have had regular contractions (with or without pain) for an hour. This means that you have 6 or more within 1 hour after you change your position and drink fluids.     You have a sudden release of fluid from the vagina.     You have low back pain or pelvic pressure that does not go away.     You notice that your baby has stopped moving or is moving much less than normal.   Watch closely for changes in your health, and be sure to contact your doctor if you have any  "problems.  Where can you learn more?  Go to https://www.[employer].net/patiented  Enter Q400 in the search box to learn more about \" Labor: Care Instructions.\"  Current as of: 2022               Content Version: 13.7    9663-9365 [employer].   Care instructions adapted under license by your healthcare professional. If you have questions about a medical condition or this instruction, always ask your healthcare professional. [employer] disclaims any warranty or liability for your use of this information.      Weeks 22 to 26 of Your Pregnancy: Care Instructions  Your baby's lungs are getting ready for breathing. Your baby may respond to your voice. Your baby likely turns less, and kicks or jerks more. Jerking may mean that your baby has hiccups.    Think about taking childbirth classes. And start to think about whether you want to have pain medicine during labor.   At your next doctor visit, you may be tested for anemia and for high blood sugar that first occurs during pregnancy (gestational diabetes). These conditions can cause problems for you and your baby.     To ease discomfort, such as back pain    Change your position often. Try not to sit or stand for too long.  Get some exercise. Things like walking or stretching may help.  Try using a heating pad or cold pack.    To ease or reduce swelling in your feet, ankles, hands, and fingers    Take off your rings.  Avoid high-sodium foods, such as potato chips.  Prop up your feet, and sleep with pillows under your feet.  Try to avoid standing for long periods of time.  Do not wear tight shoes.  Wear support stockings.  Kegel exercises to prevent urine from leaking    Squeeze your muscles as if you were trying not to pass gas. Your belly, legs, and buttocks shouldn't move. Hold the squeeze for 3 seconds, then relax for 5 to 10 seconds.    Add 1 second each week until you can squeeze for 10 seconds. Repeat the exercise 10 " "times a session. Do 3 to 8 sessions a day. If these exercises cause you pain, stop doing them and talk with your doctor.  Follow-up care is a key part of your treatment and safety. Be sure to make and go to all appointments, and call your doctor if you are having problems. It's also a good idea to know your test results and keep a list of the medicines you take.  Where can you learn more?  Go to [email or web address]  Enter G264 in the search box to learn more about \"Weeks 22 to 26 of Your Pregnancy: Care Instructions.\"  Current as of: November 9, 2022               Content Version: 13.7    8032-6709 [employer].   Care instructions adapted under license by your healthcare professional. If you have questions about a medical condition or this instruction, always ask your healthcare professional. [employer] disclaims any warranty or liability for your use of this information.      Learning About Screening for Gestational Diabetes  What is gestational diabetes screening?     Screening for gestational diabetes is a way to look for high blood sugar during pregnancy. You drink some very sweet liquid. Then you have a blood test to see how your body uses sugar (glucose).  How is gestational diabetes screening done?  Screening for gestational diabetes may be done in a couple of ways.  Two-part screening.  Part one (glucose challenge test): A blood sample is taken after you drink a liquid that contains sugar (glucose). You don't need to stop eating or drinking before this test. If the test shows that you don't have a lot of sugar in your blood, you don't have gestational diabetes.  Part two (oral glucose tolerance test, or OGTT): If the first test shows a lot of sugar in your blood, then you may have an OGTT. You can't eat or drink for at least 8 hours before this test. A blood sample is taken, then you drink a sweet liquid. You have more blood tests after 1 to 3 hours. If the OGTT " "shows that you have a lot of sugar in your blood, you may have gestational diabetes.  One-part screening.  Sometimes doctors use the OGTT on its own. If the test shows that you don't have a lot of sugar in your blood, you don't have gestational diabetes. If you do have a lot of sugar in your blood, you may have the condition.  What are the risks of screening?  Your blood glucose level may drop very low toward the end of the test. If this happens, you may feel weak, hungry, and restless. Tell your doctor if you have these symptoms. The test usually will be stopped.  You may vomit after drinking the sweet liquid. If this happens, you may need to take the test at a later time.  Your doctor may do more glucose tests at other times during your pregnancy.  Follow-up care is a key part of your treatment and safety. Be sure to make and go to all appointments, and call your doctor if you are having problems. It's also a good idea to know your test results and keep a list of the medicines you take.  Where can you learn more?  Go to [email or web address]  Enter A472 in the search box to learn more about \"Learning About Screening for Gestational Diabetes.\"  Current as of: March 1, 2023               Content Version: 13.7    3896-5742 [employer].   Care instructions adapted under license by your healthcare professional. If you have questions about a medical condition or this instruction, always ask your healthcare professional. [employer] disclaims any warranty or liability for your use of this information.      You have been provided the My Labor and Birth Wishes document.  Please review at home and bring to your next prenatal visit. Bring this sheet to the hospital for your birth. Give copies to your care team members and support person.   Additional copies can be found here:  [email or web address]      .[email or web address]  "

## 2023-10-20 ENCOUNTER — PATIENT OUTREACH (OUTPATIENT)
Dept: CARE COORDINATION | Facility: CLINIC | Age: 28
End: 2023-10-20
Payer: COMMERCIAL

## 2023-10-20 ENCOUNTER — OFFICE VISIT (OUTPATIENT)
Dept: MATERNAL FETAL MEDICINE | Facility: CLINIC | Age: 28
End: 2023-10-20
Attending: OBSTETRICS & GYNECOLOGY
Payer: COMMERCIAL

## 2023-10-20 ENCOUNTER — HOSPITAL ENCOUNTER (OUTPATIENT)
Dept: ULTRASOUND IMAGING | Facility: CLINIC | Age: 28
Discharge: HOME | End: 2023-10-20
Attending: OBSTETRICS & GYNECOLOGY
Payer: COMMERCIAL

## 2023-10-20 ENCOUNTER — LAB (OUTPATIENT)
Dept: LAB | Facility: CLINIC | Age: 28
End: 2023-10-20
Attending: OBSTETRICS & GYNECOLOGY
Payer: COMMERCIAL

## 2023-10-20 ENCOUNTER — PRENATAL OFFICE VISIT (OUTPATIENT)
Dept: OBGYN | Facility: CLINIC | Age: 28
End: 2023-10-20
Attending: REGISTERED NURSE
Payer: COMMERCIAL

## 2023-10-20 VITALS
HEIGHT: 65 IN | DIASTOLIC BLOOD PRESSURE: 66 MMHG | WEIGHT: 147.4 LBS | BODY MASS INDEX: 24.56 KG/M2 | SYSTOLIC BLOOD PRESSURE: 110 MMHG | HEART RATE: 92 BPM

## 2023-10-20 DIAGNOSIS — F44.5 CONVERSION DISORDER WITH SEIZURES OR CONVULSIONS: ICD-10-CM

## 2023-10-20 DIAGNOSIS — O09.90 HIGH-RISK PREGNANCY, UNSPECIFIED TRIMESTER: Primary | ICD-10-CM

## 2023-10-20 DIAGNOSIS — O21.0 HYPEREMESIS GRAVIDARUM: ICD-10-CM

## 2023-10-20 DIAGNOSIS — O09.892 HISTORY OF PRETERM DELIVERY, CURRENTLY PREGNANT IN SECOND TRIMESTER: ICD-10-CM

## 2023-10-20 DIAGNOSIS — Z67.91 RH NEGATIVE STATE IN ANTEPARTUM PERIOD: ICD-10-CM

## 2023-10-20 DIAGNOSIS — F32.A DEPRESSIVE DISORDER: ICD-10-CM

## 2023-10-20 DIAGNOSIS — Z87.51 HISTORY OF PREMATURE DELIVERY: ICD-10-CM

## 2023-10-20 DIAGNOSIS — O09.899 RUBELLA NON-IMMUNE STATUS, ANTEPARTUM: ICD-10-CM

## 2023-10-20 DIAGNOSIS — O09.90 HIGH-RISK PREGNANCY, UNSPECIFIED TRIMESTER: ICD-10-CM

## 2023-10-20 DIAGNOSIS — O26.899 RH NEGATIVE STATE IN ANTEPARTUM PERIOD: ICD-10-CM

## 2023-10-20 DIAGNOSIS — O09.892 HISTORY OF PRETERM DELIVERY, CURRENTLY PREGNANT IN SECOND TRIMESTER: Primary | ICD-10-CM

## 2023-10-20 DIAGNOSIS — F41.9 ANXIETY DISORDER, UNSPECIFIED TYPE: ICD-10-CM

## 2023-10-20 DIAGNOSIS — Z87.898 HISTORY OF SUBSTANCE USE DISORDER: ICD-10-CM

## 2023-10-20 DIAGNOSIS — Z98.891 HISTORY OF CESAREAN DELIVERY: ICD-10-CM

## 2023-10-20 DIAGNOSIS — Z28.39 RUBELLA NON-IMMUNE STATUS, ANTEPARTUM: ICD-10-CM

## 2023-10-20 PROBLEM — O20.9 VAGINAL BLEEDING BEFORE 22 WEEKS GESTATION: Status: RESOLVED | Noted: 2023-08-30 | Resolved: 2023-10-20

## 2023-10-20 LAB
HBV SURFACE AB SERPL IA-ACNC: 0.59 M[IU]/ML
HBV SURFACE AB SERPL IA-ACNC: NONREACTIVE M[IU]/ML
VIT D+METAB SERPL-MCNC: 14 NG/ML (ref 20–50)

## 2023-10-20 PROCEDURE — 86706 HEP B SURFACE ANTIBODY: CPT

## 2023-10-20 PROCEDURE — 76816 OB US FOLLOW-UP PER FETUS: CPT | Mod: 26 | Performed by: OBSTETRICS & GYNECOLOGY

## 2023-10-20 PROCEDURE — 82306 VITAMIN D 25 HYDROXY: CPT

## 2023-10-20 PROCEDURE — 76816 OB US FOLLOW-UP PER FETUS: CPT

## 2023-10-20 PROCEDURE — 86787 VARICELLA-ZOSTER ANTIBODY: CPT

## 2023-10-20 PROCEDURE — G0463 HOSPITAL OUTPT CLINIC VISIT: HCPCS | Performed by: REGISTERED NURSE

## 2023-10-20 PROCEDURE — 36415 COLL VENOUS BLD VENIPUNCTURE: CPT

## 2023-10-20 PROCEDURE — 99207 PR PRENATAL VISIT: CPT | Performed by: REGISTERED NURSE

## 2023-10-20 RX ORDER — HEPARIN SODIUM,PORCINE 10 UNIT/ML
5-20 VIAL (ML) INTRAVENOUS DAILY PRN
Status: CANCELLED | OUTPATIENT
Start: 2023-10-20

## 2023-10-20 RX ORDER — ONDANSETRON 2 MG/ML
4 INJECTION INTRAMUSCULAR; INTRAVENOUS ONCE
Status: CANCELLED | OUTPATIENT
Start: 2023-10-20 | End: 2023-10-20

## 2023-10-20 RX ORDER — HEPARIN SODIUM (PORCINE) LOCK FLUSH IV SOLN 100 UNIT/ML 100 UNIT/ML
5 SOLUTION INTRAVENOUS
Status: CANCELLED | OUTPATIENT
Start: 2023-10-20

## 2023-10-20 RX ORDER — DIPHENHYDRAMINE HYDROCHLORIDE 50 MG/ML
25 INJECTION INTRAMUSCULAR; INTRAVENOUS
Status: CANCELLED
Start: 2023-10-20

## 2023-10-20 ASSESSMENT — ANXIETY QUESTIONNAIRES
3. WORRYING TOO MUCH ABOUT DIFFERENT THINGS: MORE THAN HALF THE DAYS
6. BECOMING EASILY ANNOYED OR IRRITABLE: MORE THAN HALF THE DAYS
2. NOT BEING ABLE TO STOP OR CONTROL WORRYING: MORE THAN HALF THE DAYS
1. FEELING NERVOUS, ANXIOUS, OR ON EDGE: MORE THAN HALF THE DAYS
IF YOU CHECKED OFF ANY PROBLEMS ON THIS QUESTIONNAIRE, HOW DIFFICULT HAVE THESE PROBLEMS MADE IT FOR YOU TO DO YOUR WORK, TAKE CARE OF THINGS AT HOME, OR GET ALONG WITH OTHER PEOPLE: VERY DIFFICULT
5. BEING SO RESTLESS THAT IT IS HARD TO SIT STILL: MORE THAN HALF THE DAYS

## 2023-10-20 ASSESSMENT — COLUMBIA-SUICIDE SEVERITY RATING SCALE - C-SSRS
6. HAVE YOU EVER DONE ANYTHING, STARTED TO DO ANYTHING, OR PREPARED TO DO ANYTHING TO END YOUR LIFE?: NO
2. IN THE PAST MONTH, HAVE YOU ACTUALLY HAD ANY THOUGHTS OF KILLING YOURSELF?: NO
1. WITHIN THE PAST MONTH, HAVE YOU WISHED YOU WERE DEAD OR WISHED YOU COULD GO TO SLEEP AND NOT WAKE UP?: YES

## 2023-10-20 ASSESSMENT — PAIN SCALES - GENERAL: PAINLEVEL: NO PAIN (0)

## 2023-10-20 ASSESSMENT — PATIENT HEALTH QUESTIONNAIRE - PHQ9
5. POOR APPETITE OR OVEREATING: MORE THAN HALF THE DAYS
SUM OF ALL RESPONSES TO PHQ QUESTIONS 1-9: 17

## 2023-10-20 NOTE — PROGRESS NOTES
"Transfer of Care  SUBJECTIVE  28 year old woman presents to clinic for transfer of OB care appointment.    Patient's last menstrual period was 2023.  at 25w6d by Estimated Date of Delivery: 2024 based on US NOT c/w LMP.     - Feels awful due to hyperemesis and daily victor manuel clements, round ligament pain, sciatica.   - Pt was [place] from [place].  Initiated prenatal care at 25w6d weeks, is unsure of total visits.      - Reason for transfer to [facility] care was per [place] recommendation d/t hx of PTB x2  - paper prenatal records received via fax, reviewed, prenatal records available in Epic, reviewed, and prenatal records available in Care Everywhere, reviewed   - After review of prenatal records, additional routine orders are recommended including: Hep B antibody, Vit D, varicella. Follow up growth US in 4 weeks. Continue monthly.   -Level II Ultrasound reviewed, normal results, follow up growth q4 weeks and weekly BPP @ 32w  - Pre pregnancy BMI 20.8.   Pre Pregnancy Weight 125.  Height 5'5\".   - IV home infusion for hyperemesis. 2L per day one AM and one PM. 4mg IV zofran and 10mg IV Reglan per day as well. Able to tolerate some small bites and sips (root beer and flavored primarily). Has been getting stuck multiple times per visit and this causes significant anxiety which then is triggering to her seizure disorder. Desperately desires midline IV placement. Will coordinate with RN team.       OTHER CONCERNS: Migraines, she has chronic migraines prepregancy, and now they are getting worse. Aura present, getting \"thicker.\" Migraines are daily, sometimes manageable and sometimes induce vomiting, but sometimes the vomiting makes migraine better. Very concerned about trauma in labor and does not want opiates. Open to IV benadryl with home infusion to help with migraines.     Obstetric history reviewed:   Hx  2019, Hx C/S x1 d/t PPROM @ 34w & breech  Desires " TOLAC    GENETICS  - Panorama completed- low risk  Pt  does not have a recent known exposure to Parvo or CMV so IgG/IgM testing WILL NOT be ordered.     - Current Medications    Current Outpatient Medications   Medication Sig Dispense Refill    escitalopram (LEXAPRO) 10 MG tablet Take 1 tablet (10 mg) by mouth daily for 60 days 30 tablet 1         - Co-morbids    Past Medical History:   Diagnosis Date    ADHD (attention deficit hyperactivity disorder)     Anxiety     Anxiety disorder 10/15/2008    Asperger's disorder 2009    Attention deficit hyperactivity disorder (ADHD) 10/15/2008    Formatting of this note might be different from the original.  LW Onset:  [id number]    Benign joint hypermobility 2010    Bipolar disorder (H)      delivery delivered 2022    Chronic back pain 10/16/2023    Conversion disorder with seizures or convulsions 2019    Depressive disorder     Developmental expressive writing disorder 2009    Headache 2023    History of anemia 10/16/2023    History of asthma 2013    History of eating disorder     History of premature delivery 10/16/2023    07/01/19: PTD@34.4wks, PPROM, @ [facility],  Q trimester both previous pregnancies at 34 wks, [facility] referral ordered    History of substance use     opiate    History of substance use disorder 10/16/2023    In remission. Hx of relapse after last delivery. Avoid narcotics in labor and delivery.     Hyperemesis gravidarum 10/17/2023    Major depressive disorder, recurrent episode, moderate (H) 2023    Migraine 2023    Mixed anxiety and depressive disorder 10/05/2022    started on sertraline 50mg at 8wks started on sertraline 50mg at 8wks    Orthostatic hypotension 2011    OTC (ornithine transcarbamylase deficiency) (H24)     family hx     delivery 2019    Psychogenic nonepileptic seizure     Rh negative state in antepartum period 10/16/2023    Needs rhogam at 28 weeks    Rubella  "non-immune status, antepartum     Seizures (H) 2019    no meds, last seizure 2022, had neuro referral with last pregnancy and was told it was \"anxiety induced\"- she wants to treat with sertraline only. no meds, last seizure 2022, referral sent to neuro, possible seizure 22, has neuro appt 22--feels anxiety induced and wants to treat with sertraline only. PER GUIDELINES - does not qualify for CNM care    Severe major depression without psychotic features (H) 2010    Syncope and collapse 2008    Formatting of this note might be different from the original. LW Modifier:  3/10/08 ; Syncope Formatting of this note might be different from the original. Formatting of this note might be different from the original. LW Modifier:  3/10/08 ; Syncope    Tobacco use disorder 2011    Formatting of this note might be different from the original. Tobacco Abuse Formatting of this note might be different from the original. Formatting of this note might be different from the original. Tobacco Abuse    Uncomplicated asthma     no inhaler use since high school    Vaginal bleeding before 22 weeks gestation 2023       - Risk for GDM -  has No known risk factors for GDM WILL NOT have an early GCT and possible Hgb A1C. States she completed her GCT last week with Priscilla AHMADI.    - High Risk for Pre E-  Has No known risk factors of High risk for Pre E so WILL NOT start low dose aspirin (81mg) starting between 12 and 28 weeks to prevent early onset preeclampsia.    - Moderate risk - has moderate risk factors for Pre E including: Socioeconomic, Hx of adverse pregnancy outcome.    Since she meets two of the moderate risk facrtors for Pre E -   so WILL consider starting low dose aspirin (81mg) starting between 12 and 28 weeks to prevent early onset preeclampsia.    - The patient  does have a history of spontaneous  birth , but did not get cervical length ultrasounds. Currently late gestation. "     -The patient does not have a history of immunosuppresion or HIV so Toxoplasma IgG/IgM WILL NOT be ordered.    PERSONAL/SOCIAL HISTORY  Partner is involved,  Renan  co-habitating  lives with their spouse.  Employment: Full time. Her job involves long periods of standing .  History of anxiety or depression: Both anxiety and depression. Currently taking Lexapro 10mg when she can keep it down. Sees therapist weekly. Elevated SARAH-7 and PHQ-9 scoring. +question #9. RN team met with patient and reviewed safety plan.   Additional items: Has applied for WIC  Has been on WIC      PSYCHIATRIC:   Has History of mood changes, difficulty concentrating, depression, anxiety, panic attacks, and post partum depression    PHQ-9 score:        10/20/2023    12:07 PM   PHQ-9 SCORE   PHQ-9 Total Score 17         2022    10:31 AM 2023    12:47 PM 10/18/2023     2:19 PM   SARAH-7 SCORE   Total Score 18 (severe anxiety) 19 (severe anxiety) 13 (moderate anxiety)   Total Score 18 19 13       Past History:  Her past medical history   Past Medical History:   Diagnosis Date    ADHD (attention deficit hyperactivity disorder)     Anxiety     Anxiety disorder 10/15/2008    Asperger's disorder 2009    Attention deficit hyperactivity disorder (ADHD) 10/15/2008    Formatting of this note might be different from the original.  LW Onset:  [id number]    Benign joint hypermobility 2010    Bipolar disorder (H)      delivery delivered 2022    Chronic back pain 10/16/2023    Conversion disorder with seizures or convulsions 2019    Depressive disorder     Developmental expressive writing disorder 2009    Headache 2023    History of anemia 10/16/2023    History of asthma 2013    History of eating disorder     History of premature delivery 10/16/2023    07/01/19: PTD@34.4wks, PPROM, @ [facility],,  Q trimester both previous pregnancies at 34 wks, [facility] referral ordered    History of substance use     opiate  "   History of substance use disorder 10/16/2023    In remission. Hx of relapse after last delivery. Avoid narcotics in labor and delivery.     Hyperemesis gravidarum 10/17/2023    Major depressive disorder, recurrent episode, moderate (H) 2023    Migraine 2023    Mixed anxiety and depressive disorder 10/05/2022    started on sertraline 50mg at 8wks started on sertraline 50mg at 8wks    Orthostatic hypotension 2011    OTC (ornithine transcarbamylase deficiency) (H24)     family hx     delivery 2019    Psychogenic nonepileptic seizure     Rh negative state in antepartum period 10/16/2023    Needs rhogam at 28 weeks    Rubella non-immune status, antepartum     Seizures (H) 2019    no meds, last seizure 2022, had neuro referral with last pregnancy and was told it was \"anxiety induced\"- she wants to treat with sertraline only. no meds, last seizure 2022, referral sent to neuro, possible seizure 22, has neuro appt 22--feels anxiety induced and wants to treat with sertraline only. PER GUIDELINES - does not qualify for CNM care    Severe major depression without psychotic features (H) 2010    Syncope and collapse 2008    Formatting of this note might be different from the original. LW Modifier:  3/10/08 ; Syncope Formatting of this note might be different from the original. Formatting of this note might be different from the original. LW Modifier:  3/10/08 ; Syncope    Tobacco use disorder 2011    Formatting of this note might be different from the original. Tobacco Abuse Formatting of this note might be different from the original. Formatting of this note might be different from the original. Tobacco Abuse    Uncomplicated asthma     no inhaler use since high school    Vaginal bleeding before 22 weeks gestation 2023   .   She has a history of  pre-term delivery at 34 weeks x2   Since her last LMP she denies use of alcohol, tobacco and street " "drugs. Endorses hx of MACK.   HISTORY:  Family History   Problem Relation Age of Onset    Breast Cancer Mother     Anxiety Disorder Mother     Depression Mother     Hypertension Mother     Other - See Comments Mother         severe hyperemesis    Diabetes Mother     Ovarian Cancer Mother     Cancer Mother         pelvic, anal, stomach, colon, skin x6    Post-Traumatic Stress Disorder (PTSD) Mother     Cerebrovascular Disease Mother     Coronary Artery Disease Mother     Asthma Mother     Chronic Obstructive Pulmonary Disease Mother     No Known Problems Father     No Known Problems Sister     Emphysema Maternal Grandfather     No Known Problems Paternal Grandmother     No Known Problems Paternal Grandfather      Social History     Socioeconomic History    Marital status: Single   Tobacco Use    Smoking status: Former     Types: Cigarettes     Quit date: 3/1/2023     Years since quittin.6     Passive exposure: Yes    Smokeless tobacco: Former   Substance and Sexual Activity    Alcohol use: Not Currently    Drug use: Not Currently    Sexual activity: Yes     Partners: Male     Current Outpatient Medications   Medication Sig    escitalopram (LEXAPRO) 10 MG tablet Take 1 tablet (10 mg) by mouth daily for 60 days     No current facility-administered medications for this visit.     Allergies   Allergen Reactions    Adhesive Tape Other (See Comments), Hives and Nausea     Burns only with waterproof bandages. States hospital Tegaderm and tape is okay.    Koehler only with waterproof bandages. States hospital Tegaderm and tape is okay.   \"burns\"    Dextromethorphan-Guaifenesin Hives    Guaifenesin Hives and Nausea and Vomiting    Latex Hives, Itching and Rash    Morphine And Related Hives    Mushroom Anaphylaxis    Mushroom Extract Complex Anaphylaxis and Unknown    Other (Do Not Use) Hives and Other (See Comments)     Burns only with waterproof bandages. States hospital Tegaderm and tape is okay.   \"burns\" " "      ============================================  MEDICAL HISTORY  Past Medical History:   Diagnosis Date    ADHD (attention deficit hyperactivity disorder)     Anxiety     Anxiety disorder 10/15/2008    Asperger's disorder 2009    Attention deficit hyperactivity disorder (ADHD) 10/15/2008    Formatting of this note might be different from the original.  LW Onset:  [id number]    Benign joint hypermobility 2010    Bipolar disorder (H)      delivery delivered 2022    Chronic back pain 10/16/2023    Conversion disorder with seizures or convulsions 2019    Depressive disorder     Developmental expressive writing disorder 2009    Headache 2023    History of anemia 10/16/2023    History of asthma 2013    History of eating disorder     History of premature delivery 10/16/2023    07/01/19: PTD@34.4wks, PPROM, @ [facility],,  Q trimester both previous pregnancies at 34 wks, [facility] referral ordered    History of substance use     opiate    History of substance use disorder 10/16/2023    In remission. Hx of relapse after last delivery. Avoid narcotics in labor and delivery.     Hyperemesis gravidarum 10/17/2023    Major depressive disorder, recurrent episode, moderate (H) 2023    Migraine 2023    Mixed anxiety and depressive disorder 10/05/2022    started on sertraline 50mg at 8wks started on sertraline 50mg at 8wks    Orthostatic hypotension 2011    OTC (ornithine transcarbamylase deficiency) (H24)     family hx     delivery 2019    Psychogenic nonepileptic seizure     Rh negative state in antepartum period 10/16/2023    Needs rhogam at 28 weeks    Rubella non-immune status, antepartum     Seizures (H) 2019    no meds, last seizure 2022, had neuro referral with last pregnancy and was told it was \"anxiety induced\"- she wants to treat with sertraline only. no meds, last seizure 2022, referral sent to neuro, possible seizure 22, has neuro " appt 22--feels anxiety induced and wants to treat with sertraline only. PER GUIDELINES - does not qualify for CNM care    Severe major depression without psychotic features (H) 2010    Syncope and collapse 2008    Formatting of this note might be different from the original. LW Modifier:  3/10/08 ; Syncope Formatting of this note might be different from the original. Formatting of this note might be different from the original. LW Modifier:  3/10/08 ; Syncope    Tobacco use disorder 2011    Formatting of this note might be different from the original. Tobacco Abuse Formatting of this note might be different from the original. Formatting of this note might be different from the original. Tobacco Abuse    Uncomplicated asthma     no inhaler use since high school    Vaginal bleeding before 22 weeks gestation 2023     Past Surgical History:   Procedure Laterality Date    AS REMOVAL OF TONSILS,<11 Y/O       SECTION N/A 2022    Procedure:  SECTION;  Surgeon: Gina Pascual MD;  Location: [facility] L+D    MYRINGOTOMY, INSERT TUBE BILATERAL, COMBINED      MYRINGOTOMY, INSERT TUBE BILATERAL, COMBINED         OB History    Para Term  AB Living   7 2 0 2 4 2   SAB IAB Ectopic Multiple Live Births   4 0 0 0 2      # Outcome Date GA Lbr Karl/2nd Weight Sex Delivery Anes PTL Lv   7 Current            6  22 34w0d  2.18 kg (4 lb 12.9 oz) M CS-LTranv Spinal Y CLARISSA      Name: LYNSEYMALE-MARINA      Apgar1: 8  Apgar5: 8   5 SAB 2021           4  19 34w5d  2.34 kg (5 lb 2.5 oz) F Vag-Spont   CLARISSA      Name: Papo      Apgar1: 8  Apgar5: 9   3 SAB 13 6w0d          2 SAB  4w0d          1 SAB               Obstetric Comments    labor (SROM at 34+4 in first pregnancy and 34+0 in second),  birth, 3rd or 4th degree perineal laceration,  mood disorder (both PPD and PPA), and  (SROM at 34+0 and breech position)      "          GYN History- Denies Abnormal Pap Smears                        History of STI: Teen - chlamydia     I personally reviewed the past social/family/medical and surgical history on the date of service.       ROS: 10 point ROS neg other than the symptoms noted above in the HPI.    Objective  /66   Pulse 92   Ht 1.651 m (5' 5\")   Wt 66.9 kg (147 lb 6.4 oz)   LMP 2023   BMI 24.53 kg/m     EXAM:  GENERAL:  Pleasant pregnant female, alert, cooperative and well groomed.  SKIN:  Warm and dry, without lesions or rashes  HEAD: Symmetrical features.  MOUTH:  Buccal mucosa pink, moist without lesions.  Teeth in poor repair.    NECK:  Thyroid without enlargement and nodules.  Lymph nodes not palpable.   LUNGS:  Clear to auscultation.  BREAST: deferred. No concerns.   HEART:  RRR without murmur.  ABDOMEN: Soft without masses , tenderness or organomegaly.  No CVA tenderness.  Uterus palpable at size equal to dates.  Well healed scar from  section. Fetal heart tones present.  MUSCULOSKELETAL:  Full range of motion  EXTREMITIES:  No edema. No significant varicosities.   PELVIC EXAM: deferred. No concerns.   GC/CHLAMYDIA CULTURE OBTAINED: previously negative.     Assessment/Plan  28 year old , 25w6d weeks of pregnancy with YOLANDA of 2024 by US NOT c/w LMP.   Intrauterine pregnancy 25w6d size consistent with dates  Genetic Screening: completed and all WNL     Orders Placed This Encounter   Procedures    Midline Placement    US OB >14 Weeks Follow Up    Hepatitis B Surface Antibody    25- [place]-Vitamin D    Varicella Zoster Virus Antibody IgG       - Oriented to Practice, types of care, and how to reach a provider.  Pt prefers MD team    - Educational handout on the prevention of infections diseases during pregnancy provided.  - Reviewed healthy diet and foods to avoid, exercise and activity during pregnancy; avoiding exposure to toxoplasmosis; and maintenance of a generally healthy lifestyle. "   - Discussed the harms, benefits, side effects and alternative therapies for current prescribed and OTC medications. Patient was encouraged to start prenatal vitamins as tolerated.    - Reviewed use of triage nurse line and contacting the on-call provider after hours for an urgent need such as fever, vagina bleeding, bladder or vaginal infection, rupture of membranes,  or term labor.      - Reviewed best evidence for: weight gain for her weight and height for pregnancy:  Based on pre-pregnancy Body mass index is 24.53 kg/m . RECOMMENDED WEIGHT GAIN: 25-35 lbs.    - Patient was sent to lab for routine OB labs including Hep B antibody, Vit D. Varicella, and UC.   - Reviewed low risk for gestational diabetes d/t No known risk factors for GDM. 1hr GCT 92.     - Reviewed high risk for pre term labor.  testing with q4 week growth and weekly BPP @ 32 weeks. Orders placed for next growth US.    - Reviewed Moderate Risk for Pre Eclampsia d/t meets two or more of the moderate risk factors including Personal history factors (e.g., low birthweight or small for gestational age, previous adverse pregnancy outcome, >10-year pregnancy interval)  and will consider 81mg aspirin daily after 12 weeks. Plan to discuss at next visit.     -IV Benadryl for migraines ordered, discussed seeing Dr. Clement for further migraine management if needed.  - Thoroughly discussed r/b/a to midline IV placement. Strongly desires to move forward. Orders placed for Midline IV.     - Pregnancy concerns to be addressed by provider at next OB visit include: LDA initiation, weekly BPPs to be scheduled, Rhogam @ next visit for Rh negative status, TOLAC vs. RPCS     - All pt's and partner's questions discussed and answered.  Pt verbalized understanding of and agreement to plan of care.     - Continue scheduled prenatal care and prn if questions or concerns  - RTC 2 weeks.     Loren GIBSON, am serving as a scribe to document services  "personally performed by Maggie Egna CNM based on the provider's statements to me.\" - Loren CABRAL     The encounter was performed by me and scribed by the SNM. The scribed note accurately reflects my personal services and decisions made by me.     Maggie Egan, JACOB BEASLEYM    "

## 2023-10-20 NOTE — PROGRESS NOTES
Please refer to ultrasound report under 'Imaging' Studies of 'Chart Review' tabs.    Ignacio Gilbert M.D.

## 2023-10-20 NOTE — PROGRESS NOTES
Clinic Care Coordination Contact  Clinic Care Coordination Contact  OUTREACH    Referral Information: Allyn Anand MD     Chief Complaint   Patient presents with    Clinic Care Coordination - Initial      Universal Utilization:    Utilization      No Show Count (past year)  0             ED Visits  0             Hospital Admissions  0                    Current as of: 10/19/2023  6:24 PM              Clinical Concerns:  Current Medical Concerns: Did not discuss.    Current Behavioral Concerns: CC contacted patient to follow up on referral placed by Dr. Anand. Patient denies needing any social work support at this time. She agreed to reach out should any social service needs arise.      Education Provided to patient: Patient was provided with the  Mental Health resource brochure.      Medication Management:  Medication review status: Did not discuss.     Lifestyle & Psychosocial Needs:    Social Determinants of Health     Food Insecurity: Not on file   Depression: Not at risk (10/18/2023)    PHQ-2     PHQ-2 Score: 2   Recent Concern: Depression - At risk (2023)    PHQ-2     PHQ-2 Score: 4   Housing Stability: Not on file   Tobacco Use: Medium Risk (10/18/2023)    Patient History     Smoking Tobacco Use: Former     Smokeless Tobacco Use: Former     Passive Exposure: Yes   Financial Resource Strain: Not on file   Alcohol Use: Not on file   Transportation Needs: Not on file   Physical Activity: Not on file   Interpersonal Safety: Not on file   Stress: Not on file   Social Connections: Not on file     The patient consented via Verbal consent to have contact information and resources sent via email in an unencrypted manner.     Patient/Caregiver understanding: Patient verbalized understanding, engaged in AIDET communication during patient encounter.    Future Appointments                Today [id number] M [facility] Maternal Fetal Medicine [facility]    Today UR KESHAV JOHNSON  [facility] Maternal Fetal [facility]    Today Maggie Egan APRN CNM [facility]    In 5 days Beny Luevano RPH [facility]    In 2 weeks Odebunmi, Tolulope Oluwadamilola, MD [facility]          Plan: Patient will reach out with any questions or concerns.    [facility] will do no further outreaches at this time.    Loren Shaw, [facility]  Clinic Care Coordination  [facility]  [email or web address]  [phone]

## 2023-10-20 NOTE — NURSING NOTE
Depression Response    Patient completed the PHQ-9 assessment for depression and scored >9? Yes  Question 9 on the PHQ-9 was positive for suicidality? Yes  Does patient have current mental health provider? Yes    Is this a virtual visit? No    I personally notified the following: visit provider  has a provider a lot of this is due to extreme nausea in pregnancy and unable to take medications.   Talking with provider today  coming up with a new plan of care.

## 2023-10-20 NOTE — PROGRESS NOTES
Depression Screening Follow-up        10/20/2023    12:07 PM   PHQ   PHQ-9 Total Score 17   Q9: Thoughts of better off dead/self-harm past 2 weeks Several days             10/20/2023     1:05 PM   C-SSRS (Brief Tyler)   Within the last month, have you wished you were dead or wished you could go to sleep and not wake up? Yes   Within the last month, have you had any actual thoughts of killing yourself? No   Within the last month, have you ever done anything, started to do anything, or prepared to do anything to end your life? No         Follow Up         Patient reports feeling safe and well supported. Followed by Psychiatry and sees therapist weekly. Reports this is due to her hyperemesis and inability to keep her Lexapro down. Discussed calling emergency services / national suicide hotline with thoughts of self harm, patient verbalized understanding. Guille completed with Julisa BANUELOS, plan for patient to follow up with MH provider. Provider and patient verbalized comfort with plan.    Follow Up Actions Taken:  Crisis resource information provided in the After Visit Summary  Referred patient back to mental health provider        I have discussed the results of the [place] Screening and proposed plan of care with both the provider and the patient.  The patient agrees with the safety plan and follow up. The provider acknowledged their next steps to review follow up actions and safety plans with the patient.    Nu Norman RN

## 2023-10-23 LAB
VZV IGG SER QL IA: 90.1 INDEX
VZV IGG SER QL IA: NORMAL

## 2023-10-24 PROBLEM — O09.899 MATERNAL VARICELLA, NON-IMMUNE: Status: ACTIVE | Noted: 2023-10-24

## 2023-10-24 PROBLEM — Z78.9 NONIMMUNE TO HEPATITIS B VIRUS: Status: ACTIVE | Noted: 2023-10-24

## 2023-10-24 PROBLEM — Z28.39 MATERNAL VARICELLA, NON-IMMUNE: Status: ACTIVE | Noted: 2023-10-24

## 2023-10-25 ENCOUNTER — VIRTUAL VISIT (OUTPATIENT)
Dept: PHARMACY | Facility: CLINIC | Age: 28
End: 2023-10-25
Payer: COMMERCIAL

## 2023-10-25 DIAGNOSIS — F41.8 MIXED ANXIETY AND DEPRESSIVE DISORDER: ICD-10-CM

## 2023-10-25 DIAGNOSIS — F32.A DEPRESSIVE DISORDER: ICD-10-CM

## 2023-10-25 DIAGNOSIS — O21.0 HYPEREMESIS GRAVIDARUM: Primary | ICD-10-CM

## 2023-10-25 PROCEDURE — 99605 MTMS BY PHARM NP 15 MIN: CPT | Performed by: PHARMACIST

## 2023-10-25 PROCEDURE — 99607 MTMS BY PHARM ADDL 15 MIN: CPT | Performed by: PHARMACIST

## 2023-10-25 NOTE — Clinical Note
Hello,  Recommended she try crushing her escitalopram. Ideally controlling the vomiting would be most helpful. I would recommend a trial of doxylamine-pyridoxine to treat the hyperemesis gravidarum..  Please let me know what questions you have for me,  Beny Luevano, Pharm. D., [facility] Medication Therapy Management Pharmacist Direct Voicemail: [phone]

## 2023-10-25 NOTE — PROGRESS NOTES
Medication Therapy Management (MTM) Encounter    ASSESSMENT:                            Medication Adherence/Access: No issues identified    Depression/Anxiety:  Okay to try crushing the escitalopram to see if she can keep it down. She did not think switching to a liquid formulation would be more helpful.     Hyperemisis Gravidarum:   Per [employer], could consider doxylamine-pyridoxine.   PLAN:                            Okay to crush escitalopram  Consider doxylamine-pyridoxine for hyperemesis gravidarum.     Follow-up: Return if symptoms worsen or fail to improve.    SUBJECTIVE/OBJECTIVE:                          Myrtle Strickland is a 28 year old female called for an initial visit. She was referred to me from Tolulope Oluwadamilola MD.      Reason for visit: Medication Question.    Allergies/ADRs: Reviewed in chart  Past Medical History: Reviewed in chart  Tobacco: She reports that she quit smoking about 7 months ago. Her smoking use included cigarettes. She has been exposed to tobacco smoke. She has quit using smokeless tobacco.  Alcohol: none    Medication Adherence/Access: no issues reported    Depression/Anxiety:  Escitalopram 10mg once daily.     Patient reports that since she has hyperemesis gravidarum she is having trouble keeping it down. Has not been able   Patient reports no current medication side effects.        9/8/2023     9:22 AM 10/18/2023     2:18 PM 10/20/2023    12:07 PM   PHQ-9 SCORE   PHQ-9 Total Score MyChart 17 (Moderately severe depression) 16 (Moderately severe depression)    PHQ-9 Total Score 17 16 17         9/27/2022    10:31 AM 8/23/2023    12:47 PM 10/18/2023     2:19 PM   SARAH-7 SCORE   Total Score 18 (severe anxiety) 19 (severe anxiety) 13 (moderate anxiety)   Total Score 18 19 13         Past Medication Trials    Medications Max dose Dates/Duration Discontinuation Reason Other Notes   Hydroxyzine    Was prescribed for itching   sertraline   Did not help          Hyperemisis Gravidarum:    Has Zofran IV and Reglan IV that are helpful. Reglan helps but causes anxiety.     Today's Vitals: LMP 04/12/2023   ----------------      I spent 30 minutes with this patient today. I offer these suggestions for consideration by Tolulope Oluwadamilola MD. A copy of the visit note was provided to the patient's provider(s).    A summary of these recommendations was sent via [employer].    Beny Luevano Pharm. D., [facility]  Medication Therapy Management Pharmacist      Telemedicine Visit Details  Type of service:  Telephone visit  Start Time:  1 pm  End Time:  1:30 pm       Medication Therapy Recommendations  Depressive disorder    Current Medication: escitalopram (LEXAPRO) 10 MG tablet   Rationale: Dosage form inappropriate - Ineffective medication - Effectiveness   Recommendation: Provide Education   Status: Patient Agreed - Adherence/Education         Hyperemesis gravidarum    Rationale: Untreated condition - Needs additional medication therapy - Indication   Recommendation: DOXYLAMINE-PYRIDOXINE   Status: Contact Provider - Awaiting Response

## 2023-10-27 NOTE — PATIENT INSTRUCTIONS
"Recommendations from today's MTM visit:                                                    MTM (medication therapy management) is a service provided by a clinical pharmacist designed to help you get the most of out of your medicines.   Today we reviewed what your medicines are for, how to know if they are working, that your medicines are safe and how to make your medicine regimen as easy as possible.    Okay to crush escitalopram  Consider doxylamine-pyridoxine for hyperemesis gravidarum.     Follow-up: Return if symptoms worsen or fail to improve.    It was great speaking with you today.  I value your experience and would be very thankful for your time in providing feedback in our clinic survey. In the next few days, you may receive an email or text message from [employer] with a link to a survey related to your  clinical pharmacist.\"     To schedule another MTM appointment, please call the clinic directly or you may call the MTM scheduling line at [phone] or toll-free at [phone].     My Clinical Pharmacist's contact information:                                                      Please feel free to contact me with any questions or concerns you have.      Beny Luevano, Pharm. D., [facility]  Medication Therapy Management Pharmacist    "

## 2023-10-30 ENCOUNTER — TELEPHONE (OUTPATIENT)
Dept: PHARMACY | Facility: CLINIC | Age: 28
End: 2023-10-30
Payer: COMMERCIAL

## 2023-10-30 NOTE — CONFIDENTIAL NOTE
Called patient to inquire again about past medication trials. Patient reports she does not remember which medications she was taking because she had not taken them since she was 16. Listed off names of medications to her:    Wellbutrin reports it sounds familiar but maybe didn't work well  Sertraline did not work  Paroxetine - hasn't used  Fluoxetine - hasn't used  Venlafaxine hadn't used  Duloxetine -caused a psychotic break/inpatient stay  Abilify - was taken in combination with duloxetine and was taken off at the same time.     She reports she has not tried any other medications.     Beny Luevano, Pharm. D., BCACP  Medication Therapy Management Pharmacist

## 2023-11-03 ENCOUNTER — TELEPHONE (OUTPATIENT)
Dept: OBGYN | Facility: CLINIC | Age: 28
End: 2023-11-03

## 2023-11-03 ENCOUNTER — PRENATAL OFFICE VISIT (OUTPATIENT)
Dept: OBGYN | Facility: CLINIC | Age: 28
End: 2023-11-03
Attending: ADVANCED PRACTICE MIDWIFE
Payer: COMMERCIAL

## 2023-11-03 ENCOUNTER — LAB (OUTPATIENT)
Dept: LAB | Facility: CLINIC | Age: 28
End: 2023-11-03
Payer: COMMERCIAL

## 2023-11-03 VITALS
HEIGHT: 65 IN | WEIGHT: 149.5 LBS | BODY MASS INDEX: 24.91 KG/M2 | HEART RATE: 70 BPM | SYSTOLIC BLOOD PRESSURE: 107 MMHG | DIASTOLIC BLOOD PRESSURE: 70 MMHG

## 2023-11-03 DIAGNOSIS — O09.90 HIGH-RISK PREGNANCY, UNSPECIFIED TRIMESTER: ICD-10-CM

## 2023-11-03 DIAGNOSIS — Z29.13 NEED FOR RHOGAM DUE TO RH NEGATIVE MOTHER: ICD-10-CM

## 2023-11-03 DIAGNOSIS — O09.90 HIGH-RISK PREGNANCY, UNSPECIFIED TRIMESTER: Primary | ICD-10-CM

## 2023-11-03 PROBLEM — Z87.898 HISTORY OF SUBSTANCE USE: Status: RESOLVED | Noted: 2023-10-17 | Resolved: 2023-11-03

## 2023-11-03 LAB
BASOPHILS # BLD AUTO: 0 10E3/UL (ref 0–0.2)
BASOPHILS NFR BLD AUTO: 0 %
EOSINOPHIL # BLD AUTO: 0.1 10E3/UL (ref 0–0.7)
EOSINOPHIL NFR BLD AUTO: 1 %
ERYTHROCYTE [DISTWIDTH] IN BLOOD BY AUTOMATED COUNT: 13.2 % (ref 10–15)
HCT VFR BLD AUTO: 37 % (ref 35–47)
HGB BLD-MCNC: 12.1 G/DL (ref 11.7–15.7)
IMM GRANULOCYTES # BLD: 0.1 10E3/UL
IMM GRANULOCYTES NFR BLD: 1 %
LYMPHOCYTES # BLD AUTO: 1.3 10E3/UL (ref 0.8–5.3)
LYMPHOCYTES NFR BLD AUTO: 14 %
MCH RBC QN AUTO: 28.2 PG (ref 26.5–33)
MCHC RBC AUTO-ENTMCNC: 32.7 G/DL (ref 31.5–36.5)
MCV RBC AUTO: 86 FL (ref 78–100)
MONOCYTES # BLD AUTO: 0.6 10E3/UL (ref 0–1.3)
MONOCYTES NFR BLD AUTO: 7 %
NEUTROPHILS # BLD AUTO: 7.4 10E3/UL (ref 1.6–8.3)
NEUTROPHILS NFR BLD AUTO: 77 %
NRBC # BLD AUTO: 0 10E3/UL
NRBC BLD AUTO-RTO: 0 /100
PLATELET # BLD AUTO: 272 10E3/UL (ref 150–450)
RBC # BLD AUTO: 4.29 10E6/UL (ref 3.8–5.2)
T PALLIDUM AB SER QL: NONREACTIVE
VIT D+METAB SERPL-MCNC: 13 NG/ML (ref 20–50)
WBC # BLD AUTO: 9.5 10E3/UL (ref 4–11)

## 2023-11-03 PROCEDURE — 36415 COLL VENOUS BLD VENIPUNCTURE: CPT

## 2023-11-03 PROCEDURE — 250N000011 HC RX IP 250 OP 636: Performed by: ADVANCED PRACTICE MIDWIFE

## 2023-11-03 PROCEDURE — 96372 THER/PROPH/DIAG INJ SC/IM: CPT | Performed by: ADVANCED PRACTICE MIDWIFE

## 2023-11-03 PROCEDURE — G0463 HOSPITAL OUTPT CLINIC VISIT: HCPCS | Performed by: ADVANCED PRACTICE MIDWIFE

## 2023-11-03 PROCEDURE — 82306 VITAMIN D 25 HYDROXY: CPT

## 2023-11-03 PROCEDURE — 86780 TREPONEMA PALLIDUM: CPT

## 2023-11-03 PROCEDURE — 99207 PR PRENATAL VISIT: CPT | Performed by: ADVANCED PRACTICE MIDWIFE

## 2023-11-03 PROCEDURE — 85025 COMPLETE CBC W/AUTO DIFF WBC: CPT

## 2023-11-03 RX ADMIN — RHO(D) IMMUNE GLOBULIN (HUMAN) 300 MCG: 1500 SOLUTION INTRAMUSCULAR at 12:25

## 2023-11-03 NOTE — TELEPHONE ENCOUNTER
Called infusion center per [place] [facility] to add Benadryl 50 mg daily to home infusion orders. Called patient to confirm she would like this for migraines, pt confirmed. Called home infusion and spoke with pharmacist, verbal order given.

## 2023-11-03 NOTE — NURSING NOTE
Clinic Administered Medication Documentation        Patient was given Rhogam. Prior to medication administration, verified patient's identity using patient s name and date of birth. Please see MAR and medication order for additional information. Patient instructed to report any adverse reaction to staff immediately.    Vial/Syringe: Single dose vial. Was entire vial of medication used? Yes

## 2023-11-03 NOTE — PROGRESS NOTES
Subjective:  28 year old, , 27w6d, presents for prenatal visit with partner.   Reports normal fetal movement. Denies leaking of fluid, vaginal bleeding, or abnormal discharge.   The patient presents with the following concerns:    - Persistent nausea/vomiting, has midline in place. Receiving 2L of LR/daily, zofran and reglan daily.  Feels she is soemtimes able to eat noodles, able to drink soda (root beer, mountain dew) and occasional water or powerade.  Infusion center did not receive order for benadryl.   - Reports contractions for the past week, q 1-1.25 hours, difficult to talk through and has to pause.  Has not changed in pattern.  Worse after walking/physical activity.    - Weekly BPPs at 32w and growth q 4 weeks   - Hep B NI, Tdap   - B neg, needs Rhogam   - Desires TOLAC   - Completed GCT at 24w4d, normal results.        2023     9:22 AM 10/18/2023     2:18 PM 10/20/2023    12:07 PM   PHQ-9 SCORE   PHQ-9 Total Score MyChart 17 (Moderately severe depression) 16 (Moderately severe depression)    PHQ-9 Total Score 17 16 17     Education completed today includes [facility] handout, postpartum mood disorders, breastfeeding, pain management options, labor support, postpartum contraception.   Birth preferences reviewed: Unmedicated, TOLAC  Labor support: Partner and mother   Feeding plans:  Breastfeeding  Contraception planned:  undecided.  Considering PPTL.  Would consider IUD but does not want to go through painful placement again, discussed option for placement under anesthesia.  The following labs were ordered today:     CBC w platelets, Vitamin D, and Anti-treponema  Water birth consent form was not given.  Discussed hydrotherapy, but would not be candidate for water birth d/t hx of .    Blood type:   Antibody Screen   Date Value Ref Range Status   2023 Negative Negative Final     Rhogam was given.  TDAP  was not given.    A/P:  Encounter Diagnoses   Name Primary?    High-risk  pregnancy, unspecified trimester Yes    Need for rhogam due to Rh negative mother      Orders Placed This Encounter   Procedures    US OB Follow Up >14 Weeks    Vitamin D Deficiency    Treponema Abs w Reflex to RPR and Titer    CBC with Platelets & Differential     Orders Placed This Encounter   Medications    DISCONTD: rho,D, immune globulin (RHOGAM) 300 MCG injection     Sig: Inject 300 mcg into the muscle once for 1 dose     Dispense:  300 mcg     Refill:  0    rho(D) immune globulin (RHOGAM) injection 300 mcg     Discussed possibility of [place] Villegas contractions, but would be reasonable to present to Birthplace for evaluation d/t strength of contractions.  Pt unable to go to Birthplace for evaluation at this time, but may consider later.  Discussed low threshold for evaluation d/t hx of PTB. Encouraged rest, hydration, warm soaks, etc.  Rhogam given today  CBC, treponema, and Vit D collected today  Pt agreeable to Tdap and Hep B vaccine at future visits.  TOLAC consent reviewed and signed  Growth ultrasound at next visit  Discussed signing postpartum tubal ligation consent at next visit, so she will have option if PPTL is desired at time of birth.   Continue scheduled prenatal care, RTC in 2 weeks    JACOB Mcfadden CNM    45 minutes spent on the date of the encounter doing chart review, history and exam, documentation and further activities as noted above.

## 2023-11-03 NOTE — PATIENT INSTRUCTIONS
Thank you for trusting us with your care!     If you need to contact us for questions about:  Symptoms, Scheduling & Medical Questions; Non-urgent (2-3 day response) Anushka message, Urgent (needing response today) [phone] (if after 3:30pm next day response)   Prescriptions: Please call your Pharmacy   Billing: Monica [phone] or [employer]:[phone]

## 2023-11-06 ENCOUNTER — TELEPHONE (OUTPATIENT)
Dept: OBGYN | Facility: CLINIC | Age: 28
End: 2023-11-06
Payer: COMMERCIAL

## 2023-11-06 PROBLEM — E55.9 VITAMIN D DEFICIENCY: Status: ACTIVE | Noted: 2023-11-06

## 2023-11-16 NOTE — PATIENT INSTRUCTIONS
Weeks 26 to 30 of Your Pregnancy: Care Instructions  You're starting your last trimester. You'll probably feel your baby moving around more. Your back may ache as your body gets used to your baby's size and length. Take care of yourself, and pay attention to what your body needs.    Talk to your doctor about getting the Tdap shot. It will help protect your  against whooping cough (pertussis). Also ask your doctor about flu and COVID-19 shots if you haven't had them yet. If your blood type is Rh negative, you may be given a shot of Rh immune globulin (such as RhoGAM). It can help prevent problems for your baby.   You may have Jewell-Villegas contractions. They are single or several strong contractions without a pattern. These are practice contractions but not the start of labor.   Be kind to yourself.     Take breaks when you're tired.  Change positions often. Don't sit for too long or stand for too long.  At work, rest during breaks if you can. If you don't get breaks, talk to your doctor about writing a letter to your employer to request them.  Avoid fumes, chemicals, and tobacco smoke.  Be sexual if you want to.     You may be interested in sex, or you may not. Everyone is different.  Sex is okay unless your doctor tells you not to.  Your belly can make it hard to find good positions for sex. [place] and explore.  Watch for signs of  labor.    These signs include:   Menstrual-like cramps. Or you may have pain or pressure in your pelvis that happens in a pattern.  About 6 or more contractions in an hour (even after rest and a glass of water).  A low, dull backache that doesn't go away when you change positions.  An increase or change in vaginal discharge.  Light vaginal bleeding or spotting.  Your water breaking.  Know what to do if you think you are having contractions.     Drink 1 or 2 glasses of water.  Lie down on your left side for at least an hour.  While on your side, feel the top of your  "belly to see if it's tight.  Write down your contractions for an hour. Time how long it is from the start of one contraction to the start of the next.  Call your doctor if you have regular contractions.  Follow-up care is a key part of your treatment and safety. Be sure to make and go to all appointments, and call your doctor if you are having problems. It's also a good idea to know your test results and keep a list of the medicines you take.  Where can you learn more?  Go to https://www.iCook.tw.net/patiented  Enter S999 in the search box to learn more about \"Weeks 26 to 30 of Your Pregnancy: Care Instructions.\"  Current as of: July 11, 2023               Content Version: 13.8    6817-2708 [employer].   Care instructions adapted under license by your healthcare professional. If you have questions about a medical condition or this instruction, always ask your healthcare professional. [employer] disclaims any warranty or liability for your use of this information.      "

## 2023-11-17 ENCOUNTER — PRENATAL OFFICE VISIT (OUTPATIENT)
Dept: OBGYN | Facility: CLINIC | Age: 28
End: 2023-11-17
Attending: REGISTERED NURSE
Payer: COMMERCIAL

## 2023-11-17 VITALS
HEIGHT: 65 IN | WEIGHT: 154.3 LBS | DIASTOLIC BLOOD PRESSURE: 79 MMHG | HEART RATE: 116 BPM | SYSTOLIC BLOOD PRESSURE: 119 MMHG | BODY MASS INDEX: 25.71 KG/M2

## 2023-11-17 DIAGNOSIS — Z67.91 RH NEGATIVE STATE IN ANTEPARTUM PERIOD: ICD-10-CM

## 2023-11-17 DIAGNOSIS — O26.899 RH NEGATIVE STATE IN ANTEPARTUM PERIOD: ICD-10-CM

## 2023-11-17 DIAGNOSIS — Z28.39 RUBELLA NON-IMMUNE STATUS, ANTEPARTUM: ICD-10-CM

## 2023-11-17 DIAGNOSIS — O09.90 HIGH-RISK PREGNANCY, UNSPECIFIED TRIMESTER: Primary | ICD-10-CM

## 2023-11-17 DIAGNOSIS — Z87.898 HISTORY OF SUBSTANCE USE DISORDER: ICD-10-CM

## 2023-11-17 DIAGNOSIS — Z98.891 HISTORY OF CESAREAN DELIVERY: ICD-10-CM

## 2023-11-17 DIAGNOSIS — O09.899 RUBELLA NON-IMMUNE STATUS, ANTEPARTUM: ICD-10-CM

## 2023-11-17 DIAGNOSIS — N30.00 ACUTE CYSTITIS WITHOUT HEMATURIA: ICD-10-CM

## 2023-11-17 PROCEDURE — 99207 PR PRENATAL VISIT: CPT | Performed by: REGISTERED NURSE

## 2023-11-17 PROCEDURE — 90471 IMMUNIZATION ADMIN: CPT

## 2023-11-17 PROCEDURE — 87086 URINE CULTURE/COLONY COUNT: CPT | Performed by: REGISTERED NURSE

## 2023-11-17 PROCEDURE — G0463 HOSPITAL OUTPT CLINIC VISIT: HCPCS | Performed by: REGISTERED NURSE

## 2023-11-17 PROCEDURE — G0463 HOSPITAL OUTPT CLINIC VISIT: HCPCS | Mod: 25 | Performed by: REGISTERED NURSE

## 2023-11-17 PROCEDURE — 90715 TDAP VACCINE 7 YRS/> IM: CPT

## 2023-11-17 PROCEDURE — 250N000011 HC RX IP 250 OP 636

## 2023-11-17 RX ORDER — NITROFURANTOIN 25; 75 MG/1; MG/1
100 CAPSULE ORAL 2 TIMES DAILY
Qty: 14 CAPSULE | Refills: 0 | Status: SHIPPED | OUTPATIENT
Start: 2023-11-17 | End: 2023-11-24

## 2023-11-17 NOTE — PROGRESS NOTES
"Subjective:      28 year old  at 29w6d presentst for a routine prenatal appointment.    no vaginal bleeding or leakage of fluid. Intermittent painful contractions- was checked on Monday at  hospital and cervix was closed. Reports regular fetal movement.       No HA, visual changes, RUQ or epigastric pain.   Patient concerns: wondering about IOL.     - Had new midline IV placed 2 days ago as the other one was leaking. 2L IV fluids daily.     - Reviewed EOB labs with patient. WNL except low Vit D.   - Reviewed TDAP due today: patient accepts.   - S/p Rhogam 11/3  - TOLAC consent signed 11/3  - F/U Growth US scheduled 2023, needs weekly BPP @ 32 weeks.   - PPTL desired. Paperwork signed today.   - IV benadryl added to home infusion orders 11/3 for migraines: She reports this is helping significantly.   - Mood? Feels Lexapro is helping when it stays down.     - Reviewed labs drawn at - UA suspicious for UTI with large amounts of WBC esterase. Still having symptoms. Was not given oral abx and no culture was collected or reflexed.     Objective:  Vitals:    23 1128   BP: 119/79   Pulse: 116   Weight: 70 kg (154 lb 4.8 oz)   Height: 1.651 m (5' 5\")   , see ob flowsheet  Assessment/Plan     Encounter Diagnoses   Name Primary?    High-risk pregnancy, unspecified trimester Yes    Rh negative state in antepartum period     Rubella non-immune status, antepartum     History of substance use disorder     History of  delivery     Acute cystitis without hematuria      Orders Placed This Encounter   Procedures    US OB Fetal Biophys Prf wo NonStrs Singls Sgl    US OB Fetal Biophys Prf wo NonStrs Singls Sgl    US OB Fetal Biophys Prf wo NonStrs Singls Sgl    US OB Fetal Biophys Prf wo NonStrs Singls Sgl    TDAP VACCINE (Adacel, Boostrix)  [[id number]]     Orders Placed This Encounter   Medications    nitroFURantoin macrocrystal-monohydrate (MACROBID) 100 MG capsule     Sig: Take 1 capsule (100 mg) by mouth " 2 times daily for 7 days     Dispense:  14 capsule     Refill:  0     Blood type: B NEG   Antibody Screen   Date Value Ref Range Status   06/07/2023 Negative Negative Final    Rhogam was given previously.     - Orders placed for weekly BPPs @ 32 weeks. Patient to schedule.   - Will treat for UTI, pending UC results. RX sent to pharmacy on file.   - Discussed elective IOL would be 39 weeks at the earliest. Will consider to discuss given continued hyperemesis and based on ultrasound results.     - Reviewed total weight gain, encouraged continued healthy diet and exercise.  Reviewed importance of daily fetal kick count and why/how to contact provider.    - Reviewed why/how to contact provider if headache/visual changes/RUQ or epigastric pain, decreased fetal movement, vaginal bleeding, leakage of fluid or more than 4 contractions in an hour.     Patient education/orders or handouts today:  PTL signs/symptoms and TDAP  Reviewed GBS screening at 36-37wks.    Return to clinic in 2 weeks and prn if questions or concerns.     JACOB Betancur CNM

## 2023-11-18 LAB — BACTERIA UR CULT: NORMAL

## 2023-11-30 ENCOUNTER — ANCILLARY PROCEDURE (OUTPATIENT)
Dept: ULTRASOUND IMAGING | Facility: CLINIC | Age: 28
End: 2023-11-30
Attending: REGISTERED NURSE
Payer: COMMERCIAL

## 2023-11-30 DIAGNOSIS — O09.90 HIGH-RISK PREGNANCY, UNSPECIFIED TRIMESTER: ICD-10-CM

## 2023-11-30 PROCEDURE — 76816 OB US FOLLOW-UP PER FETUS: CPT

## 2023-11-30 PROCEDURE — 76819 FETAL BIOPHYS PROFIL W/O NST: CPT | Mod: 26 | Performed by: OBSTETRICS & GYNECOLOGY

## 2023-11-30 PROCEDURE — 76819 FETAL BIOPHYS PROFIL W/O NST: CPT

## 2023-11-30 PROCEDURE — 76816 OB US FOLLOW-UP PER FETUS: CPT | Mod: 26 | Performed by: OBSTETRICS & GYNECOLOGY

## 2023-12-01 ENCOUNTER — PRENATAL OFFICE VISIT (OUTPATIENT)
Dept: OBGYN | Facility: CLINIC | Age: 28
End: 2023-12-01
Attending: REGISTERED NURSE
Payer: COMMERCIAL

## 2023-12-01 VITALS
HEIGHT: 65 IN | DIASTOLIC BLOOD PRESSURE: 79 MMHG | SYSTOLIC BLOOD PRESSURE: 118 MMHG | BODY MASS INDEX: 25.49 KG/M2 | HEART RATE: 80 BPM | WEIGHT: 153 LBS

## 2023-12-01 DIAGNOSIS — O09.899 RUBELLA NON-IMMUNE STATUS, ANTEPARTUM: ICD-10-CM

## 2023-12-01 DIAGNOSIS — O09.90 HIGH-RISK PREGNANCY, UNSPECIFIED TRIMESTER: Primary | ICD-10-CM

## 2023-12-01 DIAGNOSIS — F41.9 ANXIETY DISORDER, UNSPECIFIED TYPE: ICD-10-CM

## 2023-12-01 DIAGNOSIS — Z87.898 HISTORY OF SUBSTANCE USE DISORDER: ICD-10-CM

## 2023-12-01 DIAGNOSIS — Z98.891 HISTORY OF CESAREAN DELIVERY: ICD-10-CM

## 2023-12-01 DIAGNOSIS — Z67.91 RH NEGATIVE STATE IN ANTEPARTUM PERIOD: ICD-10-CM

## 2023-12-01 DIAGNOSIS — O26.899 RH NEGATIVE STATE IN ANTEPARTUM PERIOD: ICD-10-CM

## 2023-12-01 DIAGNOSIS — Z28.39 RUBELLA NON-IMMUNE STATUS, ANTEPARTUM: ICD-10-CM

## 2023-12-01 PROCEDURE — G0463 HOSPITAL OUTPT CLINIC VISIT: HCPCS | Performed by: REGISTERED NURSE

## 2023-12-01 PROCEDURE — 99207 PR PRENATAL VISIT: CPT | Performed by: REGISTERED NURSE

## 2023-12-01 RX ORDER — HYDROXYZINE PAMOATE 25 MG/1
25 CAPSULE ORAL 3 TIMES DAILY PRN
Qty: 90 CAPSULE | Refills: 0 | Status: ON HOLD | OUTPATIENT
Start: 2023-12-01 | End: 2023-12-08

## 2023-12-01 ASSESSMENT — ANXIETY QUESTIONNAIRES
GAD7 TOTAL SCORE: 20
3. WORRYING TOO MUCH ABOUT DIFFERENT THINGS: NEARLY EVERY DAY
1. FEELING NERVOUS, ANXIOUS, OR ON EDGE: NEARLY EVERY DAY
2. NOT BEING ABLE TO STOP OR CONTROL WORRYING: NEARLY EVERY DAY
6. BECOMING EASILY ANNOYED OR IRRITABLE: NEARLY EVERY DAY
GAD7 TOTAL SCORE: 20
5. BEING SO RESTLESS THAT IT IS HARD TO SIT STILL: NEARLY EVERY DAY
7. FEELING AFRAID AS IF SOMETHING AWFUL MIGHT HAPPEN: MORE THAN HALF THE DAYS

## 2023-12-01 ASSESSMENT — PATIENT HEALTH QUESTIONNAIRE - PHQ9
SUM OF ALL RESPONSES TO PHQ QUESTIONS 1-9: 21
5. POOR APPETITE OR OVEREATING: NEARLY EVERY DAY

## 2023-12-01 NOTE — PROGRESS NOTES
"Subjective:      28 year old  at 31w6d presentst for a routine prenatal appointment.    no vaginal bleeding or leakage of fluid.    Reports regular fetal movement.      No HA, visual changes, RUQ or epigastric pain.     Reviewed EOB labs with patient.  Reviewed TDAP previously given  US results : BPP 8/8, , cephalic, MVP 5.1cm, EFW 53%.     Needs weekly BPPs scheduled.     Patient concerns:   - Feeling a little better. Will have midline IV removed next week. 1-2L per day, more days with only 1L. Still doing daily IV benadryl and zofran. Limiting Reglan because it impacts her anxiety.     - Bouts of contractions for the last week at night. They will be painful every 10 minutes x2 hours and then stop. Not associated with vaginal bleeding, loss of mucous plug or LOF.   - Discussed PHQ-9 score: 21 (+question #9); SARAH-7 score 20: Feeling significant anxiety over the impending delivery and the possibility of another  section as she strongly desires a vaginal delivery. Lexapro has been helping when she is able to keep it down. Open to trying hydroxyzine. She says intermittent suicidal ideation is her baseline and she does not have any plans to carry out suicide. She feels well supported by her partner who is with her today. She has emergency phone numbers and knows to present to ED if symptoms worsen.     Objective:  Vitals:    23 1157   BP: 118/79   Pulse: 80   Weight: 69.4 kg (153 lb)   Height: 1.651 m (5' 5\")   , see ob flowsheet  Assessment/Plan     Encounter Diagnoses   Name Primary?    High-risk pregnancy, unspecified trimester Yes    Rubella non-immune status, antepartum     Rh negative state in antepartum period     History of substance use disorder     History of  delivery     Anxiety disorder, unspecified type      No orders of the defined types were placed in this encounter.    Orders Placed This Encounter   Medications    hydrOXYzine (VISTARIL) 25 MG capsule     Sig: " Take 1 capsule (25 mg) by mouth 3 times daily as needed for itching     Dispense:  90 capsule     Refill:  0     Blood type: B NEG   Antibody Screen   Date Value Ref Range Status   06/07/2023 Negative Negative Final    Rhogam was given previously.     - Reinforced calling if mental health symptoms worsen or presenting to ED if suicidal ideation worsens from her baseline. Will try PRN hydroxyzine for situational anxiety.     - Reviewed total weight gain, encouraged continued healthy diet and exercise.  Reviewed importance of daily fetal kick count and why/how to contact provider.    - Reviewed why/how to contact provider if headache/visual changes/RUQ or epigastric pain, decreased fetal movement, vaginal bleeding, leakage of fluid or more than 4 contractions in an hour. Reinforced calling if painful contractions occur again.    - Weekly BPPs until delivery.      Patient education/orders or handouts today:  PTL signs/symptoms  Reviewed GBS screening at 36-37wks.    Return to clinic in 1-2 weeks and prn if questions or concerns.     JACOB Betancur CNM

## 2023-12-01 NOTE — NURSING NOTE
Chief Complaint   Patient presents with    Prenatal Care     32     Depression Response    Patient completed the PHQ-9 assessment for depression and scored >9? Yes  Question 9 on the PHQ-9 was positive for suicidality? Yes    I personally notified the following: visit provider and clinic nurse

## 2023-12-07 ENCOUNTER — HOSPITAL ENCOUNTER (INPATIENT)
Facility: CLINIC | Age: 28
LOS: 2 days | Discharge: HOME | End: 2023-12-09
Attending: MIDWIFE | Admitting: STUDENT IN AN ORGANIZED HEALTH CARE EDUCATION/TRAINING PROGRAM
Payer: COMMERCIAL

## 2023-12-07 DIAGNOSIS — O09.90 HIGH-RISK PREGNANCY, UNSPECIFIED TRIMESTER: ICD-10-CM

## 2023-12-07 DIAGNOSIS — F41.9 ANXIETY DISORDER, UNSPECIFIED TYPE: ICD-10-CM

## 2023-12-07 DIAGNOSIS — O21.0 HYPEREMESIS GRAVIDARUM: Primary | ICD-10-CM

## 2023-12-07 PROBLEM — Z37.9 NORMAL LABOR: Status: ACTIVE | Noted: 2023-12-07

## 2023-12-07 LAB
ABO/RH(D): ABNORMAL
ALBUMIN UR-MCNC: NEGATIVE MG/DL
AMMONIA PLAS-SCNC: 14 UMOL/L (ref 11–51)
ANTIBODY SCREEN: POSITIVE
APPEARANCE UR: CLEAR
BILIRUB UR QL STRIP: NEGATIVE
CLUE CELLS: NORMAL
COLOR UR AUTO: ABNORMAL
CRYSTALS AMN MICRO: NORMAL
ERYTHROCYTE [DISTWIDTH] IN BLOOD BY AUTOMATED COUNT: 12.5 % (ref 10–15)
GLUCOSE UR STRIP-MCNC: NEGATIVE MG/DL
HCT VFR BLD AUTO: 32.2 % (ref 35–47)
HGB BLD-MCNC: 10.4 G/DL (ref 11.7–15.7)
HGB UR QL STRIP: NEGATIVE
KETONES UR STRIP-MCNC: NEGATIVE MG/DL
LEUKOCYTE ESTERASE UR QL STRIP: ABNORMAL
MCH RBC QN AUTO: 27.4 PG (ref 26.5–33)
MCHC RBC AUTO-ENTMCNC: 32.3 G/DL (ref 31.5–36.5)
MCV RBC AUTO: 85 FL (ref 78–100)
MUCOUS THREADS #/AREA URNS LPF: PRESENT /LPF
NITRATE UR QL: NEGATIVE
PH UR STRIP: 6.5 [PH] (ref 5–7)
PLATELET # BLD AUTO: 249 10E3/UL (ref 150–450)
RBC # BLD AUTO: 3.79 10E6/UL (ref 3.8–5.2)
RBC URINE: <1 /HPF
RUPTURE OF FETAL MEMBRANES BY ROM PLUS: NEGATIVE
SP GR UR STRIP: 1.02 (ref 1–1.03)
SPECIMEN EXPIRATION DATE: ABNORMAL
SQUAMOUS EPITHELIAL: 1 /HPF
TRICHOMONAS, WET PREP: NORMAL
UROBILINOGEN UR STRIP-MCNC: NORMAL MG/DL
WBC # BLD AUTO: 9.7 10E3/UL (ref 4–11)
WBC URINE: 1 /HPF
WBC'S/HIGH POWER FIELD, WET PREP: NORMAL
YEAST, WET PREP: NORMAL

## 2023-12-07 PROCEDURE — 258N000003 HC RX IP 258 OP 636

## 2023-12-07 PROCEDURE — 87210 SMEAR WET MOUNT SALINE/INK: CPT

## 2023-12-07 PROCEDURE — 82140 ASSAY OF AMMONIA: CPT

## 2023-12-07 PROCEDURE — 86870 RBC ANTIBODY IDENTIFICATION: CPT

## 2023-12-07 PROCEDURE — 258N000001 HC RX 258

## 2023-12-07 PROCEDURE — 86901 BLOOD TYPING SEROLOGIC RH(D): CPT

## 2023-12-07 PROCEDURE — 96372 THER/PROPH/DIAG INJ SC/IM: CPT

## 2023-12-07 PROCEDURE — 250N000013 HC RX MED GY IP 250 OP 250 PS 637

## 2023-12-07 PROCEDURE — 85027 COMPLETE CBC AUTOMATED: CPT

## 2023-12-07 PROCEDURE — 85461 HEMOGLOBIN FETAL: CPT

## 2023-12-07 PROCEDURE — 86850 RBC ANTIBODY SCREEN: CPT

## 2023-12-07 PROCEDURE — 81001 URINALYSIS AUTO W/SCOPE: CPT

## 2023-12-07 PROCEDURE — 86780 TREPONEMA PALLIDUM: CPT

## 2023-12-07 PROCEDURE — 87491 CHLMYD TRACH DNA AMP PROBE: CPT

## 2023-12-07 PROCEDURE — 250N000011 HC RX IP 250 OP 636

## 2023-12-07 PROCEDURE — 84112 EVAL AMNIOTIC FLUID PROTEIN: CPT | Performed by: MIDWIFE

## 2023-12-07 PROCEDURE — 120N000002 HC R&B MED SURG/OB UMMC

## 2023-12-07 PROCEDURE — 36415 COLL VENOUS BLD VENIPUNCTURE: CPT

## 2023-12-07 PROCEDURE — 87591 N.GONORRHOEAE DNA AMP PROB: CPT

## 2023-12-07 RX ORDER — PROCHLORPERAZINE 25 MG
25 SUPPOSITORY, RECTAL RECTAL EVERY 12 HOURS PRN
Status: CANCELLED | OUTPATIENT
Start: 2023-12-07

## 2023-12-07 RX ORDER — PENICILLIN G 3000000 [IU]/50ML
3 INJECTION, SOLUTION INTRAVENOUS EVERY 4 HOURS
Status: DISCONTINUED | OUTPATIENT
Start: 2023-12-08 | End: 2023-12-08

## 2023-12-07 RX ORDER — NALOXONE HYDROCHLORIDE 0.4 MG/ML
0.2 INJECTION, SOLUTION INTRAMUSCULAR; INTRAVENOUS; SUBCUTANEOUS
Status: DISCONTINUED | OUTPATIENT
Start: 2023-12-07 | End: 2023-12-09 | Stop reason: HOSPADM

## 2023-12-07 RX ORDER — BETAMETHASONE SODIUM PHOSPHATE AND BETAMETHASONE ACETATE 3; 3 MG/ML; MG/ML
12 INJECTION, SUSPENSION INTRA-ARTICULAR; INTRALESIONAL; INTRAMUSCULAR; SOFT TISSUE ONCE
Status: COMPLETED | OUTPATIENT
Start: 2023-12-07 | End: 2023-12-07

## 2023-12-07 RX ORDER — NALOXONE HYDROCHLORIDE 0.4 MG/ML
0.4 INJECTION, SOLUTION INTRAMUSCULAR; INTRAVENOUS; SUBCUTANEOUS
Status: DISCONTINUED | OUTPATIENT
Start: 2023-12-07 | End: 2023-12-09 | Stop reason: HOSPADM

## 2023-12-07 RX ORDER — CITRIC ACID/SODIUM CITRATE 334-500MG
30 SOLUTION, ORAL ORAL
Status: DISCONTINUED | OUTPATIENT
Start: 2023-12-07 | End: 2023-12-09 | Stop reason: HOSPADM

## 2023-12-07 RX ORDER — ONDANSETRON 4 MG/1
4 TABLET, ORALLY DISINTEGRATING ORAL EVERY 6 HOURS PRN
Status: CANCELLED | OUTPATIENT
Start: 2023-12-07

## 2023-12-07 RX ORDER — METHYLERGONOVINE MALEATE 0.2 MG/ML
200 INJECTION INTRAVENOUS
Status: DISCONTINUED | OUTPATIENT
Start: 2023-12-07 | End: 2023-12-09 | Stop reason: HOSPADM

## 2023-12-07 RX ORDER — DEXTROSE MONOHYDRATE 100 MG/ML
INJECTION, SOLUTION INTRAVENOUS CONTINUOUS
Status: DISCONTINUED | OUTPATIENT
Start: 2023-12-07 | End: 2023-12-08

## 2023-12-07 RX ORDER — NICOTINE POLACRILEX 4 MG
15-30 LOZENGE BUCCAL
Status: DISCONTINUED | OUTPATIENT
Start: 2023-12-07 | End: 2023-12-09 | Stop reason: HOSPADM

## 2023-12-07 RX ORDER — LIDOCAINE 40 MG/G
CREAM TOPICAL
Status: DISCONTINUED | OUTPATIENT
Start: 2023-12-07 | End: 2023-12-07

## 2023-12-07 RX ORDER — MISOPROSTOL 200 UG/1
400 TABLET ORAL
Status: DISCONTINUED | OUTPATIENT
Start: 2023-12-07 | End: 2023-12-09 | Stop reason: HOSPADM

## 2023-12-07 RX ORDER — KETOROLAC TROMETHAMINE 30 MG/ML
30 INJECTION, SOLUTION INTRAMUSCULAR; INTRAVENOUS
Status: DISCONTINUED | OUTPATIENT
Start: 2023-12-07 | End: 2023-12-09 | Stop reason: HOSPADM

## 2023-12-07 RX ORDER — METOCLOPRAMIDE HYDROCHLORIDE 5 MG/ML
10 INJECTION INTRAMUSCULAR; INTRAVENOUS EVERY 6 HOURS PRN
Status: CANCELLED | OUTPATIENT
Start: 2023-12-07

## 2023-12-07 RX ORDER — SODIUM CHLORIDE, SODIUM LACTATE, POTASSIUM CHLORIDE, CALCIUM CHLORIDE 600; 310; 30; 20 MG/100ML; MG/100ML; MG/100ML; MG/100ML
INJECTION, SOLUTION INTRAVENOUS
Status: COMPLETED
Start: 2023-12-07 | End: 2023-12-07

## 2023-12-07 RX ORDER — METOCLOPRAMIDE 10 MG/1
10 TABLET ORAL EVERY 6 HOURS PRN
Status: DISCONTINUED | OUTPATIENT
Start: 2023-12-07 | End: 2023-12-09 | Stop reason: HOSPADM

## 2023-12-07 RX ORDER — HYDROXYZINE PAMOATE 25 MG/1
25 CAPSULE ORAL 3 TIMES DAILY PRN
Status: CANCELLED | OUTPATIENT
Start: 2023-12-07

## 2023-12-07 RX ORDER — IBUPROFEN 800 MG/1
800 TABLET, FILM COATED ORAL
Status: DISCONTINUED | OUTPATIENT
Start: 2023-12-07 | End: 2023-12-09 | Stop reason: HOSPADM

## 2023-12-07 RX ORDER — PENICILLIN G POTASSIUM 5000000 [IU]/1
5 INJECTION, POWDER, FOR SOLUTION INTRAMUSCULAR; INTRAVENOUS ONCE
Status: DISCONTINUED | OUTPATIENT
Start: 2023-12-07 | End: 2023-12-08

## 2023-12-07 RX ORDER — ONDANSETRON 2 MG/ML
4 INJECTION INTRAMUSCULAR; INTRAVENOUS EVERY 6 HOURS PRN
Status: CANCELLED | OUTPATIENT
Start: 2023-12-07

## 2023-12-07 RX ORDER — NIFEDIPINE 10 MG/1
20 CAPSULE ORAL EVERY 6 HOURS
Status: DISCONTINUED | OUTPATIENT
Start: 2023-12-07 | End: 2023-12-08

## 2023-12-07 RX ORDER — CARBOPROST TROMETHAMINE 250 UG/ML
250 INJECTION, SOLUTION INTRAMUSCULAR
Status: DISCONTINUED | OUTPATIENT
Start: 2023-12-07 | End: 2023-12-09 | Stop reason: HOSPADM

## 2023-12-07 RX ORDER — DEXTROSE MONOHYDRATE 25 G/50ML
25-50 INJECTION, SOLUTION INTRAVENOUS
Status: DISCONTINUED | OUTPATIENT
Start: 2023-12-07 | End: 2023-12-09 | Stop reason: HOSPADM

## 2023-12-07 RX ORDER — TRANEXAMIC ACID 10 MG/ML
1 INJECTION, SOLUTION INTRAVENOUS EVERY 30 MIN PRN
Status: DISCONTINUED | OUTPATIENT
Start: 2023-12-07 | End: 2023-12-09 | Stop reason: HOSPADM

## 2023-12-07 RX ORDER — PROCHLORPERAZINE MALEATE 10 MG
10 TABLET ORAL EVERY 6 HOURS PRN
Status: DISCONTINUED | OUTPATIENT
Start: 2023-12-07 | End: 2023-12-09 | Stop reason: HOSPADM

## 2023-12-07 RX ORDER — NIFEDIPINE 10 MG/1
20 CAPSULE ORAL ONCE
Status: COMPLETED | OUTPATIENT
Start: 2023-12-07 | End: 2023-12-07

## 2023-12-07 RX ORDER — OXYTOCIN 10 [USP'U]/ML
10 INJECTION, SOLUTION INTRAMUSCULAR; INTRAVENOUS
Status: DISCONTINUED | OUTPATIENT
Start: 2023-12-07 | End: 2023-12-09 | Stop reason: HOSPADM

## 2023-12-07 RX ORDER — OXYTOCIN/0.9 % SODIUM CHLORIDE 30/500 ML
100-340 PLASTIC BAG, INJECTION (ML) INTRAVENOUS CONTINUOUS PRN
Status: DISCONTINUED | OUTPATIENT
Start: 2023-12-07 | End: 2023-12-09 | Stop reason: HOSPADM

## 2023-12-07 RX ORDER — PROCHLORPERAZINE 25 MG
25 SUPPOSITORY, RECTAL RECTAL EVERY 12 HOURS PRN
Status: DISCONTINUED | OUTPATIENT
Start: 2023-12-07 | End: 2023-12-09 | Stop reason: HOSPADM

## 2023-12-07 RX ORDER — METOCLOPRAMIDE HYDROCHLORIDE 5 MG/ML
10 INJECTION INTRAMUSCULAR; INTRAVENOUS EVERY 6 HOURS PRN
Status: DISCONTINUED | OUTPATIENT
Start: 2023-12-07 | End: 2023-12-09 | Stop reason: HOSPADM

## 2023-12-07 RX ORDER — ESCITALOPRAM OXALATE 10 MG/1
10 TABLET ORAL DAILY
Status: DISCONTINUED | OUTPATIENT
Start: 2023-12-08 | End: 2023-12-09 | Stop reason: HOSPADM

## 2023-12-07 RX ORDER — METOCLOPRAMIDE 10 MG/1
10 TABLET ORAL EVERY 6 HOURS PRN
Status: CANCELLED | OUTPATIENT
Start: 2023-12-07

## 2023-12-07 RX ORDER — ONDANSETRON 2 MG/ML
4 INJECTION INTRAMUSCULAR; INTRAVENOUS EVERY 6 HOURS PRN
Status: DISCONTINUED | OUTPATIENT
Start: 2023-12-07 | End: 2023-12-09 | Stop reason: HOSPADM

## 2023-12-07 RX ORDER — ESCITALOPRAM OXALATE 10 MG/1
10 TABLET ORAL DAILY
Status: CANCELLED | OUTPATIENT
Start: 2023-12-07

## 2023-12-07 RX ORDER — ONDANSETRON 4 MG/1
4 TABLET, ORALLY DISINTEGRATING ORAL EVERY 6 HOURS PRN
Status: DISCONTINUED | OUTPATIENT
Start: 2023-12-07 | End: 2023-12-09 | Stop reason: HOSPADM

## 2023-12-07 RX ORDER — MISOPROSTOL 200 UG/1
800 TABLET ORAL
Status: DISCONTINUED | OUTPATIENT
Start: 2023-12-07 | End: 2023-12-09 | Stop reason: HOSPADM

## 2023-12-07 RX ORDER — PROCHLORPERAZINE MALEATE 10 MG
10 TABLET ORAL EVERY 6 HOURS PRN
Status: CANCELLED | OUTPATIENT
Start: 2023-12-07

## 2023-12-07 RX ORDER — OXYTOCIN/0.9 % SODIUM CHLORIDE 30/500 ML
340 PLASTIC BAG, INJECTION (ML) INTRAVENOUS CONTINUOUS PRN
Status: DISCONTINUED | OUTPATIENT
Start: 2023-12-07 | End: 2023-12-09 | Stop reason: HOSPADM

## 2023-12-07 RX ORDER — FENTANYL CITRATE 50 UG/ML
100 INJECTION, SOLUTION INTRAMUSCULAR; INTRAVENOUS
Status: DISCONTINUED | OUTPATIENT
Start: 2023-12-07 | End: 2023-12-09 | Stop reason: HOSPADM

## 2023-12-07 RX ADMIN — DEXTROSE MONOHYDRATE: 100 INJECTION, SOLUTION INTRAVENOUS at 23:22

## 2023-12-07 RX ADMIN — NIFEDIPINE 20 MG: 10 CAPSULE ORAL at 23:17

## 2023-12-07 RX ADMIN — SODIUM CHLORIDE, POTASSIUM CHLORIDE, SODIUM LACTATE AND CALCIUM CHLORIDE 1000 ML: 600; 310; 30; 20 INJECTION, SOLUTION INTRAVENOUS at 21:48

## 2023-12-07 RX ADMIN — BETAMETHASONE ACETATE AND BETAMETHASONE SODIUM PHOSPHATE 12 MG: 3; 3 INJECTION, SUSPENSION INTRA-ARTICULAR; INTRALESIONAL; INTRAMUSCULAR; SOFT TISSUE at 21:09

## 2023-12-07 RX ADMIN — SODIUM CHLORIDE, POTASSIUM CHLORIDE, SODIUM LACTATE AND CALCIUM CHLORIDE 1000 ML: 600; 310; 30; 20 INJECTION, SOLUTION INTRAVENOUS at 19:03

## 2023-12-07 ASSESSMENT — ACTIVITIES OF DAILY LIVING (ADL)
ADLS_ACUITY_SCORE: 35
ADLS_ACUITY_SCORE: 18
ADLS_ACUITY_SCORE: 18

## 2023-12-08 PROBLEM — Z37.9 NORMAL LABOR: Status: RESOLVED | Noted: 2023-12-07 | Resolved: 2023-12-08

## 2023-12-08 LAB
ABO/RH(D): NORMAL
ALBUMIN SERPL BCG-MCNC: 3.4 G/DL (ref 3.5–5.2)
ALP SERPL-CCNC: 218 U/L (ref 40–150)
ALT SERPL W P-5'-P-CCNC: 11 U/L (ref 0–50)
AMMONIA PLAS-SCNC: 26 UMOL/L (ref 11–51)
ANION GAP SERPL CALCULATED.3IONS-SCNC: 6 MMOL/L (ref 7–15)
ANTIBODY ID: NORMAL
AST SERPL W P-5'-P-CCNC: 19 U/L (ref 0–45)
BILIRUB SERPL-MCNC: 0.3 MG/DL
BUN SERPL-MCNC: 7.1 MG/DL (ref 6–20)
C TRACH DNA SPEC QL PROBE+SIG AMP: NEGATIVE
CALCIUM SERPL-MCNC: 8.9 MG/DL (ref 8.6–10)
CHLORIDE SERPL-SCNC: 106 MMOL/L (ref 98–107)
CREAT SERPL-MCNC: 0.7 MG/DL (ref 0.51–0.95)
DEPRECATED HCO3 PLAS-SCNC: 22 MMOL/L (ref 22–29)
EGFRCR SERPLBLD CKD-EPI 2021: >90 ML/MIN/1.73M2
FETAL BLEED SCREEN: NORMAL
GLUCOSE BLDC GLUCOMTR-MCNC: 133 MG/DL (ref 70–99)
GLUCOSE BLDC GLUCOMTR-MCNC: 147 MG/DL (ref 70–99)
GLUCOSE BLDC GLUCOMTR-MCNC: 176 MG/DL (ref 70–99)
GLUCOSE BLDC GLUCOMTR-MCNC: 180 MG/DL (ref 70–99)
GLUCOSE BLDC GLUCOMTR-MCNC: 198 MG/DL (ref 70–99)
GLUCOSE SERPL-MCNC: 121 MG/DL (ref 70–99)
N GONORRHOEA DNA SPEC QL NAA+PROBE: NEGATIVE
POTASSIUM SERPL-SCNC: 4.5 MMOL/L (ref 3.4–5.3)
PROT SERPL-MCNC: 6.4 G/DL (ref 6.4–8.3)
SODIUM SERPL-SCNC: 134 MMOL/L (ref 135–145)
SPECIMEN EXPIRATION DATE: NORMAL
SPECIMEN EXPIRATION DATE: NORMAL
T PALLIDUM AB SER QL: NONREACTIVE

## 2023-12-08 PROCEDURE — 36415 COLL VENOUS BLD VENIPUNCTURE: CPT

## 2023-12-08 PROCEDURE — 96372 THER/PROPH/DIAG INJ SC/IM: CPT

## 2023-12-08 PROCEDURE — 80053 COMPREHEN METABOLIC PANEL: CPT

## 2023-12-08 PROCEDURE — 82140 ASSAY OF AMMONIA: CPT | Performed by: STUDENT IN AN ORGANIZED HEALTH CARE EDUCATION/TRAINING PROGRAM

## 2023-12-08 PROCEDURE — 59025 FETAL NON-STRESS TEST: CPT | Mod: 26 | Performed by: OBSTETRICS & GYNECOLOGY

## 2023-12-08 PROCEDURE — 36415 COLL VENOUS BLD VENIPUNCTURE: CPT | Performed by: STUDENT IN AN ORGANIZED HEALTH CARE EDUCATION/TRAINING PROGRAM

## 2023-12-08 PROCEDURE — 99232 SBSQ HOSP IP/OBS MODERATE 35: CPT | Mod: 25 | Performed by: OBSTETRICS & GYNECOLOGY

## 2023-12-08 PROCEDURE — 120N000002 HC R&B MED SURG/OB UMMC

## 2023-12-08 PROCEDURE — 258N000003 HC RX IP 258 OP 636

## 2023-12-08 PROCEDURE — G0378 HOSPITAL OBSERVATION PER HR: HCPCS

## 2023-12-08 PROCEDURE — 250N000009 HC RX 250

## 2023-12-08 PROCEDURE — 250N000013 HC RX MED GY IP 250 OP 250 PS 637

## 2023-12-08 PROCEDURE — 250N000011 HC RX IP 250 OP 636

## 2023-12-08 PROCEDURE — 82962 GLUCOSE BLOOD TEST: CPT

## 2023-12-08 RX ORDER — NICOTINE POLACRILEX 4 MG
15-30 LOZENGE BUCCAL
Status: DISCONTINUED | OUTPATIENT
Start: 2023-12-08 | End: 2023-12-08

## 2023-12-08 RX ORDER — DEXTROSE MONOHYDRATE 25 G/50ML
25-50 INJECTION, SOLUTION INTRAVENOUS
Status: DISCONTINUED | OUTPATIENT
Start: 2023-12-08 | End: 2023-12-08

## 2023-12-08 RX ORDER — SODIUM CHLORIDE 9 MG/ML
INJECTION, SOLUTION INTRAVENOUS CONTINUOUS
Status: DISCONTINUED | OUTPATIENT
Start: 2023-12-08 | End: 2023-12-08

## 2023-12-08 RX ORDER — HYDROXYZINE PAMOATE 25 MG/1
25 CAPSULE ORAL 3 TIMES DAILY PRN
Qty: 90 CAPSULE | Refills: 0 | Status: SHIPPED | OUTPATIENT
Start: 2023-12-08 | End: 2024-03-15

## 2023-12-08 RX ORDER — HYDROXYZINE HYDROCHLORIDE 25 MG/1
25 TABLET, FILM COATED ORAL EVERY 6 HOURS PRN
Status: DISCONTINUED | OUTPATIENT
Start: 2023-12-08 | End: 2023-12-09 | Stop reason: HOSPADM

## 2023-12-08 RX ORDER — HYDROXYZINE HYDROCHLORIDE 50 MG/1
50 TABLET, FILM COATED ORAL EVERY 6 HOURS PRN
Status: DISCONTINUED | OUTPATIENT
Start: 2023-12-08 | End: 2023-12-09 | Stop reason: HOSPADM

## 2023-12-08 RX ORDER — DIPHENHYDRAMINE HCL 25 MG
25 CAPSULE ORAL EVERY 6 HOURS PRN
Status: DISCONTINUED | OUTPATIENT
Start: 2023-12-08 | End: 2023-12-09 | Stop reason: HOSPADM

## 2023-12-08 RX ORDER — BETAMETHASONE SODIUM PHOSPHATE AND BETAMETHASONE ACETATE 3; 3 MG/ML; MG/ML
12 INJECTION, SUSPENSION INTRA-ARTICULAR; INTRALESIONAL; INTRAMUSCULAR; SOFT TISSUE ONCE
Status: COMPLETED | OUTPATIENT
Start: 2023-12-08 | End: 2023-12-08

## 2023-12-08 RX ADMIN — DIPHENHYDRAMINE HYDROCHLORIDE 25 MG: 25 CAPSULE ORAL at 11:48

## 2023-12-08 RX ADMIN — BETAMETHASONE ACETATE AND BETAMETHASONE SODIUM PHOSPHATE 12 MG: 3; 3 INJECTION, SUSPENSION INTRA-ARTICULAR; INTRALESIONAL; INTRAMUSCULAR; SOFT TISSUE at 21:34

## 2023-12-08 RX ADMIN — DIPHENHYDRAMINE HYDROCHLORIDE 25 MG: 25 CAPSULE ORAL at 03:25

## 2023-12-08 RX ADMIN — SODIUM CHLORIDE: 9 INJECTION, SOLUTION INTRAVENOUS at 02:35

## 2023-12-08 RX ADMIN — INSULIN HUMAN 1.5 UNITS/HR: 1 INJECTION, SOLUTION INTRAVENOUS at 06:20

## 2023-12-08 RX ADMIN — ONDANSETRON 4 MG: 4 TABLET, ORALLY DISINTEGRATING ORAL at 20:44

## 2023-12-08 RX ADMIN — ONDANSETRON 4 MG: 4 TABLET, ORALLY DISINTEGRATING ORAL at 11:47

## 2023-12-08 RX ADMIN — INSULIN HUMAN 1.5 UNITS/HR: 1 INJECTION, SOLUTION INTRAVENOUS at 02:37

## 2023-12-08 RX ADMIN — HYDROXYZINE HYDROCHLORIDE 25 MG: 25 TABLET, FILM COATED ORAL at 22:15

## 2023-12-08 RX ADMIN — ESCITALOPRAM OXALATE 10 MG: 10 TABLET ORAL at 11:50

## 2023-12-08 RX ADMIN — NIFEDIPINE 20 MG: 10 CAPSULE ORAL at 05:06

## 2023-12-08 ASSESSMENT — ACTIVITIES OF DAILY LIVING (ADL)
ADLS_ACUITY_SCORE: 18

## 2023-12-08 NOTE — PROGRESS NOTES
M Antepartum Progress Note    Subjective:   Myrtle is doing well this morning. She had a migraine last night that improved with medication. She feels light contractions every 20 minutes that resolve sporadically. She had some bleeding last night that started as clumps and has decreased to slightly more than spotting all night. She has not checked her bleeding this morning as she has been sleeping. She has had increased bleeding after cervical checks before. Denies LOF. Endorses fetal movement. She would like to TOLAC here when she goes into labor.     Objective:  Vitals:    23 0351 23 0414 23 0506 23 0615   BP:  96/54 103/54 99/55   BP Location:  Right arm     Patient Position:  Semi-Estrada's     Cuff Size:  Adult Regular     Pulse:       Resp:       Temp:       TempSrc:       SpO2: 98%   99%       No intake/output data recorded.    Gen: Resting comfortably in bed, NAD  CV: RRR, no murmurs  Abd: Gravid, non-tender, non-distended  Ext: non-tender, no edema    SSE/SVE: per this morning - unchanged, 1.5/60/-3     FHT: , moderate variability, present accels, none decels  [place]: negative    Assessment/Plan:   28 year old  at 32w5d by 6w4d, here for  labor. Pregnancy notable for history of  delivery x 2 ( and CS for breech/PPROM) and OTC deficiency.     #  Labor:   # History of pre-term delivery x2  # Hx CS  - Cervix: 50/-3 > 1.5/60/-3 unchanged  - Discontinue Nifed 20  - BMZ #1 (), will receive BMZ #2 tonight  - Membranes: ROMPlus negative, fern neg  - UA reflex UC, Wet Prep, GBS neg  - GC/CT pending  - Desires TOLAC, s/p consent in clinic     # Ornithine transcarbamylase deficiency   - s/p MFM consult  - [facility] recs for intrapartum management:               - Can discontinue continuous IV D10, insulin drip, and PCN  - QID BG checks  - Contact genetics and metabolism if no enteral intake estimated for >12 hours  - Ammonia levels wnl  - Contact Genetics and  Metabolism on call provider for management of the  after delivery. (See letter 22 for detailed plan of care)        # Fetal Well-Being  - Continuous Monitoring   - FHT: Category 1     # PNC  - Rh neg, GBS unknown  - GCT: wnl  - TOLAC consent signed   - Desires PPTL, FTP     #PNC:     - BMI 25  - Rh neg s/p Rhogam 11/3/2023, Rubella immune, GCT wnl  - Hep B Antigen neg  - GBS neg  - Other infectious labs neg  - Placenta: anterior and fundal    Patient seen and care plan discussed under supervision of Dr. Gilbert.    Makeda Al, MS3  2023 8:31 AM    Physician Attestation   I, Ignacio Gilbert MD, saw this patient with the resident/fellow and agree with the resident s findings and plan of care as documented in the resident s note.      I personally reviewed vital signs, medications, and labs.    Key findings: admitted for  contractions without cervical change and cervix less than 3 cm. Patient at risk for  birth secondary to history of 2 prior PTB at 34 weeks. Last delivery by CD but would like to attempt TOLAC. Incidentally has OTC deficiency that can lead to increased partial protein breakdown with increased ammonia and neurological symptoms. For prevention requires fluids, dextrose (with or without insulin) in all high physical stress conditions as well as avoidance of prolonged fasting without dextrose infusion.    Ignacio Gilbert MD  Date of Service (when I saw the patient): 23    Time Spent on this Encounter   I personally spent a total of 25 minutes, including both face-to-face and non-face-to-face on the date of the encounter, addressing the above diagnosis. Activities performed in this time include chart review, obtaining / reviewing history, performing a medically necessary evaluation, documentation and Charge activities: counseling, care coordination, and reviewing results, equivalent to medical decision making that is of low complexity.

## 2023-12-08 NOTE — PROVIDER NOTIFICATION
12/08/23 1422   Provider Notification   Provider Name/Title Dr Libra Little   Method of Notification In Department   Request Evaluate - Remote   Notification Reason Status Update     Patient continues to feel comfortable, states she is feeling contractions every 30 mins. Dr Libra Little okayed for patient to come off continuous monitoring for now unless patient starts complaining of increasing labor symptoms.

## 2023-12-08 NOTE — PLAN OF CARE
"Got report from SHAMA Alvarado. Patient VVS, afebrile. Fetal and uterine activity monitored, see flow sheet.  Vaginal swabs were completed along with the betamethasone injection. Patient had IV fluid bolus running, appeared to be leaking. RN flyer was called to assess. RN flyer ended up starting a new IV on the right arm that was used to continue running IV fluids due to uterine activity. At 2213 patient was uncomfortable and stated the pain from contractions has intensified. Dr. Cleary was called to the room and assessed patient cervix. Dr. Cleary reported patient is changing cervix and should be admitted. At 2240, patient was admitted to labor and transferred to room 460. After orienting and settling patient in room, nifedipine and Dextrose 10 continuous were administered. At 2334, patient report when she got up to use the bathroom she felt something coming out then a grape size blood clot was noticed in the toilet. This RN was not able to assess because patient accidentally flushed it. Dr. Cleary was notified, plan was to assess for more bleeding. Patient has been stable with bleeding since. Patient blood glucose was checked at 0048 per orders and it was 198. Per CNM and Dr. Cleary, Insulin infusion was ordered. Patient was started on insulin and hourly blood glucose checks with an algorithm. Patient stated she was anxious throughout the shift due to concerns of delivering early and getting started on insulin and what effects that could have with her seizure history. Utilized PRN Benadryl for headache.Patient did not fall asleep until around 0400. At 0635, Dr. Cleary assessed patient cervix and it was unchanged, still at 1.5cm. Dr. Cleary reported to RN, \"patient is stable so all fluids can be turned off\" (dextrose, insulin, normal saline). Patient stated she agrees with the plan. Patient is currently resting, waiting to eat breakfast. Report given to Ilda SESAY.  "

## 2023-12-08 NOTE — H&P
L&D History and Physical   2023  Marina Strickland  [id number]      HPI:   Marina Strickland is a 28 year old  at 32w6d by 6w4d US here for  contractions.  Pregnancy complicated as below.    She states that she started feeling painful contractions earlier today as well as some leaking of fluid. Denies vaginal bleeding or decreased fetal movement. Denies fever, chills, HA, scotoma, CP, SOB, nausea, vomiting,  RUQ pain, constipation, diarrhea, dysuria, and acute swelling.    Her pregnancy has been complicated by:  - History of  delivery at 34 weeks  - History of CS at 34 weeks for PPROM and breech presentation  - Hx MACK, in remission  - OTC deficiency  - Anxiety, depression, ADHD, bipolar disorder, and autism spectrum disorder  - Non epileptic seizure disorder  - Hx eating disorder    OBHX:   OB History    Para Term  AB Living   7 2 0 2 4 2   SAB IAB Ectopic Multiple Live Births   4 0 0 0 2      # Outcome Date GA Lbr Karl/2nd Weight Sex Delivery Anes PTL Lv   7 Current            6  22 34w0d  2.18 kg (4 lb 12.9 oz) M CS-LTranv Spinal Y CLARISSA      Name: LYNSEY,MALE-MARINA      Apgar1: 8  Apgar5: 8   5 SAB 2021           4  19 34w5d  2.34 kg (5 lb 2.5 oz) F Vag-Spont   CLARISSA      Name: Papo      Apgar1: 8  Apgar5: 9   3 SAB 13 6w0d          2 SAB  4w0d          1 SAB               Obstetric Comments    labor (SROM at 34+4 in first pregnancy and 34+0 in second),  birth, 3rd or 4th degree perineal laceration,  mood disorder (both PPD and PPA), and  (SROM at 34+0 and breech position)           Past Medical History:   Diagnosis Date    ADHD (attention deficit hyperactivity disorder)     Anxiety     Anxiety disorder 10/15/2008    Asperger's disorder 2009    Attention deficit hyperactivity disorder (ADHD) 10/15/2008    Formatting of this note might be different from the original.  LW Onset:      Benign joint  "hypermobility 2010    Bipolar disorder (H)      delivery delivered 2022    Chronic back pain 10/16/2023    Conversion disorder with seizures or convulsions 2019    Depressive disorder     Developmental expressive writing disorder 2009    Headache 2023    History of anemia 10/16/2023    History of asthma 2013    History of eating disorder     History of premature delivery 10/16/2023    07/01/19: PTD@34.4wks, PPROM, @ [facility],,  Q trimester both previous pregnancies at 34 wks, M referral ordered    History of substance use     opiate    History of substance use disorder 10/16/2023    In remission. Hx of relapse after last delivery. Avoid narcotics in labor and delivery.     Hyperemesis gravidarum 10/17/2023    Major depressive disorder, recurrent episode, moderate (H) 2023    Migraine 2023    Mixed anxiety and depressive disorder 10/05/2022    started on sertraline 50mg at 8wks started on sertraline 50mg at 8wks    Orthostatic hypotension 2011    OTC (ornithine transcarbamylase deficiency) (H24)     family hx     delivery 2019    Psychogenic nonepileptic seizure     Rh negative state in antepartum period 10/16/2023    Needs rhogam at 28 weeks    Rubella non-immune status, antepartum     Seizures (H) 2019    no meds, last seizure 2022, had neuro referral with last pregnancy and was told it was \"anxiety induced\"- she wants to treat with sertraline only. no meds, last seizure 2022, referral sent to neuro, possible seizure 22, has neuro appt 22--feels anxiety induced and wants to treat with sertraline only. PER GUIDELINES - does not qualify for CNM care    Severe major depression without psychotic features (H) 2010    Syncope and collapse 2008    Formatting of this note might be different from the original. LW Modifier:  3/10/08 ; Syncope Formatting of this note might be different from the original. Formatting of " "this note might be different from the original. LW Modifier:  3/10/08 ; Syncope    Tobacco use disorder 2011    Formatting of this note might be different from the original. Tobacco Abuse Formatting of this note might be different from the original. Formatting of this note might be different from the original. Tobacco Abuse    Uncomplicated asthma     no inhaler use since high school    Vaginal bleeding before 22 weeks gestation 2023       Past Surgical History:   Procedure Laterality Date    AS REMOVAL OF TONSILS,<13 Y/O       SECTION N/A 2022    Procedure:  SECTION;  Surgeon: Gina Pascual MD;  Location: [facility] L+D    MYRINGOTOMY, INSERT TUBE BILATERAL, COMBINED      MYRINGOTOMY, INSERT TUBE BILATERAL, COMBINED         Medications:   No current facility-administered medications on file prior to encounter.  escitalopram (LEXAPRO) 10 MG tablet, Take 1 tablet (10 mg) by mouth daily for 60 days  hydrOXYzine (VISTARIL) 25 MG capsule, Take 1 capsule (25 mg) by mouth 3 times daily as needed for itching        Allergies   Allergen Reactions    Adhesive Tape Other (See Comments), Hives and Nausea     Burns only with waterproof bandages. [facility] Tegaderm and tape is okay.    Bangura only with waterproof bandages. [facility] Tegaderm and tape is okay.   \"burns\"    Dextromethorphan-Guaifenesin Hives    Guaifenesin Hives and Nausea and Vomiting    Latex Hives, Itching and Rash    Morphine And Related Hives    Mushroom Anaphylaxis    Mushroom Extract Complex Anaphylaxis and Unknown    Other (Do Not Use) Hives and Other (See Comments)     Burns only with waterproof bandages. [facility] Tegaderm and tape is okay.   \"bangura\"       Family History   Problem Relation Age of Onset    Breast Cancer Mother     Anxiety Disorder Mother     Depression Mother     Hypertension Mother     Other - See Comments Mother         severe hyperemesis    Diabetes Mother     Ovarian Cancer Mother     " "Cancer Mother         pelvic, anal, stomach, colon, skin x6    Post-Traumatic Stress Disorder (PTSD) Mother     Cerebrovascular Disease Mother     Coronary Artery Disease Mother     Asthma Mother     Chronic Obstructive Pulmonary Disease Mother     No Known Problems Father     No Known Problems Sister     Emphysema Maternal Grandfather     No Known Problems Paternal Grandmother     No Known Problems Paternal Grandfather        SocialHX:   Social History     Tobacco Use    Smoking status: Former     Types: Cigarettes     Quit date: 3/1/2023     Years since quittin.7     Passive exposure: Yes    Smokeless tobacco: Former   Vaping Use    Vaping Use: Never used   Substance Use Topics    Alcohol use: Not Currently    Drug use: Not Currently       ROS: 10-point ROS negative except as indicated in HPI.    Physical Exam:  Vitals:    23 1828 23 2300 23 2317 23 2346   BP: 117/67 123/66 123/66    BP Location: Right arm Right arm     Patient Position: Semi-Estrada's Semi-Estrada's     Cuff Size: Adult Regular Adult Regular     Pulse: 78      Resp: 18 19     Temp: 98.2  F (36.8  C) 99.3  F (37.4  C)     TempSrc: Oral Oral     SpO2:    100%     General: alert, oriented female, resting in bed in NAD  CV: regular rate and rhythm  Lungs: clear bilaterally, no crackles or wheezes.  Abdomen: soft, gravid, non-tender, EFW 5#.  Extremities: bilateral lower extremities non-tender with no edema    SVE: 1/50/-3 > 1.5/60/-3  Membranes: Intact    Presentation: Cephalic by BSUS    FHT  Baseline: 130  Variability: Moderate  Accels: Present  Decels: Absent  [place]: 5-6 in 10 minutes    Prenatal ultrasounds:  Anatomy wnl    Prenatal Labs:   Lab Results   Component Value Date    AS Negative 2023    HEPBANG Nonreactive 2023    HGB 10.4 (L) 2023       GBS Status:   No results found for: \"GBS\"    No results found for: \"PAP\"    Labs:   Results for orders placed or performed during the hospital encounter of " 12/07/23 (from the past 24 hour(s))   Rupture of Fetal Membranes by ROM Plus   Result Value Ref Range    Rupture of Fetal Membranes by ROM Plus Negative Negative, Invalid, Suggest Repeat    Narrative    It is recommended that the tests to detect rupture of the amniotic membranes should not be used without other clinical assessments to make clinical patient management decision.   Wet preparation    Specimen: Vagina; Swab   Result Value Ref Range    Trichomonas Absent Absent    Yeast Absent Absent    Clue Cells Absent Absent    WBCs/high power field None None   Fern Test for Rupture of Membranes   Result Value Ref Range    Fern Crystallization No ferning present No ferning present   UA with Microscopic reflex to Culture    Specimen: Urine, Clean Catch   Result Value Ref Range    Color Urine Light Yellow Colorless, Straw, Light Yellow, Yellow    Appearance Urine Clear Clear    Glucose Urine Negative Negative mg/dL    Bilirubin Urine Negative Negative    Ketones Urine Negative Negative mg/dL    Specific Gravity Urine 1.016 1.003 - 1.035    Blood Urine Negative Negative    pH Urine 6.5 5.0 - 7.0    Protein Albumin Urine Negative Negative mg/dL    Urobilinogen Urine Normal Normal, 2.0 mg/dL    Nitrite Urine Negative Negative    Leukocyte Esterase Urine Trace (A) Negative    Mucus Urine Present (A) None Seen /LPF    RBC Urine <1 <=2 /HPF    WBC Urine 1 <=5 /HPF    Squamous Epithelials Urine 1 <=1 /HPF    Narrative    Urine Culture not indicated   CBC with platelets   Result Value Ref Range    WBC Count 9.7 4.0 - 11.0 10e3/uL    RBC Count 3.79 (L) 3.80 - 5.20 10e6/uL    Hemoglobin 10.4 (L) 11.7 - 15.7 g/dL    Hematocrit 32.2 (L) 35.0 - 47.0 %    MCV 85 78 - 100 fL    MCH 27.4 26.5 - 33.0 pg    MCHC 32.3 31.5 - 36.5 g/dL    RDW 12.5 10.0 - 15.0 %    Platelet Count 249 150 - 450 10e3/uL   ABO/Rh type and screen    Narrative    The following orders were created for panel order ABO/Rh type and screen.  Procedure                                Abnormality         Status                     ---------                               -----------         ------                     Adult Type and Screen[[id number]]                            Preliminary result           Please view results for these tests on the individual orders.   Ammonia   Result Value Ref Range    Ammonia 14 11 - 51 umol/L   Adult Type and Screen   Result Value Ref Range    ABO/RH(D) B NEG     SPECIMEN EXPIRATION DATE [id number]        A/P:   28 year old  at 32w5d by 6w4d, here for  labor. Pregnancy notable for history of  delivery x 2 ( and CS for breech/PPROM).    #  Labor:   # History of pre-term delivery x2  # Hx CS  - Cervix: 50/-3 > 1.5/60/-3, admit for  labor  - Nifed 20 q6hr for 48 hours through beta window  - BMZ #1 (3383)  - Membranes: ROMPlus negative  - Labs: UA reflex UC, GC/C, Wet Prep, GBS  - Desires TOLAC, s/p consent in clinic.     # Ornithine transcarbamylase deficiency   - s/p MFM consult  - MFM recs for intrapartum management:   - Continuous IV D10 intrapartum and postpartum  - QID BG checks, mSSI; Insulin gtt prn  - Contact genetics and metabolism if no enteral intake estimated for >12 hours  - Q6 hour ammonia levels  - Contact Genetics and Metabolism on call provider for management of the  after delivery. (See letter 22 for detailed plan of care)        # Fetal Well-Being  - Continuous Monitoring   - FHT: Category 1     # PNC  - Rh neg, GBS unknown  - GCT: wnl  - TOLAC consent signed   - Desires PPTL, FTP    #PNC:     - BMI 25  - Rh neg, Rubella immune, GCT wnl  - Hep B Antigen neg  - GBS neg  - Other infectious labs neg  - Placenta: anterior and fundal    Patient seen and care plan discussed under supervision of Dr. Adler.    Eloina Cleary MD  Ob/Gyn Resident, PGY-3  2023 12:06 AM    I have seen and examined the patient. I have reviewed and agree with the note.   Gabi Adler MD

## 2023-12-08 NOTE — DISCHARGE SUMMARY
[facility] Discharge Summary    Myrtle Strickland MRN# [id number]   Age: 28 year old YOB: 1995     Date of Admission:  2023  Date of Discharge:  2023    Admitting Physician:  Gabi Adler MD  Discharge Physician:  Ignacio Gilbert MD     Admission Diagnosis:  - IUP at 32w5d  -  labor  - History of  delivery at 34 weeks  - History of CS at 34 weeks for PPROM and breech presentation  - Hx MACK, in remission  - Ornithine Transcarbamylase deficiency  - Anxiety, depression, ADHD, bipolar disorder, and autism spectrum disorder  - Non epileptic seizure disorder  - Hx eating disorder    Discharge Diagnosis:  - IUP at 33w0d   - same as above    Procedures:   - None    Consultations:    - Anesthesiology  - NICU    Medications prior to admission:  Medications Prior to Admission   Medication Sig Dispense Refill Last Dose    escitalopram (LEXAPRO) 10 MG tablet Take 1 tablet (10 mg) by mouth daily for 60 days 30 tablet 1 2023    hydrOXYzine (VISTARIL) 25 MG capsule Take 1 capsule (25 mg) by mouth 3 times daily as needed for itching 90 capsule 0      Brief History of Presentation:    Myrtle Strickland is a 28 year old  at 32w6d by 6w4d US who presented on 2023 for  contractions. She stated that she started feeling painful contractions earlier in the day as well as some leaking of fluid. Denied vaginal bleeding or decreased fetal movement. Denied fever, chills, HA, scotoma, CP, SOB, nausea, vomiting, RUQ pain, constipation, diarrhea, dysuria, and acute swelling.     Her pregnancy has been complicated by:  - History of  delivery at 34 weeks  - History of CS at 34 weeks for PPROM and breech presentation  - Hx MACK, in remission  - Ornithine Transcarbamylase deficiency (OTC  - Anxiety, depression, ADHD, bipolar disorder, and autism spectrum disorder  - Non epileptic seizure disorder  - Hx eating disorder    Hospital Course:    Upon admission,  patient was started on Nifedipine tocolysis. ROMPlus and fern test were negative. Cervical exam was barely changed from 1/50/-3 to 1.5/60/-3. UA reflex UC, GC/CT, and wet prep were negative. She received her first dose of betamethasone on  at 2100. She was started on continuous IV D10 with QID BG checks, mSSI, and insulin gtt prn. Ammonia labs were collected and wnl. On , Nifedipine tocolysis, continuous IV D10, insulin drip, and PCN were discontinued. FHTs throughout hospitalization were reactive and reassuring. She received her second dose of betamethasone on  at 2100. Her cervix remained unchanged, and she was determined to not be in  labor. She was ultimately discharged on 2023.      Discharge Medications:  Current Discharge Medication List        CONTINUE these medications which have CHANGED    Details   hydrOXYzine vikki (VISTARIL) 25 MG capsule Take 1 capsule (25 mg) by mouth 3 times daily as needed for itching  Qty: 90 capsule, Refills: 0    Associated Diagnoses: High-risk pregnancy, unspecified trimester; Anxiety disorder, unspecified type           CONTINUE these medications which have NOT CHANGED    Details   escitalopram (LEXAPRO) 10 MG tablet Take 1 tablet (10 mg) by mouth daily for 60 days  Qty: 30 tablet, Refills: 1    Associated Diagnoses: Major depressive disorder, recurrent episode, moderate (H)               Discharge Instructions:  Call or present to labor and delivery if you experience:   -Regular painful contractions concerning for labor   -Leakage of fluid concerning for ruptured membranes   -Decreased fetal movement   -Bright red vaginal bleeding    -Headache, vision changes, upper abdominal pain, significant increase in swelling, generalized unwell feeling    Follow up:  Follow up in MFM clinic on 2023.  Follow up for prenatal care on 2023.  Contact Genetics and Metabolism on call provider for management of the  after delivery. (See letter 22 for  detailed plan of care)     Regina Arenas MD  OB/GYN Resident, PGY-3

## 2023-12-08 NOTE — PROGRESS NOTES
L&D Triage Progress Note    HPI: Marina Strickland is a 28 year old  at 32w5d by 6w4d US, here for contractions    Pregnancy notable for:  - History of  delivery at 34 weeks  - History of CS at 34 weeks for PPROM and breech presentation  - Hx MACK, in remission  - OTC deficiency    She states that she started feeling painful contractions earlier today as well as some leaking of fluid. Denies vaginal bleeding or decreased fetal movement. Denies fever, chills, HA, scotoma, CP, SOB, nausea, vomiting,  RUQ pain, constipation, diarrhea, dysuria, and acute swelling.    Lab Results   Component Value Date    AS Negative 2023    HEPBANG Nonreactive 2023    RPR Nonreactive 2023    HGB 12.1 2023       GBS Status: Unknown      OBHX:  OB History    Para Term  AB Living   7 2 0 2 4 2   SAB IAB Ectopic Multiple Live Births   4 0 0 0 2      # Outcome Date GA Lbr Karl/2nd Weight Sex Delivery Anes PTL Lv   7 Current            6  22 34w0d  2.18 kg (4 lb 12.9 oz) M CS-LTranv Spinal Y CLARISSA      Name: LYNSEY,MALE-MARINA      Apgar1: 8  Apgar5: 8   5 SAB 2021           4  19 34w5d  2.34 kg (5 lb 2.5 oz) F Vag-Spont   CLARISSA      Name: Papo      Apgar1: 8  Apgar5: 9   3 SAB 13 6w0d          2 SAB  4w0d          1 SAB               Obstetric Comments    labor (SROM at 34+4 in first pregnancy and 34+0 in second),  birth, 3rd or 4th degree perineal laceration,  mood disorder (both PPD and PPA), and  (SROM at 34+0 and breech position)           MedicalHX:  Past Medical History:   Diagnosis Date    ADHD (attention deficit hyperactivity disorder)     Anxiety     Anxiety disorder 10/15/2008    Asperger's disorder 2009    Attention deficit hyperactivity disorder (ADHD) 10/15/2008    Formatting of this note might be different from the original.  LW Onset:  [id number]    Benign joint hypermobility 2010    Bipolar disorder (H)      " delivery delivered 2022    Chronic back pain 10/16/2023    Conversion disorder with seizures or convulsions 2019    Depressive disorder     Developmental expressive writing disorder 2009    Headache 2023    History of anemia 10/16/2023    History of asthma 2013    History of eating disorder     History of premature delivery 10/16/2023    07/01/19: PTD@34.4wks, PPROM, @ [facility],,  Q trimester both previous pregnancies at 34 wks, MFM referral ordered    History of substance use     opiate    History of substance use disorder 10/16/2023    In remission. Hx of relapse after last delivery. Avoid narcotics in labor and delivery.     Hyperemesis gravidarum 10/17/2023    Major depressive disorder, recurrent episode, moderate (H) 2023    Migraine 2023    Mixed anxiety and depressive disorder 10/05/2022    started on sertraline 50mg at 8wks started on sertraline 50mg at 8wks    Orthostatic hypotension 2011    OTC (ornithine transcarbamylase deficiency) (H24)     family hx     delivery 2019    Psychogenic nonepileptic seizure     Rh negative state in antepartum period 10/16/2023    Needs rhogam at 28 weeks    Rubella non-immune status, antepartum     Seizures (H) 2019    no meds, last seizure 2022, had neuro referral with last pregnancy and was told it was \"anxiety induced\"- she wants to treat with sertraline only. no meds, last seizure 2022, referral sent to neuro, possible seizure 22, has neuro appt 22--feels anxiety induced and wants to treat with sertraline only. PER GUIDELINES - does not qualify for CNM care    Severe major depression without psychotic features (H) 2010    Syncope and collapse 2008    Formatting of this note might be different from the original. LW Modifier:  3/10/08 ; Syncope Formatting of this note might be different from the original. Formatting of this note might be different from the original. LW " "Modifier:  3/10/08 ; Syncope    Tobacco use disorder 2011    Formatting of this note might be different from the original. Tobacco Abuse Formatting of this note might be different from the original. Formatting of this note might be different from the original. Tobacco Abuse    Uncomplicated asthma     no inhaler use since high school    Vaginal bleeding before 22 weeks gestation 2023       SurgicalHX:  Past Surgical History:   Procedure Laterality Date    AS REMOVAL OF TONSILS,<11 Y/O       SECTION N/A 2022    Procedure:  SECTION;  Surgeon: Gina Pascual MD;  Location: [facility] L[facility]    MYRINGOTOMY, INSERT TUBE BILATERAL, COMBINED      MYRINGOTOMY, INSERT TUBE BILATERAL, COMBINED         Medications:  No current facility-administered medications on file prior to encounter.  escitalopram (LEXAPRO) 10 MG tablet, Take 1 tablet (10 mg) by mouth daily for 60 days  hydrOXYzine (VISTARIL) 25 MG capsule, Take 1 capsule (25 mg) by mouth 3 times daily as needed for itching        Allergies:  Allergies   Allergen Reactions    Adhesive Tape Other (See Comments), Hives and Nausea     Burns only with waterproof bandages. [facility] hospital Tegaderm and tape is okay.    Bangura only with waterproof bandages. [facility] hospital Tegaderm and tape is okay.   \"burns\"    Dextromethorphan-Guaifenesin Hives    Guaifenesin Hives and Nausea and Vomiting    Latex Hives, Itching and Rash    Morphine And Related Hives    Mushroom Anaphylaxis    Mushroom Extract Complex Anaphylaxis and Unknown    Other (Do Not Use) Hives and Other (See Comments)     Burns only with waterproof bandages. States hospital Tegaderm and tape is okay.   \"bangura\"       FamilyHX:      Family History   Problem Relation Age of Onset    Breast Cancer Mother     Anxiety Disorder Mother     Depression Mother     Hypertension Mother     Other - See Comments Mother         severe hyperemesis    Diabetes Mother     Ovarian Cancer Mother     Cancer " Mother         pelvic, anal, stomach, colon, skin x6    Post-Traumatic Stress Disorder (PTSD) Mother     Cerebrovascular Disease Mother     Coronary Artery Disease Mother     Asthma Mother     Chronic Obstructive Pulmonary Disease Mother     No Known Problems Father     No Known Problems Sister     Emphysema Maternal Grandfather     No Known Problems Paternal Grandmother     No Known Problems Paternal Grandfather        SocialHX:  Social History     Socioeconomic History    Marital status: Single     Spouse name: None    Number of children: None    Years of education: None    Highest education level: None   Tobacco Use    Smoking status: Former     Types: Cigarettes     Quit date: 3/1/2023     Years since quittin.7     Passive exposure: Yes    Smokeless tobacco: Former   Vaping Use    Vaping Use: Never used   Substance and Sexual Activity    Alcohol use: Not Currently    Drug use: Not Currently    Sexual activity: Yes     Partners: Male       ROS: 10-point ROS negative except as in HPI.    Physical Exam:  Vitals:    23 1828   BP: 117/67   BP Location: Right arm   Patient Position: Semi-Estrada's   Cuff Size: Adult Regular   Pulse: 78   Resp: 18   Temp: 98.2  F (36.8  C)   TempSrc: Oral     GEN: resting comfortably in bed, NAD   CV: Regular rate, well perfused  PULM: On room air, no increased work of breathing  ABD: soft, gravid, non-tender, non-distended  EXT: no edema, non-tender to palpation  CVX: 50/-3  Presentation: Cephalic by BSUS  EFW: 5 lbs    NST:  FHT: baseline 130, moderate variability, + accels, mno decels  TOCO: 4-5 ctx in ten minutes    A/P: 28 year old  at 32w5d by 6w4d, here for rule out  labor. Pregnancy notable for history of  delivery x 2 ( and CS for breech/PPROM). Desires TOLAC s/p consent    # Rule out  Labor:   - Cervix: /-3  - Membranes: ROMPlus pending  - Labs: UA reflex UC, GC/C, Wet Prep, GBS  - IV Hydration    # Ornithine transcarbamylase  deficiency   - s/p MFM  - MFM recs for Intrapartum management:  Continuous infusion of IV fluids containing 10% dextrose intrapartum and postpartum. Plan for induction of labor at 39 weeks if patient desires TOLAC. If planned  section, admit the night before to begin infusion. Add insulin drip if needed to maintain euglycemia.  If no enteral intake estimated for >12 hours, or if this is anticipated to occur, please contact Genetics and Metabolism physician on call for other alternative, including IV lipids.  Ammonia levels should be monitored closely. Recommend q6h ammonia levels at the time of admission. If normal x 3, this could be spaced out as per the recommendations of the on call physician. Continue to monitor ammonia in the post delivery state.  Pre-coordination with Genetics and Metabolism is needed if surgery/anesthesia is required.  Please contact Genetics and Metabolism on call provider for management of the  after delivery. (See letter 22 for detailed plan of care)     # Fetal Well-Being  - Continuous Monitoring   - FHT: Category 1    # PNC  - Rh neg, GBS unknown  - GCT: wnl  - TOLAC consent signed     Dispo: Pending cervical exam    Patient discussed with Dr. Vitor Cleary MD  Ob/Gyn Resident, PGY-3  2023 7:37 PM

## 2023-12-08 NOTE — PLAN OF CARE
"Patient arrived on the unit via ambulance, reporting \"prodromal labor\" for 2 weeks, but things began changing around 5pm. Also reporting fluid leak. Susy Fowler CNM and Dr Adler at bedside, BSUS shows cephalic presentation. Pt has a midline IV for home use hydration. IV fluid bolus given, midline appears to be leaking slightly, patient reports this is normal for her. RN Flyer called to assess. Plan per providers is to do vaginal infection swabs, betamethasone, reassess cervix. Report to SHAMA Brown,     "

## 2023-12-08 NOTE — UTILIZATION REVIEW
"Admission Status; Secondary Review Determination     Under the authority of the Utilization Management Committee, the utilization review process indicated a secondary review on the above patient.  The review outcome is based on review of the medical records, discussions with staff, and applying clinical experience noted on the date of the review.       (x) Observation Status Appropriate - This patient does not meet hospital inpatient criteria and is placed in observation status. If this patient's primary payer is Medicare and was admitted as an inpatient, Condition Code 44 should be used and patient status changed to \"observation\".     RATIONALE FOR DETERMINATION:  28-year-old -2-4-2 at 32 weeks 6 days presents to hospital after developing painful contractions on the evening of 2023.  Patient has history of multiple  pregnancies and thus admitted to rule out  labor.  Observation care appropriate for initial monitoring and betamethasone.  If patient clinically progresses to  labor then at that time patient would be appropriate to advance to inpatient care.    The severity of illness, intensity of service provided, expected LOS and risk for adverse outcome make the care appropriate for further observation; however, doesn't meet criteria for hospital inpatient admission. This was discussed with attending physician who concurred with this determination.    The information on this document is developed by the utilization review team in order for the business office to ensure compliance.  This only denotes the appropriateness of proper admission status and does not reflect the quality of care rendered.         The definitions of Inpatient Status and Observation Status used in making the determination above are those provided in the CMS Coverage Manual, Chapter 1 and Chapter 6, section 70.4.      Sincerely,     Emiliano Garcia MD    Physician Advisor  Utilization Review/ Case Management  [facility].

## 2023-12-08 NOTE — PROGRESS NOTES
Brief Progress Note    In to room to recheck cervix. Patient was sleeping comfortably. SVE unchanged, 1.5/60/-3.    - Stop IV D10, insulin drip, Penicillin, nifedipine tocolysis.  - 2nd dose of betamethasone      Eloina Cleary MD  Ob/Gyn Resident, PGY-3  12/08/2023 8:08 AM

## 2023-12-08 NOTE — PROVIDER NOTIFICATION
12/08/23 1652   Provider Notification   Provider Name/Title Dr Libra Little   Method of Notification Electronic Page   Request Evaluate - Remote   Notification Reason Status Update;Uterine Activity     Patient reports feeling more intense contractions but states that contraction frequency is still the same every 20-30 minutes. Patient placed back on fetal monitor. Plan per Dr Little is to monitor closely and based on how patient continues to feel with contractions, will assess cervix as needed.

## 2023-12-08 NOTE — PROGRESS NOTES
Care Management Initial Consult           Additional Information:  RNCC was notified by [facility] RN Liaison that Eva was on service with [facility] for fluids and medications and requested resumption of care orders placed on Myrtle's AVS. RNCC attempted to contact Myrtle x2 to complete assessment and unable to reach. Bedside RN contacted and provided with contact information for Myrtle to call this RNCC back if she has further questions related to her home infusion services/discharge needs.    Resumption of Care for [facility] listed on AVS. RNCC to remain available for additional questions from Myrtle or the medical team.    [facility]:IV fluids and medications  Ph: [phone]  Fax: [phone]      Tonya Villanueva RN  Care Coordination   Pager: [phone]  Ascom: [phone]

## 2023-12-09 VITALS
HEART RATE: 78 BPM | DIASTOLIC BLOOD PRESSURE: 56 MMHG | TEMPERATURE: 98.3 F | RESPIRATION RATE: 16 BRPM | SYSTOLIC BLOOD PRESSURE: 123 MMHG | OXYGEN SATURATION: 99 %

## 2023-12-09 PROBLEM — F41.9 ANXIETY DISORDER, UNSPECIFIED TYPE: Status: ACTIVE | Noted: 2023-12-09

## 2023-12-09 PROCEDURE — 99223 1ST HOSP IP/OBS HIGH 75: CPT | Mod: 25 | Performed by: STUDENT IN AN ORGANIZED HEALTH CARE EDUCATION/TRAINING PROGRAM

## 2023-12-09 PROCEDURE — 59025 FETAL NON-STRESS TEST: CPT | Mod: 26 | Performed by: OBSTETRICS & GYNECOLOGY

## 2023-12-09 PROCEDURE — G0378 HOSPITAL OBSERVATION PER HR: HCPCS

## 2023-12-09 PROCEDURE — 99238 HOSP IP/OBS DSCHRG MGMT 30/<: CPT | Mod: 25 | Performed by: OBSTETRICS & GYNECOLOGY

## 2023-12-09 PROCEDURE — 59025 FETAL NON-STRESS TEST: CPT | Mod: 26 | Performed by: STUDENT IN AN ORGANIZED HEALTH CARE EDUCATION/TRAINING PROGRAM

## 2023-12-09 PROCEDURE — 250N000013 HC RX MED GY IP 250 OP 250 PS 637

## 2023-12-09 PROCEDURE — 250N000011 HC RX IP 250 OP 636

## 2023-12-09 RX ADMIN — ONDANSETRON 4 MG: 4 TABLET, ORALLY DISINTEGRATING ORAL at 02:43

## 2023-12-09 RX ADMIN — ESCITALOPRAM OXALATE 10 MG: 10 TABLET ORAL at 08:00

## 2023-12-09 ASSESSMENT — ACTIVITIES OF DAILY LIVING (ADL)
ADLS_ACUITY_SCORE: 18

## 2023-12-09 NOTE — PROGRESS NOTES
Patient requesting to stay inpatient overnight. Reporting that she feels concerned going home with contraction frequency and associated pain. Discussed this with Dr. Cleary, with plan to reevaluate discharge home in the morning.

## 2023-12-09 NOTE — PROVIDER NOTIFICATION
12/09/23 0727   Provider Notification   Provider Name/Title Dr Cleary   Method of Notification At Bedside   Request Evaluate in Person   Notification Reason SVE     Pt SVE remains the same as previous exams 1.5/0/-3. Plan per Dr cleary will be to discharge patient to home when she is more awake this morning.

## 2023-12-09 NOTE — PLAN OF CARE
VSS. Afebrile. Patient resting well between cares overnight. Denies LOF. Continues to endorse pain with contractions overnight. See flowsheets for FHR and uterine monitoring documentation. Call light within reach and patient verbalizes that she will call out with any change in status.

## 2023-12-09 NOTE — DISCHARGE INSTRUCTIONS
Discharge Instruction for Undelivered Patients      You were seen for: Labor Assessment  We Consulted: Maternal Fetal Medicine    Diet:   Drink 8 to 12 glasses of liquids (milk, juice, water) every day.  You may eat meals and snacks.     Activity:  Call your doctor or nurse midwife if your baby is moving less than usual.     Call your provider if you notice:  Swelling in your face or increased swelling in your hands or legs.  Headaches that are not relieved by Tylenol (acetaminophen).  Changes in your vision (blurring: seeing spots or stars.)  Nausea (sick to your stomach) and vomiting (throwing up).   Weight gain of 5 pounds or more per week.  Heartburn that doesn't go away.  Signs of bladder infection: pain when you urinate (use the toilet), need to go more often and more urgently.  The bag of dominguez (rupture of membranes) breaks, or you notice leaking in your underwear.  Bright red blood in your underwear.  Abdominal (lower belly) or stomach pain.  For first baby: Contractions (tightening) less than 5 minutes apart for one hour or more.  Second (plus) baby: Contractions (tightening) less than 10 minutes apart and getting stronger.  *If less than 34 weeks: Contractions (tightening) more than 6 times in one hour.  Increase or change in vaginal discharge (note the color and amount)  Other:     Follow-up:  As scheduled in the clinic

## 2023-12-09 NOTE — PLAN OF CARE
Goal Outcome Evaluation:      Plan of Care Reviewed With: patient    Overall Patient Progress: no changeOverall Patient Progress: no change         Patient has started to feel more intense contractions reported starting around 1630. Dr Libra Little notified. RN to bedside for assessment, patient reports feeling contractions every 10 mins, contractions palpate mild on assessment. FHT remains appropriate, moderate variability present, accelerations present, baseline 130bpm, contractions recording every 1-6 minutes irregularly. Patient was able to eat dinner this evening and VSS. RN flyer called to remove midline IV catheter per patient request. Patient instructed to report to bedside RN if she feels contractions increase in intensity. Will continue with close maternal and fetal surveillance.

## 2023-12-09 NOTE — PLAN OF CARE
Goal Outcome Evaluation:      Plan of Care Reviewed With: patient    Overall Patient Progress: improvingOverall Patient Progress: improving         Patient okayed to be discharged to home per Dr Cleary and M team. Discharge instructions given to patient, patient denies any questions at this time. Patient understands she has a follow up appointment scheduled on Dec 14th. IV was removed. Patient instructed to  her prescription at her pharmacy listed, patient agreed she was able to do that. Patient is eating breakfast and waiting for her ride to home.

## 2023-12-09 NOTE — PROGRESS NOTES
[facility]  Antepartum Progress Note    S:   Patient fast asleep. Amenable to cervical exam.    O:   Patient Vitals for the past 4 hrs:   BP Temp Temp src Resp   23 0728 123/56 98.3  F (36.8  C) Oral 16       Gen: NAD  SVE: 1.5/60/-3; Chaperoned by RN  Membranes: Intact    FHT  Baseline: 140  Variability: Moderate  Accels: Present  Decels: Absent  [place]: 2-3 in 10 minutes    A/P:  Myrtle Strickland is a 28 year old  at 33w0d by 6w4d US, here for r/o PTL. Stable cervical exam over 24 hours, safe for discharge to home today.    #  Labor:   # History of pre-term delivery x2  # Hx CS  - Cervix: 1/50/-3 > 1.5/60/-3, admit for  labor; unchanged over 24 hours  - s/p 6 hrs Nifed   - s/p BMZ x2  - Membranes: ROMPlus negative  - Labs: UA reflex UC, GC/C, Wet Prep, GBS  - Desires TOLAC, s/p consent in clinic.     # Ornithine transcarbamylase deficiency   - s/p MFM consult  - MFM recs for intrapartum management:               - Continuous IV D10 intrapartum and postpartum  - QID BG checks, mSSI; Insulin gtt prn  - Contact genetics and metabolism if no enteral intake estimated for >12 hours  - Q6 hour ammonia levels  - Contact Genetics and Metabolism on call provider for management of the  after delivery. (See letter 22 for detailed plan of care)        # Fetal Well-Being  - Continuous Monitoring   - FHT: Category 1     # PNC  - Rh neg, GBS unknown  - GCT: wnl  - TOLAC consent signed   - Desires PPTL, FTP signed     #PNC:     - BMI 25  - Rh neg, Rubella immune, GCT wnl  - Hep B Antigen neg  - GBS neg  - Other infectious labs neg  - Placenta: anterior and fundal    Patient discussed with Dr. Vladimir Cleary MD  Ob/Gyn Resident, PGY-3  2023 7:31 AM

## 2023-12-13 ENCOUNTER — HOSPITAL ENCOUNTER (OUTPATIENT)
Facility: CLINIC | Age: 28
Discharge: HOME | End: 2023-12-13
Attending: OBSTETRICS & GYNECOLOGY | Admitting: OBSTETRICS & GYNECOLOGY
Payer: COMMERCIAL

## 2023-12-13 VITALS — DIASTOLIC BLOOD PRESSURE: 68 MMHG | TEMPERATURE: 98 F | RESPIRATION RATE: 16 BRPM | SYSTOLIC BLOOD PRESSURE: 121 MMHG

## 2023-12-13 PROBLEM — Z36.89 ENCOUNTER FOR TRIAGE IN PREGNANT PATIENT: Status: ACTIVE | Noted: 2023-12-13

## 2023-12-13 LAB
CRYSTALS AMN MICRO: NORMAL
RUPTURE OF FETAL MEMBRANES BY ROM PLUS: NEGATIVE

## 2023-12-13 PROCEDURE — 84112 EVAL AMNIOTIC FLUID PROTEIN: CPT

## 2023-12-13 PROCEDURE — G0463 HOSPITAL OUTPT CLINIC VISIT: HCPCS

## 2023-12-13 RX ORDER — LIDOCAINE 40 MG/G
CREAM TOPICAL
Status: DISCONTINUED | OUTPATIENT
Start: 2023-12-13 | End: 2023-12-13 | Stop reason: HOSPADM

## 2023-12-13 ASSESSMENT — ACTIVITIES OF DAILY LIVING (ADL)
ADLS_ACUITY_SCORE: 35

## 2023-12-13 NOTE — PROGRESS NOTES
Obstetrics Triage Note    HPI:    Myrtle Strickland is a 28 year old  at 33w4d by 6w4d US, here with complaints of painful contractions and concern for ROM. Pregnancy complicated as below.    She states that around 1am she was up to the bathroom and had several large gushes of clear fluid into the toilet. She has had painful contractions for weeks, shares that her contractions became more painful after the fluid leakage and that it seemed as if her fluid was leaking with each contraction.  Denies VB since she was last discharged. Good FM. She denies F/C, HA, vision changes, loss of taste/smell, cough, sore throat, CP, SOB, RUQ pain, N/V, dysuria, constipation, diarrhea, increased edema.    Pregnancy is complicated by:  History of  delivery at 34 weeks  - s/p BMZ (-)  - UA/Ucx, gc/ct, WP neg ()  Hx PTD at 34w  Hx CS at 34w - PPROM, breech   Hx MACK, in remission  Ornithine Transcarbamylase deficiency (OTC)  Anxiety, depression, ADHD, bipolar disorder, and autism spectrum disorder  Non epileptic seizure disorder  Hx eating disorder    PMH:  Past Medical History:   Diagnosis Date    ADHD (attention deficit hyperactivity disorder)     Anxiety     Anxiety disorder 10/15/2008    Asperger's disorder 2009    Attention deficit hyperactivity disorder (ADHD) 10/15/2008    Formatting of this note might be different from the original.  LW Onset:  [id number]    Benign joint hypermobility 2010    Bipolar disorder (H)      delivery delivered 2022    Chronic back pain 10/16/2023    Conversion disorder with seizures or convulsions 2019    Depressive disorder     Developmental expressive writing disorder 2009    Headache 2023    History of anemia 10/16/2023    History of asthma 2013    History of eating disorder     History of premature delivery 10/16/2023    07/01/19: PTD@34.4wks, PPROM, @ [facility],, UC Q trimester both previous pregnancies at 34 wks, MFM referral ordered  "   History of substance use     opiate    History of substance use disorder 10/16/2023    In remission. Hx of relapse after last delivery. Avoid narcotics in labor and delivery.     Hyperemesis gravidarum 10/17/2023    Major depressive disorder, recurrent episode, moderate (H) 2023    Migraine 2023    Mixed anxiety and depressive disorder 10/05/2022    started on sertraline 50mg at 8wks started on sertraline 50mg at 8wks    Orthostatic hypotension 2011    OTC (ornithine transcarbamylase deficiency) (H24)     family hx     delivery 2019    Psychogenic nonepileptic seizure     Rh negative state in antepartum period 10/16/2023    Needs rhogam at 28 weeks    Rubella non-immune status, antepartum     Seizures (H) 2019    no meds, last seizure 2022, had neuro referral with last pregnancy and was told it was \"anxiety induced\"- she wants to treat with sertraline only. no meds, last seizure 2022, referral sent to neuro, possible seizure 22, has neuro appt 22--feels anxiety induced and wants to treat with sertraline only. PER GUIDELINES - does not qualify for CNM care    Severe major depression without psychotic features (H) 2010    Syncope and collapse 2008    Formatting of this note might be different from the original. LW Modifier:  3/10/08 ; Syncope Formatting of this note might be different from the original. Formatting of this note might be different from the original. LW Modifier:  3/10/08 ; Syncope    Tobacco use disorder 2011    Formatting of this note might be different from the original. Tobacco Abuse Formatting of this note might be different from the original. Formatting of this note might be different from the original. Tobacco Abuse    Uncomplicated asthma     no inhaler use since high school    Vaginal bleeding before 22 weeks gestation 2023       PSHx:  Past Surgical History:   Procedure Laterality Date    AS REMOVAL OF TONSILS,<12 " "Y/O       SECTION N/A 2022    Procedure:  SECTION;  Surgeon: Gina Pascual MD;  Location: [facility] L[facility]    MYRINGOTOMY, INSERT TUBE BILATERAL, COMBINED      MYRINGOTOMY, INSERT TUBE BILATERAL, COMBINED       Medications:  No current facility-administered medications for this encounter.     Allergies:   Allergies   Allergen Reactions    Adhesive Tape Other (See Comments), Hives and Nausea     Burns only with waterproof bandages. [facility] hospital Tegaderm and tape is okay.    Koehler only with waterproof bandages. [facility] Tegaderm and tape is okay.   \"burns\"    Dextromethorphan-Guaifenesin Hives    Guaifenesin Hives and Nausea and Vomiting    Latex Hives, Itching and Rash    Morphine And Related Hives    Mushroom Anaphylaxis    Mushroom Extract Complex Anaphylaxis and Unknown    Other (Do Not Use) Hives and Other (See Comments)     Burns only with waterproof bandages. [facility] hospital Tegaderm and tape is okay.   \"burns\"     ROS: 10-point ROS negative except as indicated in HPI.    Physical Exam:  /68 (BP Location: Right arm, Patient Position: Semi-Estrada's, Cuff Size: Adult Regular)   Temp 98  F (36.7  C) (Oral)   Resp 16   LMP 2023 (Approximate)   General: alert, oriented female, resting in bed in NAD; uncomfortable with contractions  CV: regular rate and rhythm  Lungs: clear bilaterally, no crackles or wheezes.  Abdomen: soft, gravid, non-tender  Extremities: bilateral lower extremities non-tender with 1+ edema    SSE: Minimal amount of creamy physiologic discharge present, no abnormal odor; no pooling; cervix appears closed. ROM plus and swabs for ferning collected.  SVE: 1/50/-3 (0315); 1/50/-3 on repeat exam (0500)  Membranes: Intact    FHT  Baseline: 135  Variability: Moderate  Accels: Present  Decels: Absent  Ducor: 2-5 in 10 minutes, underlying uterine irritability    A/P:   Myrtle Strickland is a 28 year old  at 33w4d by 6w4d US, here with complaints of painful " contractions and concern for ROM. Pregnancy complicated as below:  - History of  delivery at 34 weeks  - s/p BMZ (-)  - UA/Ucx, gc/ct, WP neg ()  - Hx PTD at 34w  - Hx CS at 34w - PPROM, breech   - Hx MACK, in remission  - Ornithine Transcarbamylase deficiency (OTC)  - Anxiety, depression, ADHD, bipolar disorder, and autism spectrum disorder  - Non epileptic seizure disorder  - Hx eating disorder    #Rule-out  labor  #Rule-out PPROM  #History of  delivery x2  #History of CS  Javier painfully on presentation with contractions appreciated on tocometry, however with a background of uterine irritability. While awaiting swab results, contractions spaced on tocometry and patient was able to fall asleep. Cervix unchanged over the course of greater than 1.5 hours. Swabs to evaluate for ROM (ROM plus and ferning) negative. Overall reassuring against  labor and PPROM. Reviewed return precautions with patient, who was amenable to discharge home.  - UA reflex UC, GC/C, wet prep all recently wnl (23)  - ROMPlus negative, no ferning  - S/p BMZ (, )  - Desires TOLAC, s/p consent in clinic    #Fetal Well Being  - FHT reactive and reassuring  - Continuous EFM  - Interventions PRN    #Ornithine transcarbamylase deficiency   - s/p MFM consult  - MFM recs for intrapartum management:               - Continuous IV D10 intrapartum and postpartum  - QID BG checks, mSSI; Insulin gtt prn  - Contact genetics and metabolism if no enteral intake estimated for >12 hours  - Q6 hour ammonia levels  - Contact Genetics and Metabolism on call provider for management of the  after delivery. (See letter 22 for detailed plan of care)      #PNC  - Rh neg, GBS pending, Rubella immune, GCT wnl  - TOLAC consent signed   - Desires PPTL, FTP  - BMI 25  - Hep B Antigen neg  - Other infectious labs neg  - Placenta: anterior and fundal    #Dispo:   - To home with OB/GYN follow-up tomorrow  -  Discussed Tylenol for pain as needed  - Discussed labor warning signs and indications to return to care including: contractions, vaginal bleeding, leakage of fluid, decreased fetal movement, or fever over 100.4F    Patient staffed with Dr. Fernanda Yarbrough.    Jenny Carrillo MD  Obstetrics & Gynecology, PGY-2  12/13/2023 5:04 AM    Patient reviewed with me, assessment and plan jointly made.   Fernanda Yarbrough MD

## 2023-12-13 NOTE — DISCHARGE INSTRUCTIONS
Discharge Instruction for Undelivered Patients      You were seen for: Labor Assessment  We Consulted: Dr Carrillo and Dr lee   You had (Test or Medicine): Fetal monitoring and labs      Diet:   Drink 8 to 12 glasses of liquids (milk, juice, water) every day.  You may eat meals and snacks.     Activity:  Count fetal kicks everyday (see handout)     Call your provider if you notice:  Swelling in your face or increased swelling in your hands or legs.  Headaches that are not relieved by Tylenol (acetaminophen).  Changes in your vision (blurring: seeing spots or stars.)  Nausea (sick to your stomach) and vomiting (throwing up).   Weight gain of 5 pounds or more per week.  Heartburn that doesn't go away.  Signs of bladder infection: pain when you urinate (use the toilet), need to go more often and more urgently.  The bag of dominguez (rupture of membranes) breaks, or you notice leaking in your underwear.  Bright red blood in your underwear.  Abdominal (lower belly) or stomach pain.  For first baby: Contractions (tightening) less than 5 minutes apart for one hour or more.  *If less than 34 weeks: Contractions (tightening) more than 6 times in one hour.  Increase or change in vaginal discharge (note the color and amount)    Follow-up:  As scheduled in the clinic

## 2023-12-14 ENCOUNTER — ANCILLARY PROCEDURE (OUTPATIENT)
Dept: ULTRASOUND IMAGING | Facility: CLINIC | Age: 28
End: 2023-12-14
Attending: REGISTERED NURSE
Payer: COMMERCIAL

## 2023-12-14 ENCOUNTER — PRENATAL OFFICE VISIT (OUTPATIENT)
Dept: OBGYN | Facility: CLINIC | Age: 28
End: 2023-12-14
Attending: REGISTERED NURSE
Payer: COMMERCIAL

## 2023-12-14 VITALS
HEIGHT: 65 IN | SYSTOLIC BLOOD PRESSURE: 110 MMHG | WEIGHT: 153.9 LBS | DIASTOLIC BLOOD PRESSURE: 77 MMHG | BODY MASS INDEX: 25.64 KG/M2 | HEART RATE: 73 BPM

## 2023-12-14 DIAGNOSIS — Z87.51 HISTORY OF PREMATURE DELIVERY: ICD-10-CM

## 2023-12-14 DIAGNOSIS — Z67.91 RH NEGATIVE STATE IN ANTEPARTUM PERIOD: ICD-10-CM

## 2023-12-14 DIAGNOSIS — Z78.9 NONIMMUNE TO HEPATITIS B VIRUS: ICD-10-CM

## 2023-12-14 DIAGNOSIS — O26.899 RH NEGATIVE STATE IN ANTEPARTUM PERIOD: ICD-10-CM

## 2023-12-14 DIAGNOSIS — Z98.891 HISTORY OF CESAREAN DELIVERY: ICD-10-CM

## 2023-12-14 DIAGNOSIS — Z87.898 HISTORY OF SUBSTANCE USE DISORDER: ICD-10-CM

## 2023-12-14 DIAGNOSIS — Z28.39 RUBELLA NON-IMMUNE STATUS, ANTEPARTUM: ICD-10-CM

## 2023-12-14 DIAGNOSIS — O09.899 RUBELLA NON-IMMUNE STATUS, ANTEPARTUM: ICD-10-CM

## 2023-12-14 DIAGNOSIS — O09.90 HIGH-RISK PREGNANCY, UNSPECIFIED TRIMESTER: ICD-10-CM

## 2023-12-14 DIAGNOSIS — F41.8 MIXED ANXIETY AND DEPRESSIVE DISORDER: ICD-10-CM

## 2023-12-14 DIAGNOSIS — O09.90 HIGH-RISK PREGNANCY, UNSPECIFIED TRIMESTER: Primary | ICD-10-CM

## 2023-12-14 PROBLEM — Z36.89 ENCOUNTER FOR TRIAGE IN PREGNANT PATIENT: Status: RESOLVED | Noted: 2023-12-13 | Resolved: 2023-12-14

## 2023-12-14 PROBLEM — F32.A DEPRESSIVE DISORDER: Status: RESOLVED | Noted: 2023-10-17 | Resolved: 2023-12-14

## 2023-12-14 PROBLEM — R51.9 HEADACHE: Status: RESOLVED | Noted: 2023-08-23 | Resolved: 2023-12-14

## 2023-12-14 PROBLEM — F41.9 ANXIETY DISORDER, UNSPECIFIED TYPE: Status: RESOLVED | Noted: 2023-12-09 | Resolved: 2023-12-14

## 2023-12-14 PROCEDURE — 76819 FETAL BIOPHYS PROFIL W/O NST: CPT

## 2023-12-14 PROCEDURE — 99207 PR PRENATAL VISIT: CPT | Performed by: ADVANCED PRACTICE MIDWIFE

## 2023-12-14 PROCEDURE — 87653 STREP B DNA AMP PROBE: CPT | Performed by: ADVANCED PRACTICE MIDWIFE

## 2023-12-14 PROCEDURE — G0463 HOSPITAL OUTPT CLINIC VISIT: HCPCS | Mod: 25 | Performed by: ADVANCED PRACTICE MIDWIFE

## 2023-12-14 PROCEDURE — 76819 FETAL BIOPHYS PROFIL W/O NST: CPT | Mod: 26 | Performed by: OBSTETRICS & GYNECOLOGY

## 2023-12-14 RX ORDER — FERROUS SULFATE 325(65) MG
325 TABLET ORAL
Qty: 90 TABLET | Refills: 2 | Status: SHIPPED | OUTPATIENT
Start: 2023-12-14 | End: 2024-03-15

## 2023-12-14 RX ORDER — MULTIVIT WITH MINERALS/LUTEIN
250 TABLET ORAL DAILY
Qty: 90 TABLET | Refills: 2 | Status: SHIPPED | OUTPATIENT
Start: 2023-12-14

## 2023-12-14 RX ORDER — PROCHLORPERAZINE MALEATE 10 MG
10 TABLET ORAL EVERY 6 HOURS PRN
Qty: 28 TABLET | Refills: 0 | Status: SHIPPED | OUTPATIENT
Start: 2023-12-14 | End: 2023-12-29

## 2023-12-14 RX ORDER — LANOLIN ALCOHOL/MO/W.PET/CERES
1000 CREAM (GRAM) TOPICAL DAILY
Qty: 90 TABLET | Refills: 2 | Status: SHIPPED | OUTPATIENT
Start: 2023-12-14 | End: 2024-03-15

## 2023-12-14 NOTE — PROGRESS NOTES
"Subjective:      28 year old  at 33w5d presentst for a routine prenatal appointment.    Denies vaginal bleeding or leakage of fluid.  Denies contractions. Active fetal movement.       No HA, visual changes, RUQ or epigastric pain.   Patient concerns:   1: Patient  reports being in prodromal labor for three weeks,. She was evaluated in L&D on 23 with complaints of irregular contractions lasting 1.5- 7 minutes cervix  unchanged (/-3). UA, GC/CT and wet prep were negative. She has received betamethasone x2 ( and ).She was discharged home but the contractions are persistent. She  has tried warm packs, vistaril 25 mg and tylenol. She reports no relief with tylenol  and some relief with vistaril which makes her sometimes sleepy. She reports mixed emotions including laughing, crying and moaning with the contractions.  2: She has hyperemesis  gravidarum with this pregnancy and other pregnancies and  reports vomiting as she was waiting to be seen today. She uses Zofran disintegrating tablet once a day and reports inadequate relief and is aware that she can take zofran q8hr . She is not able to drink much water because she feels like it makes her nausea worse. She does not want Reglan because it causes anxiety attacks .  Feeling well overall, she had a BPP today  with fetal heart tones of 144b/min. Presentation if mary breech.  Reviewed EOB labs with patient.  Reviewed TDAP Previously given  Objective:  Vitals:    23 1135   BP: 110/77   Pulse: 73   Weight: 69.8 kg (153 lb 14.4 oz)   Height: 1.651 m (5' 5\")   , see ob flowsheet            Orders Placed This Encounter   Procedures    US OB Follow Up >14 Weeks    US OB Fetal Biophys Prf wo [facility]     Orders Placed This Encounter   Medications    prochlorperazine (COMPAZINE) 10 MG tablet     Sig: Take 1 tablet (10 mg) by mouth every 6 hours as needed for nausea or vomiting     Dispense:  28 tablet     Refill:  0    vitamin C (ASCORBIC " ACID) 250 MG tablet     Sig: Take 1 tablet (250 mg) by mouth daily     Dispense:  90 tablet     Refill:  2    cyanocobalamin (VITAMIN B-12) 1000 MCG tablet     Sig: Take 1 tablet (1,000 mcg) by mouth daily     Dispense:  90 tablet     Refill:  2    ferrous sulfate (FEROSUL) 325 (65 Fe) MG tablet     Sig: Take 1 tablet (325 mg) by mouth daily (with breakfast)     Dispense:  90 tablet     Refill:  2     Antibody Screen   Date Value Ref Range Status   2023 Positive (A) Negative Final   , Rhogam  was given-11/3/2023    Assessment/Plan  (O09.90) High-risk pregnancy, unspecified trimester  (primary encounter diagnosis)  Comment:   - Reviewed total weight gain, encouraged continued healthy diet and exercise.  .  Reviewed importance of daily fetal kick count and why/how to contact provider.  - Reviewed why/how to contact provider if headache/visual changes/RUQ or epigastric pain, decreased fetal movement, vaginal bleeding, leakage of fluid or more than 4 contractions in an hour.   -Patient education/orders or handouts today: PTL signs/symptoms  -GBS obtained today-results pending   -Weekly BPPs and monthly growth ultrasound ordered  Plan: US OB Follow Up >14 Weeks, US OB Fetal Biophys         Prf wo [employer], prochlorperazine         (COMPAZINE) 10 MG tablet, vitamin C (ASCORBIC         ACID) 250 MG tablet, cyanocobalamin (VITAMIN         B-12) 1000 MCG tablet, ferrous sulfate         (FEROSUL) 325 (65 Fe) MG tablet            (Z98.891) History of  delivery  Comment: Patient plans a tolac if presentation is cephalic. Advised to utilize spinning babies positions to facilitate rotation  Plan: TOLAC consent signed on 11/3/2023    (Z78.9) Nonimmune to hepatitis B virus  Comment: Previously immunized and completed series      (O09.899,  Z28.39) Rubella non-immune status, antepartum  Comment: Will plan to offer MMR postpartum      (O26.899,  Z67.91) Rh negative state in antepartum period  Comment: Received  Rhogam 11/3/23    (Z87.51) History of premature delivery  Comment: Weekly BPPs ordered and monthly growth ultrasounds  Plan: US OB Follow Up >14 Weeks, US OB Fetal Biophys         Prf wo [employer]            (Z87.898) History of substance use disorder  Comment: Denies current use. Weekly BPPs ordered and monthly growth ultrasounds  Plan: US OB Follow Up >14 Weeks, US OB Fetal Biophys         Prf wo [employer] [facility]    -discussed using 50mg vistaril at night to help with contx.    Return to clinic in 2 weeks and prn if questions or concerns.     I, Sarah Arvizu, am serving as a scribe; to document services personally performed by  Janeen Shirley DNP, APRN, CNM, FACNM   based on data collection and the provider's statements to me.     Sarah Arvizu    I agree with the PFSH and ROS as completed by the student, except for changes made by me. The remainder of the encounter scribed by the student. The scribed note accurately reflects my personal services and decisions made by me.  Janeen Shirley, AGUSTO, LAKISHA, APRN

## 2023-12-15 ENCOUNTER — MYC MEDICAL ADVICE (OUTPATIENT)
Dept: OBGYN | Facility: CLINIC | Age: 28
End: 2023-12-15
Payer: COMMERCIAL

## 2023-12-15 LAB — GP B STREP DNA SPEC QL NAA+PROBE: NEGATIVE

## 2023-12-15 NOTE — TELEPHONE ENCOUNTER
"Per CNM:    \"This patient is planning MD care in labor.  Encourage patient to schedule visit with MD team to discuss concerns, or present to triage for assessment.   Kesha Fylnn, APRN CNM\"    Called patient, reached . Unable to LVM as pt mailbox not set up.     Sent patient zlienhart message.  "

## 2023-12-15 NOTE — TELEPHONE ENCOUNTER
"Called patient regarding MyChart message. Patient reports her contractions remain consistent, though she has been to the hospital twice and doesn't want to go back at this time. Offered clinic visit, pt declines as she was seen yesterday. Reports that after her visit yesterday she was instructed to increase her vistaril, though it \"does nothing,\" and after increasing her dose last night she was up until 5 am with pain/discomfort. Reports her symptoms are starting to \"mess with her on a mental health level.\" She denies thoughts of harming herself, her children or others, reports feeling anxious / depressed and exhausted. She reports it's a mixture of pain and not sleeping. She reports being \"at her wits end\" and feels like she's \"losing her mind.\" She has mental health care currently and sees her provider weekly, feels safe and comfortable at home. She requests a message be sent to the midwives to see if there is anything else she can do at this time to help with her discomfort. Routed to CNM team for any recommendations.  "

## 2023-12-15 NOTE — TELEPHONE ENCOUNTER
Called patient, pt requesting visit with LAKISHA Egan. Pt wishes to have CNM care if her pregnancy continues past 34 weeks. Assisted with scheduling per patient request.

## 2023-12-21 ENCOUNTER — ANCILLARY PROCEDURE (OUTPATIENT)
Dept: ULTRASOUND IMAGING | Facility: CLINIC | Age: 28
End: 2023-12-21
Attending: ADVANCED PRACTICE MIDWIFE
Payer: COMMERCIAL

## 2023-12-21 ENCOUNTER — PRENATAL OFFICE VISIT (OUTPATIENT)
Dept: OBGYN | Facility: CLINIC | Age: 28
End: 2023-12-21
Attending: REGISTERED NURSE
Payer: COMMERCIAL

## 2023-12-21 ENCOUNTER — TELEPHONE (OUTPATIENT)
Dept: OBGYN | Facility: CLINIC | Age: 28
End: 2023-12-21

## 2023-12-21 VITALS
HEIGHT: 65 IN | DIASTOLIC BLOOD PRESSURE: 84 MMHG | WEIGHT: 155 LBS | HEART RATE: 82 BPM | SYSTOLIC BLOOD PRESSURE: 120 MMHG | BODY MASS INDEX: 25.83 KG/M2

## 2023-12-21 DIAGNOSIS — Z87.898 HISTORY OF SUBSTANCE USE DISORDER: ICD-10-CM

## 2023-12-21 DIAGNOSIS — Z28.39 RUBELLA NON-IMMUNE STATUS, ANTEPARTUM: ICD-10-CM

## 2023-12-21 DIAGNOSIS — Z98.891 HISTORY OF CESAREAN DELIVERY: ICD-10-CM

## 2023-12-21 DIAGNOSIS — Z87.51 HISTORY OF PREMATURE DELIVERY: ICD-10-CM

## 2023-12-21 DIAGNOSIS — O09.90 HIGH-RISK PREGNANCY, UNSPECIFIED TRIMESTER: ICD-10-CM

## 2023-12-21 DIAGNOSIS — O09.899 RUBELLA NON-IMMUNE STATUS, ANTEPARTUM: ICD-10-CM

## 2023-12-21 DIAGNOSIS — O09.90 HIGH-RISK PREGNANCY, UNSPECIFIED TRIMESTER: Primary | ICD-10-CM

## 2023-12-21 DIAGNOSIS — Z67.91 RH NEGATIVE STATE IN ANTEPARTUM PERIOD: ICD-10-CM

## 2023-12-21 DIAGNOSIS — O26.899 RH NEGATIVE STATE IN ANTEPARTUM PERIOD: ICD-10-CM

## 2023-12-21 PROCEDURE — 99207 PR PRENATAL VISIT: CPT | Performed by: REGISTERED NURSE

## 2023-12-21 PROCEDURE — 76819 FETAL BIOPHYS PROFIL W/O NST: CPT

## 2023-12-21 PROCEDURE — G0463 HOSPITAL OUTPT CLINIC VISIT: HCPCS | Mod: 25 | Performed by: REGISTERED NURSE

## 2023-12-21 PROCEDURE — 76819 FETAL BIOPHYS PROFIL W/O NST: CPT | Mod: 26 | Performed by: OBSTETRICS & GYNECOLOGY

## 2023-12-21 RX ORDER — TERBUTALINE SULFATE 1 MG/ML
0.25 INJECTION, SOLUTION SUBCUTANEOUS ONCE
Status: SHIPPED | OUTPATIENT
Start: 2023-12-21

## 2023-12-21 RX ORDER — LIDOCAINE 40 MG/G
CREAM TOPICAL
Status: ACTIVE | OUTPATIENT
Start: 2023-12-21

## 2023-12-21 ASSESSMENT — PAIN SCALES - GENERAL: PAINLEVEL: SEVERE PAIN (6)

## 2023-12-21 NOTE — PROGRESS NOTES
"Subjective:      28 year old  at 34w5d presentst for a routine prenatal appointment.    no vaginal bleeding or leakage of fluid.  Intermittent painful contractions- has been seen in triage a few times. No change since last visit. Reports regular fetal movement.       No HA, visual changes, RUQ or epigastric pain.   Feeling well overall.  Reviewed TDAP previously given 23    Reviewed US results: BPP 8/8, MVP 5.7cm, BREECH,     Patient concerns:   - Midline IV was removed inpatient because it was leaking. Does not plan to have it in place. Able to keep some fluids down. Food is hit or miss, but able to keep some down.     Objective:  Vitals:    23 1259   BP: 120/84   Pulse: 82   Weight: 70.3 kg (155 lb)   Height: 1.651 m (5' 5\")   , see ob flowsheet  Assessment/Plan     Encounter Diagnoses   Name Primary?    High-risk pregnancy, unspecified trimester Yes    Rubella non-immune status, antepartum     Rh negative state in antepartum period     History of substance use disorder     History of  delivery     Breech presentation, single or unspecified fetus      No orders of the defined types were placed in this encounter.    No orders of the defined types were placed in this encounter.    Blood type: B NEG   Antibody Screen   Date Value Ref Range Status   2023 Positive (A) Negative Final    Rhogam was given previously.    - US report stated baby was cephalic, but preliminary report stated breech. Handheld BSUS utilized and confirmed breech fetal position.     - Discussed breech presentation of fetus today. Reviewed/offered breech version at 37+2 (37 weeks falls on Saturday). She would like to move forward with this. Will consider repeat version at 39 weeks and either IOL or RPCS if unsuccessful. ECV  scheduled 2023 @ 1200 with spinal anesthesia desired. Case request will be placed by MD.     - Reviewed total weight gain, encouraged continued healthy diet and exercise.  Reviewed " importance of daily fetal kick count and why/how to contact provider.    - Reviewed why/how to contact provider if headache/visual changes/RUQ or epigastric pain, decreased fetal movement, vaginal bleeding, leakage of fluid or more than 4 contractions in an hour.     Patient education/orders or handouts today:  PTL signs/symptoms  Reviewed GBS negative 12/14/23, expires 1/18/24    Return to clinic in 1-2 weeks and prn if questions or concerns.     JACOB BetancurM

## 2023-12-21 NOTE — TELEPHONE ENCOUNTER
Ken with [facility] called.  He saw her today and her midline was out- he was not sure if they need to keep her on service.      Per Gonzalo's note from today, her midline was removed and she does not plan to have it in place- she is able to keep some fluids down.      I read Ken this note, and he said he will discharge her from their service.

## 2023-12-28 ENCOUNTER — ANCILLARY PROCEDURE (OUTPATIENT)
Dept: ULTRASOUND IMAGING | Facility: CLINIC | Age: 28
End: 2023-12-28
Attending: REGISTERED NURSE
Payer: COMMERCIAL

## 2023-12-28 ENCOUNTER — ANCILLARY ORDERS (OUTPATIENT)
Dept: OBGYN | Facility: CLINIC | Age: 28
End: 2023-12-28

## 2023-12-28 DIAGNOSIS — Z67.91 RH NEGATIVE STATE IN ANTEPARTUM PERIOD: ICD-10-CM

## 2023-12-28 DIAGNOSIS — Z98.891 HISTORY OF CESAREAN DELIVERY: ICD-10-CM

## 2023-12-28 DIAGNOSIS — O09.899 RUBELLA NON-IMMUNE STATUS, ANTEPARTUM: ICD-10-CM

## 2023-12-28 DIAGNOSIS — O09.90 HIGH-RISK PREGNANCY, UNSPECIFIED TRIMESTER: ICD-10-CM

## 2023-12-28 DIAGNOSIS — N30.00 ACUTE CYSTITIS WITHOUT HEMATURIA: ICD-10-CM

## 2023-12-28 DIAGNOSIS — Z28.39 RUBELLA NON-IMMUNE STATUS, ANTEPARTUM: ICD-10-CM

## 2023-12-28 DIAGNOSIS — Z87.898 HISTORY OF SUBSTANCE USE DISORDER: ICD-10-CM

## 2023-12-28 DIAGNOSIS — O26.899 RH NEGATIVE STATE IN ANTEPARTUM PERIOD: ICD-10-CM

## 2023-12-28 DIAGNOSIS — O09.90 HIGH-RISK PREGNANCY, UNSPECIFIED TRIMESTER: Primary | ICD-10-CM

## 2023-12-28 PROCEDURE — 76816 OB US FOLLOW-UP PER FETUS: CPT | Mod: 26 | Performed by: OBSTETRICS & GYNECOLOGY

## 2023-12-28 PROCEDURE — 76819 FETAL BIOPHYS PROFIL W/O NST: CPT | Mod: 26 | Performed by: OBSTETRICS & GYNECOLOGY

## 2023-12-28 PROCEDURE — 76819 FETAL BIOPHYS PROFIL W/O NST: CPT

## 2023-12-28 PROCEDURE — 76816 OB US FOLLOW-UP PER FETUS: CPT

## 2023-12-29 ENCOUNTER — PRENATAL OFFICE VISIT (OUTPATIENT)
Dept: OBGYN | Facility: CLINIC | Age: 28
End: 2023-12-29
Attending: REGISTERED NURSE
Payer: COMMERCIAL

## 2023-12-29 VITALS
BODY MASS INDEX: 25.83 KG/M2 | SYSTOLIC BLOOD PRESSURE: 115 MMHG | HEART RATE: 96 BPM | DIASTOLIC BLOOD PRESSURE: 80 MMHG | WEIGHT: 155 LBS | HEIGHT: 65 IN

## 2023-12-29 DIAGNOSIS — Z87.898 HISTORY OF SUBSTANCE USE DISORDER: ICD-10-CM

## 2023-12-29 DIAGNOSIS — O09.90 HIGH-RISK PREGNANCY, UNSPECIFIED TRIMESTER: Primary | ICD-10-CM

## 2023-12-29 DIAGNOSIS — Z98.891 HISTORY OF CESAREAN DELIVERY: ICD-10-CM

## 2023-12-29 DIAGNOSIS — Z28.39 RUBELLA NON-IMMUNE STATUS, ANTEPARTUM: ICD-10-CM

## 2023-12-29 DIAGNOSIS — Z67.91 RH NEGATIVE STATE IN ANTEPARTUM PERIOD: ICD-10-CM

## 2023-12-29 DIAGNOSIS — O09.899 RUBELLA NON-IMMUNE STATUS, ANTEPARTUM: ICD-10-CM

## 2023-12-29 DIAGNOSIS — O26.899 RH NEGATIVE STATE IN ANTEPARTUM PERIOD: ICD-10-CM

## 2023-12-29 PROCEDURE — 99207 PR PRENATAL VISIT: CPT | Performed by: REGISTERED NURSE

## 2023-12-29 PROCEDURE — G0463 HOSPITAL OUTPT CLINIC VISIT: HCPCS | Performed by: REGISTERED NURSE

## 2023-12-29 NOTE — PROGRESS NOTES
"Subjective:      28 year old  at 35w6d presents for a routine prenatal appointment.         no vaginal bleeding,  leakage of fluid, or change in vaginal discharge.  Intermittent contractions, no pattern.  Reports regular fetal movement.       No HA, visual changes, RUQ or epigastric pain.   Patient concerns: none.  Feeling well overall.     Reviewed US : EFW 37%, , MVP 5.4cm, BPP 8/8    Objective:  Vitals:    23 0835   BP: 115/80   Pulse: 96   Weight: 70.3 kg (155 lb)   Height: 1.651 m (5' 5\")    See OB flowsheet    Assessment/Plan     Encounter Diagnoses   Name Primary?    High-risk pregnancy, unspecified trimester Yes    Breech presentation, single or unspecified fetus     Rubella non-immune status, antepartum     Rh negative state in antepartum period     History of substance use disorder     History of  delivery      No orders of the defined types were placed in this encounter.    No orders of the defined types were placed in this encounter.          10/18/2023     2:18 PM 10/20/2023    12:07 PM 2023    12:21 PM   PHQ-9 SCORE   PHQ-9 Total Score MyChart 16 (Moderately severe depression)     PHQ-9 Total Score 16 17 21     GBS screening: previously negative. Expires 24  Birth preferences reviewed: Un-Medicated if labors spontaneously.   Labor signs discussed. Reinforced daily fetal movement counts.  Reviewed why/how to contact provider if headache/visual changes/RUQ or epigastric pain, decreased fetal movement, vaginal bleeding, leakage of fluid.  Return to clinic in 1 week and prn if questions or concerns.     JACOB Betancur CNM    "

## 2023-12-29 NOTE — PATIENT INSTRUCTIONS
Thank you for trusting us with your care!     If you need to contact us for questions about:  Symptoms, Scheduling & Medical Questions; Non-urgent (2-3 day response) [employer] message, Urgent (needing response today) [phone] (if after 3:30pm next day response)   Prescriptions: Please call your Pharmacy   Billing: Monica [phone] or [facility]:[phone]    Weeks 34 to 36 of Your Pregnancy: Care Instructions  Your belly has grown quite large. It's almost time to give birth! Your baby's lungs are almost ready to breathe air. The skull bones are firm enough to protect your baby's head. But they're soft enough to move down through the birth canal.    You might be wondering what to expect during labor. Because each birth is different, there's no way to know exactly what childbirth will be like for you. Talk to your doctor or midwife about any concerns you have.   You'll probably have a test for group B streptococcus (GBS). GBS is bacteria that can live in the vagina and rectum. GBS can make your baby sick after birth. If you test positive, you'll get antibiotics during labor.     Choose what type of pain relief you would like during labor.  You can choose from a few types, including medicine and non-medicine options. You may want to use several types of pain relief.     Know how medicines can help with pain during labor.  Some medicines lower anxiety and help with some of the pain. Others make your lower body numb so that you will feel less pain.     Tell your doctor about your pain medicine choice.  Do this before you start labor or very early in your labor. You may be able to change your mind during labor.     Learn about the stages of labor.    The first stage includes the early (latent) and active phases of labor. Contractions start in early labor. During active labor, contractions get stronger, last longer, and happen more often. And the cervix opens more rapidly.  The second stage starts when you're  "ready to push. During this stage, your baby is born.  During the third stage, you'll usually have a few more contractions to push out the placenta.   Follow-up care is a key part of your treatment and safety. Be sure to make and go to all appointments, and call your doctor if you are having problems. It's also a good idea to know your test results and keep a list of the medicines you take.  Where can you learn more?  Go to https://www.[employer].net/patiented  Enter B912 in the search box to learn more about \"Weeks 34 to 36 of Your Pregnancy: Care Instructions.\"  Current as of: 2023               Content Version: 13.8    9361-8779 [employer].   Care instructions adapted under license by your healthcare professional. If you have questions about a medical condition or this instruction, always ask your healthcare professional. [employer] disclaims any warranty or liability for your use of this information.      Group B Strep During Pregnancy: Care Instructions  Overview     Group B strep infection is caused by a type of bacteria. It's a different kind of bacteria than the kind that causes strep throat.  You may have this kind of bacteria in your body. Sometimes it may cause an infection, but most of the time it doesn't make you sick or cause symptoms. But if you pass the bacteria to your baby during the birth, it can cause serious health problems for your baby.  If you have this bacteria in your body, you will get antibiotics when you are in labor. Antibiotics help prevent problems for a  baby.  After birth, doctors will watch and may test your baby. If your baby tests positive for Group B strep, your baby will get antibiotics.  If you plan to breastfeed your baby, don't worry. It will be safe to breastfeed.  Follow-up care is a key part of your treatment and safety. Be sure to make and go to all appointments, and call your doctor if you are having problems. It's also a good " "idea to know your test results and keep a list of the medicines you take.  How can you care for yourself at home?  If your doctor has prescribed antibiotics, take them as directed. Do not stop taking them just because you feel better. You need to take the full course of antibiotics.  Tell your doctor if you are allergic to any antibiotic.  If you go into labor, or your water breaks, go to the hospital. Your doctor will give you antibiotics to help protect your baby from infection.  Tell the doctors and nurses if you have an allergy to penicillin.  Tell the doctors and nurses at the hospital that you tested positive for group B strep.  When should you call for help?   Call your doctor now or seek immediate medical care if:    You have symptoms of a urinary tract infection. These may include:  Pain or burning when you urinate.  A frequent need to urinate without being able to pass much urine.  Pain in the flank, which is just below the rib cage and above the waist on either side of the back.  Blood in your urine.  A fever.     You think you are in labor or your water has broken.     You have pain in your belly or pelvis.   Watch closely for changes in your health, and be sure to contact your doctor if you have any problems.  Where can you learn more?  Go to https://www.[employer].net/patiented  Enter M001 in the search box to learn more about \"Group B Strep During Pregnancy: Care Instructions.\"  Current as of: June 13, 2023               Content Version: 13.8    0545-3015 [employer].   Care instructions adapted under license by your healthcare professional. If you have questions about a medical condition or this instruction, always ask your healthcare professional. [employer] disclaims any warranty or liability for your use of this information.      "

## 2024-01-04 NOTE — PROGRESS NOTES
Subjective:      28 year old  at 36w6d presents for a problem prenatal appointment with Renan  GBS negative Hgb: 10.4/ Myrtle does not tolerate oral iron, accepts IV iron therapy.    Hx of OTC deficiency: For patients with anemia, it is important to consider that blood transfusion increases protein load and can increase the risk of hyperammonemia  IV iron would not have this same reaction, Reviewed with Dr. Gilbert    BPP weekly, IOL at 39 weeks  Reviewed US : EFW 37%, , MVP 5.4cm, BPP   BPP today , baby is still breech, has ECV scheduled for Monday with a spinal    Hx of NSVB, CS for PPROM at 34 weeks/breech.   Desires TOLAC (consent signed)  Desires PPBTL (consent signed)  Anxiety - taking Lexapro, anxious about delivery, strongly desires vaginal birth    Has had prodromal labor for weeks, is having a contraction every 7 minutes for weeks. Has increased vaginal pressure when she vomits. Denies bleeding.     Hyperemesis gravidarum: vomits 3-5 times a day, nothing has been helping.   Uses Zofran once daily, aware she can take every 8 hours. Had midline IV in place but it was removed because it was leaking. At this point she is just going day to day waiting for the baby to come.    Developed leakage of fluid on Wednesday, she notices a little bit of fluid come out when she shift positions, but she is not sure how much. She has been wearing an adult diaper because she urinates every time she vomits and she didn't notice a lot of fluid leaking but is not sure.      Labor and delivery is closed. Given Myrtle's OTC deficiency and possible need for a CS, recommended that we triage her concern in clinic. RN staff procured a ROM Plus from [facility] and this was run STAT      Objective:  Vitals:    24 1323   BP: 131/77   Pulse: 71    See OB flowsheet  Physical Exam:  General Appearance: Alert, cooperative, no distress, appears stated age  Head: Normocephalic, without obvious abnormality, atraumatic.  Poor dentition  Eyes: Conjunctiva/corneas clear, does not wear corrective lenses    FHT: 145   Sterile speculum exam  Vulva:  no sign of lesions or condyloma, normal hair distribution, vulvar varicosities noted  Vagina: pink, normal rugae, thin white discharge noted, wet prep, multiplex vaginal infection swab collected. ROM Plus and Ferning test collected  Cervix: Appears slightly open, no pooling, no evidence of fluid with valsalva, no lesions or inflammation noted  Uterus:  enlarged approximately 36 weeks size    Extremities: Extremities normal, atraumatic, no cyanosis or edema  Skin: Skin color, texture, turgor normal, no rashes or lesions  Lymph nodes: Cervical, supraclavicular, and axillary nodes normal  Neurologic: Alert and oriented x 3.     Assessment/Plan     Encounter Diagnoses   Name Primary?    High-risk pregnancy, unspecified trimester Yes    Abnormal vaginal fluids     Iron deficiency anemia secondary to inadequate dietary iron intake     Rubella non-immune status, antepartum     Rh negative state in antepartum period     History of substance use disorder     History of  delivery     Bacterial vaginosis in pregnancy      Orders Placed This Encounter   Procedures    US OB Fetal Biophys Prf wo NST [employer]    Rupture of Fetal Membranes by ROM Plus    Wet Prep POCT     Orders Placed This Encounter   Medications    metroNIDAZOLE (FLAGYL) 500 MG tablet     Sig: Take 1 tablet (500 mg) by mouth 2 times daily for 7 days     Dispense:  14 tablet     Refill:  0           10/18/2023     2:18 PM 10/20/2023    12:07 PM 2023    12:21 PM   PHQ-9 SCORE   PHQ-9 Total Score MyChart 16 (Moderately severe depression)     PHQ-9 Total Score 16 17 21     GBS screening: Negative  Birth preferences reviewed: strongly desires vaginal birth  Has ECV scheduled, has not scheduled further prenatal appts  -Reviewed multiplex result showing bacterial vaginosis. Rx metronidazole 500 mg twice a day for 7  days  -Reviewed negative ROM Plus, ferning test and sterile speculum exam for amniotic fluid leaking  -Encouraged Myrtle to schedule future prenatal appointments    Emili Horvath CNM

## 2024-01-05 ENCOUNTER — ANCILLARY PROCEDURE (OUTPATIENT)
Dept: ULTRASOUND IMAGING | Facility: CLINIC | Age: 29
End: 2024-01-05
Attending: REGISTERED NURSE
Payer: COMMERCIAL

## 2024-01-05 ENCOUNTER — PRENATAL OFFICE VISIT (OUTPATIENT)
Dept: OBGYN | Facility: CLINIC | Age: 29
End: 2024-01-05
Attending: REGISTERED NURSE
Payer: COMMERCIAL

## 2024-01-05 ENCOUNTER — ANESTHESIA EVENT (OUTPATIENT)
Dept: OBGYN | Facility: CLINIC | Age: 29
End: 2024-01-05
Payer: COMMERCIAL

## 2024-01-05 VITALS — SYSTOLIC BLOOD PRESSURE: 131 MMHG | DIASTOLIC BLOOD PRESSURE: 77 MMHG | HEART RATE: 71 BPM

## 2024-01-05 DIAGNOSIS — O26.899 RH NEGATIVE STATE IN ANTEPARTUM PERIOD: ICD-10-CM

## 2024-01-05 DIAGNOSIS — O09.899 RUBELLA NON-IMMUNE STATUS, ANTEPARTUM: ICD-10-CM

## 2024-01-05 DIAGNOSIS — D50.8 IRON DEFICIENCY ANEMIA SECONDARY TO INADEQUATE DIETARY IRON INTAKE: ICD-10-CM

## 2024-01-05 DIAGNOSIS — O09.90 HIGH-RISK PREGNANCY, UNSPECIFIED TRIMESTER: ICD-10-CM

## 2024-01-05 DIAGNOSIS — O23.599 BACTERIAL VAGINOSIS IN PREGNANCY: ICD-10-CM

## 2024-01-05 DIAGNOSIS — B96.89 BACTERIAL VAGINOSIS IN PREGNANCY: ICD-10-CM

## 2024-01-05 DIAGNOSIS — Z98.891 HISTORY OF CESAREAN DELIVERY: ICD-10-CM

## 2024-01-05 DIAGNOSIS — Z28.39 RUBELLA NON-IMMUNE STATUS, ANTEPARTUM: ICD-10-CM

## 2024-01-05 DIAGNOSIS — R87.9 ABNORMAL VAGINAL FLUIDS: ICD-10-CM

## 2024-01-05 DIAGNOSIS — Z87.898 HISTORY OF SUBSTANCE USE DISORDER: ICD-10-CM

## 2024-01-05 DIAGNOSIS — Z67.91 RH NEGATIVE STATE IN ANTEPARTUM PERIOD: ICD-10-CM

## 2024-01-05 DIAGNOSIS — O09.90 HIGH-RISK PREGNANCY, UNSPECIFIED TRIMESTER: Primary | ICD-10-CM

## 2024-01-05 LAB
BACTERIAL VAGINOSIS VAG-IMP: POSITIVE
CANDIDA DNA VAG QL NAA+PROBE: NOT DETECTED
CANDIDA GLABRATA / CANDIDA KRUSEI DNA: NOT DETECTED
CLUE CELLS: NEGATIVE
RUPTURE OF FETAL MEMBRANES BY ROM PLUS: NEGATIVE
T VAGINALIS DNA VAG QL NAA+PROBE: NOT DETECTED
TRICHOMONAS (WET PREP): NEGATIVE
WBC (WET PREP): POSITIVE
YEAST (WET PREP): NEGATIVE

## 2024-01-05 PROCEDURE — 87210 SMEAR WET MOUNT SALINE/INK: CPT | Performed by: ADVANCED PRACTICE MIDWIFE

## 2024-01-05 PROCEDURE — 59426 ANTEPARTUM CARE ONLY: CPT | Performed by: ADVANCED PRACTICE MIDWIFE

## 2024-01-05 PROCEDURE — 76819 FETAL BIOPHYS PROFIL W/O NST: CPT

## 2024-01-05 PROCEDURE — 99213 OFFICE O/P EST LOW 20 MIN: CPT | Mod: 25 | Performed by: ADVANCED PRACTICE MIDWIFE

## 2024-01-05 PROCEDURE — G0463 HOSPITAL OUTPT CLINIC VISIT: HCPCS | Mod: 25 | Performed by: ADVANCED PRACTICE MIDWIFE

## 2024-01-05 PROCEDURE — 84112 EVAL AMNIOTIC FLUID PROTEIN: CPT | Performed by: ADVANCED PRACTICE MIDWIFE

## 2024-01-05 PROCEDURE — 0352U MULTIPLEX VAGINAL PANEL BY PCR: CPT | Performed by: ADVANCED PRACTICE MIDWIFE

## 2024-01-05 PROCEDURE — 76819 FETAL BIOPHYS PROFIL W/O NST: CPT | Mod: 26 | Performed by: STUDENT IN AN ORGANIZED HEALTH CARE EDUCATION/TRAINING PROGRAM

## 2024-01-05 RX ORDER — ONDANSETRON 2 MG/ML
4 INJECTION INTRAMUSCULAR; INTRAVENOUS EVERY 30 MIN PRN
Status: CANCELLED | OUTPATIENT
Start: 2024-01-05

## 2024-01-05 RX ORDER — ALBUTEROL SULFATE 0.83 MG/ML
2.5 SOLUTION RESPIRATORY (INHALATION)
Status: CANCELLED | OUTPATIENT
Start: 2024-01-05

## 2024-01-05 RX ORDER — SODIUM CHLORIDE, SODIUM LACTATE, POTASSIUM CHLORIDE, CALCIUM CHLORIDE 600; 310; 30; 20 MG/100ML; MG/100ML; MG/100ML; MG/100ML
INJECTION, SOLUTION INTRAVENOUS CONTINUOUS
Status: CANCELLED | OUTPATIENT
Start: 2024-01-05

## 2024-01-05 RX ORDER — FENTANYL CITRATE-0.9 % NACL/PF 10 MCG/ML
100 PLASTIC BAG, INJECTION (ML) INTRAVENOUS EVERY 5 MIN PRN
Status: CANCELLED | OUTPATIENT
Start: 2024-01-05

## 2024-01-05 RX ORDER — EPINEPHRINE 1 MG/ML
0.3 INJECTION, SOLUTION, CONCENTRATE INTRAVENOUS EVERY 5 MIN PRN
Status: CANCELLED | OUTPATIENT
Start: 2024-01-05

## 2024-01-05 RX ORDER — ALBUTEROL SULFATE 90 UG/1
1-2 AEROSOL, METERED RESPIRATORY (INHALATION)
Status: CANCELLED
Start: 2024-01-05

## 2024-01-05 RX ORDER — METHYLPREDNISOLONE SODIUM SUCCINATE 125 MG/2ML
125 INJECTION, POWDER, LYOPHILIZED, FOR SOLUTION INTRAMUSCULAR; INTRAVENOUS
Status: CANCELLED
Start: 2024-01-05

## 2024-01-05 RX ORDER — OXYCODONE HYDROCHLORIDE 5 MG/1
5 TABLET ORAL
Status: CANCELLED | OUTPATIENT
Start: 2024-01-05

## 2024-01-05 RX ORDER — LABETALOL HYDROCHLORIDE 5 MG/ML
10 INJECTION, SOLUTION INTRAVENOUS
Status: CANCELLED | OUTPATIENT
Start: 2024-01-05

## 2024-01-05 RX ORDER — MEPERIDINE HYDROCHLORIDE 25 MG/ML
25 INJECTION INTRAMUSCULAR; INTRAVENOUS; SUBCUTANEOUS EVERY 30 MIN PRN
Status: CANCELLED | OUTPATIENT
Start: 2024-01-05

## 2024-01-05 RX ORDER — HEPARIN SODIUM,PORCINE 10 UNIT/ML
5-20 VIAL (ML) INTRAVENOUS DAILY PRN
Status: CANCELLED | OUTPATIENT
Start: 2024-01-05

## 2024-01-05 RX ORDER — DIPHENHYDRAMINE HYDROCHLORIDE 50 MG/ML
50 INJECTION INTRAMUSCULAR; INTRAVENOUS
Status: CANCELLED
Start: 2024-01-05

## 2024-01-05 RX ORDER — OXYCODONE HYDROCHLORIDE 5 MG/1
10 TABLET ORAL
Status: CANCELLED | OUTPATIENT
Start: 2024-01-05

## 2024-01-05 RX ORDER — ONDANSETRON 4 MG/1
4 TABLET, ORALLY DISINTEGRATING ORAL EVERY 30 MIN PRN
Status: CANCELLED | OUTPATIENT
Start: 2024-01-05

## 2024-01-05 RX ORDER — NALBUPHINE HYDROCHLORIDE 20 MG/ML
2.5-5 INJECTION, SOLUTION INTRAMUSCULAR; INTRAVENOUS; SUBCUTANEOUS EVERY 6 HOURS PRN
Status: CANCELLED | OUTPATIENT
Start: 2024-01-05

## 2024-01-05 RX ORDER — HYDRALAZINE HYDROCHLORIDE 20 MG/ML
2.5-5 INJECTION INTRAMUSCULAR; INTRAVENOUS EVERY 10 MIN PRN
Status: CANCELLED | OUTPATIENT
Start: 2024-01-05

## 2024-01-05 RX ORDER — METRONIDAZOLE 500 MG/1
500 TABLET ORAL 2 TIMES DAILY
Qty: 14 TABLET | Refills: 0 | Status: SHIPPED | OUTPATIENT
Start: 2024-01-05 | End: 2024-01-12

## 2024-01-05 RX ORDER — HEPARIN SODIUM (PORCINE) LOCK FLUSH IV SOLN 100 UNIT/ML 100 UNIT/ML
5 SOLUTION INTRAVENOUS
Status: CANCELLED | OUTPATIENT
Start: 2024-01-05

## 2024-01-05 ASSESSMENT — PAIN SCALES - GENERAL: PAINLEVEL: MODERATE PAIN (4)

## 2024-01-05 NOTE — ANESTHESIA PREPROCEDURE EVALUATION
"Anesthesia Pre-Procedure Evaluation    Patient: Myrtle Strickland   MRN: [id number] : 1995        Procedure : Procedure(s):  EXTERNAL CEPHALIC VERSION UNDER SPINAL          Past Medical History:   Diagnosis Date    ADHD (attention deficit hyperactivity disorder)     Anxiety     Anxiety disorder 10/15/2008    Asperger's disorder 2009    Attention deficit hyperactivity disorder (ADHD) 10/15/2008    Formatting of this note might be different from the original.  LW Onset:  90Qdb24    Benign joint hypermobility 2010    Bipolar disorder (H)      delivery delivered 2022    Chronic back pain 10/16/2023    Conversion disorder with seizures or convulsions 2019    Depressive disorder     Developmental expressive writing disorder 2009    Headache 2023    History of anemia 10/16/2023    History of asthma 2013    History of eating disorder     History of premature delivery 10/16/2023    07/01/19: PTD@34.4wks, PPROM, @ [facility],,  Q trimester both previous pregnancies at 34 wks, [facility] referral ordered    History of substance use     opiate    History of substance use disorder 10/16/2023    In remission. Hx of relapse after last delivery. Avoid narcotics in labor and delivery.     Hyperemesis gravidarum 10/17/2023    Major depressive disorder, recurrent episode, moderate (H) 2023    Migraine 2023    Mixed anxiety and depressive disorder 10/05/2022    started on sertraline 50mg at 8wks started on sertraline 50mg at 8wks    Orthostatic hypotension 2011    OTC (ornithine transcarbamylase deficiency) (H24)     family hx     delivery 2019    Psychogenic nonepileptic seizure     Rh negative state in antepartum period 10/16/2023    Needs rhogam at 28 weeks    Rubella non-immune status, antepartum     Seizures (H) 2019    no meds, last seizure 2022, had neuro referral with last pregnancy and was told it was \"anxiety induced\"- she wants to treat with " "sertraline only. no meds, last seizure 2022, referral sent to neuro, possible seizure 22, has neuro appt 22--feels anxiety induced and wants to treat with sertraline only. PER GUIDELINES - does not qualify for CNM care    Severe major depression without psychotic features (H) 2010    Syncope and collapse 2008    Formatting of this note might be different from the original. LW Modifier:  3/10/08 ; Syncope Formatting of this note might be different from the original. Formatting of this note might be different from the original. LW Modifier:  3/10/08 ; Syncope    Tobacco use disorder 2011    Formatting of this note might be different from the original. Tobacco Abuse Formatting of this note might be different from the original. Formatting of this note might be different from the original. Tobacco Abuse    Uncomplicated asthma     no inhaler use since high school    Vaginal bleeding before 22 weeks gestation 2023      Past Surgical History:   Procedure Laterality Date    AS REMOVAL OF TONSILS,<13 Y/O       SECTION N/A 2022    Procedure:  SECTION;  Surgeon: Gina Pascual MD;  Location: [facility]    MYRINGOTOMY, INSERT TUBE BILATERAL, COMBINED      MYRINGOTOMY, INSERT TUBE BILATERAL, COMBINED        Allergies   Allergen Reactions    Adhesive Tape Other (See Comments), Hives and Nausea     Burns only with waterproof bandages. [facility] Tegaderm and tape is okay.    Koehler only with waterproof bandages. [facility] Tegaderm and tape is okay.   \"burns\"    Dextromethorphan-Guaifenesin Hives    Guaifenesin Hives and Nausea and Vomiting    Latex Hives, Itching and Rash    Morphine And Related Hives    Mushroom Anaphylaxis    Mushroom Extract Complex Anaphylaxis and Unknown    Other (Do Not Use) Hives and Other (See Comments)     Burns only with waterproof bandages. [facility] Tegaderm and tape is okay.   \"burns\"      Social History     Tobacco Use    " "Smoking status: Former     Types: Cigarettes     Quit date: 3/1/2023     Years since quittin.8     Passive exposure: Yes    Smokeless tobacco: Former   Substance Use Topics    Alcohol use: Not Currently      Wt Readings from Last 1 Encounters:   23 70.3 kg (155 lb)        Anesthesia Evaluation   Pt has had prior anesthetic. Type: OB Labor Epidural and Regional.        ROS/MED HX  ENT/Pulmonary:       Neurologic: Comment: Psychogenic nonepileptic seizure      Cardiovascular:       METS/Exercise Tolerance:     Hematologic:       Musculoskeletal:       GI/Hepatic:       Renal/Genitourinary:       Endo: Comment: - Ornithine Transcarbamylase deficiency      Psychiatric/Substance Use: Comment: Hx Substance Use in remission     Autism Spectrum Disorder     (+) psychiatric history anxiety, bipolar and depression       Infectious Disease:       Malignancy:       Other:   -  labor  - History of  delivery at 34 weeks  - History of CS at 34 weeks for PPROM and breech presentation     (+)  , ,previous             OUTSIDE LABS:  CBC:   Lab Results   Component Value Date    WBC 9.7 2023    WBC 9.5 2023    HGB 10.4 (L) 2023    HGB 12.1 2023    HCT 32.2 (L) 2023    HCT 37.0 2023     2023     2023     BMP:   Lab Results   Component Value Date     (L) 2023     10/03/2022    POTASSIUM 4.5 2023    POTASSIUM 4.0 10/03/2022    CHLORIDE 106 2023    CHLORIDE 104 10/03/2022    CO2 22 2023    CO2 31 10/03/2022    BUN 7.1 2023    BUN 20 10/03/2022    CR 0.70 2023    CR 1.60 (H) 10/05/2022     (H) 2023     (H) 2023     COAGS: No results found for: \"PTT\", \"INR\", \"FIBR\"  POC: No results found for: \"BGM\", \"HCG\", \"HCGS\"  HEPATIC:   Lab Results   Component Value Date    ALBUMIN 3.4 (L) 2023    PROTTOTAL 6.4 2023    ALT 11 2023    AST 19 2023    ALKPHOS 218 (H) " 2023    BILITOTAL 0.3 2023    SHARRON 26 2023     OTHER:   Lab Results   Component Value Date    CONSUELO 8.9 2023       Anesthesia Plan    ASA Status:  3    NPO Status:  ELEVATED Aspiration Risk/Unknown    Anesthesia Type: CSE.   Induction: N/a.   Maintenance: N/A.        Consents    Anesthesia Plan(s) and associated risks, benefits, and realistic alternatives discussed. Questions answered and patient/representative(s) expressed understanding.     - Discussed: Risks, Benefits and Alternatives for BOTH SEDATION and the PROCEDURE were discussed     - Discussed with:  Patient       - Patient is DNR/DNI Status: No     Use of blood products discussed: Yes.     - Discussed with: Patient.     - Consented: consented to blood products     Postoperative Care    Pain management: Oral pain medications.   PONV prophylaxis: Ondansetron (or other 5HT-3)     Comments:    Other Comments: Myrtle Strickland is a 27 yo  at 37w2d by 6w4d  who presents today for scheduled ECV. 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 Plan CSE with standard ASA monitors. Has been consented for bilateral transversus abdominal plane block for post-op pain control if needed after  delivery. We discussed the risks and benefits of neuraxial analgesia and/or anesthesia including, but not limited to: spinal headache, infection, bleeding, damage to surrounding structures, failed or patchy epidural requiring replacement or conversion to general anesthesia in an emergent setting. Myrtle Strickland verbalized understanding of these risks. All questions were answered. She would like to proceed with the procedure.                Pete Tejeda MD    I have reviewed the pertinent notes and labs in the chart from the past 30 days and (re)examined the patient.  Any updates or changes from those notes are  reflected in this note.     # Hyponatremia: Lowest Na = 134 mmol/L in last 30 days, will monitor as appropriate      # Hypoalbuminemia: Lowest albumin = 3.4 g/dL in the past 30 days , will monitor as appropriate

## 2024-01-07 ENCOUNTER — HOSPITAL ENCOUNTER (OUTPATIENT)
Facility: CLINIC | Age: 29
Discharge: HOME | End: 2024-01-07
Attending: STUDENT IN AN ORGANIZED HEALTH CARE EDUCATION/TRAINING PROGRAM | Admitting: OBSTETRICS & GYNECOLOGY
Payer: COMMERCIAL

## 2024-01-07 VITALS
SYSTOLIC BLOOD PRESSURE: 122 MMHG | DIASTOLIC BLOOD PRESSURE: 83 MMHG | HEART RATE: 79 BPM | BODY MASS INDEX: 26.46 KG/M2 | TEMPERATURE: 98.5 F | WEIGHT: 155 LBS | HEIGHT: 64 IN | RESPIRATION RATE: 18 BRPM

## 2024-01-07 PROBLEM — Z36.89 ENCOUNTER FOR TRIAGE IN PREGNANT PATIENT: Status: ACTIVE | Noted: 2024-01-07

## 2024-01-07 PROCEDURE — 59025 FETAL NON-STRESS TEST: CPT

## 2024-01-07 PROCEDURE — G0463 HOSPITAL OUTPT CLINIC VISIT: HCPCS | Mod: 25

## 2024-01-07 RX ORDER — LIDOCAINE 40 MG/G
CREAM TOPICAL
Status: DISCONTINUED | OUTPATIENT
Start: 2024-01-07 | End: 2024-01-07 | Stop reason: HOSPADM

## 2024-01-07 ASSESSMENT — ACTIVITIES OF DAILY LIVING (ADL): ADLS_ACUITY_SCORE: 20

## 2024-01-08 ENCOUNTER — ANESTHESIA EVENT (OUTPATIENT)
Dept: OBGYN | Facility: CLINIC | Age: 29
End: 2024-01-08
Payer: COMMERCIAL

## 2024-01-08 ENCOUNTER — ANESTHESIA (OUTPATIENT)
Dept: OBGYN | Facility: CLINIC | Age: 29
End: 2024-01-08
Payer: COMMERCIAL

## 2024-01-08 ENCOUNTER — HOSPITAL ENCOUNTER (INPATIENT)
Facility: CLINIC | Age: 29
LOS: 2 days | Discharge: HOME HEALTH | End: 2024-01-11
Attending: STUDENT IN AN ORGANIZED HEALTH CARE EDUCATION/TRAINING PROGRAM | Admitting: STUDENT IN AN ORGANIZED HEALTH CARE EDUCATION/TRAINING PROGRAM
Payer: COMMERCIAL

## 2024-01-08 ENCOUNTER — PREP FOR PROCEDURE (OUTPATIENT)
Dept: OBGYN | Facility: CLINIC | Age: 29
End: 2024-01-08

## 2024-01-08 DIAGNOSIS — G89.18 ACUTE POST-OPERATIVE PAIN: ICD-10-CM

## 2024-01-08 DIAGNOSIS — Z98.891 S/P REPEAT LOW TRANSVERSE C-SECTION: Primary | ICD-10-CM

## 2024-01-08 LAB
ABO/RH(D): ABNORMAL
AMMONIA PLAS-SCNC: 22 UMOL/L (ref 11–51)
AMMONIA PLAS-SCNC: 22 UMOL/L (ref 11–51)
ANTIBODY ID: NORMAL
ANTIBODY SCREEN: POSITIVE
ERYTHROCYTE [DISTWIDTH] IN BLOOD BY AUTOMATED COUNT: 13.2 % (ref 10–15)
GLUCOSE BLDC GLUCOMTR-MCNC: 164 MG/DL (ref 70–99)
GLUCOSE BLDC GLUCOMTR-MCNC: 166 MG/DL (ref 70–99)
GLUCOSE BLDC GLUCOMTR-MCNC: 92 MG/DL (ref 70–99)
HCT VFR BLD AUTO: 36.6 % (ref 35–47)
HGB BLD-MCNC: 11.6 G/DL (ref 11.7–15.7)
HOLD SPECIMEN: NORMAL
HOLD SPECIMEN: NORMAL
MCH RBC QN AUTO: 26.2 PG (ref 26.5–33)
MCHC RBC AUTO-ENTMCNC: 31.7 G/DL (ref 31.5–36.5)
MCV RBC AUTO: 83 FL (ref 78–100)
PLATELET # BLD AUTO: 293 10E3/UL (ref 150–450)
RBC # BLD AUTO: 4.42 10E6/UL (ref 3.8–5.2)
SPECIMEN EXPIRATION DATE: ABNORMAL
SPECIMEN EXPIRATION DATE: NORMAL
WBC # BLD AUTO: 9.9 10E3/UL (ref 4–11)

## 2024-01-08 PROCEDURE — 272N000001 HC OR GENERAL SUPPLY STERILE: Performed by: STUDENT IN AN ORGANIZED HEALTH CARE EDUCATION/TRAINING PROGRAM

## 2024-01-08 PROCEDURE — 250N000011 HC RX IP 250 OP 636: Mod: JZ | Performed by: STUDENT IN AN ORGANIZED HEALTH CARE EDUCATION/TRAINING PROGRAM

## 2024-01-08 PROCEDURE — 258N000003 HC RX IP 258 OP 636: Performed by: STUDENT IN AN ORGANIZED HEALTH CARE EDUCATION/TRAINING PROGRAM

## 2024-01-08 PROCEDURE — 0UT70ZZ RESECTION OF BILATERAL FALLOPIAN TUBES, OPEN APPROACH: ICD-10-PCS | Performed by: STUDENT IN AN ORGANIZED HEALTH CARE EDUCATION/TRAINING PROGRAM

## 2024-01-08 PROCEDURE — 999N000141 HC STATISTIC PRE-PROCEDURE NURSING ASSESSMENT: Performed by: STUDENT IN AN ORGANIZED HEALTH CARE EDUCATION/TRAINING PROGRAM

## 2024-01-08 PROCEDURE — 250N000011 HC RX IP 250 OP 636: Performed by: STUDENT IN AN ORGANIZED HEALTH CARE EDUCATION/TRAINING PROGRAM

## 2024-01-08 PROCEDURE — 271N000001 HC OR GENERAL SUPPLY NON-STERILE: Performed by: STUDENT IN AN ORGANIZED HEALTH CARE EDUCATION/TRAINING PROGRAM

## 2024-01-08 PROCEDURE — 3E0R3BZ INTRODUCTION OF ANESTHETIC AGENT INTO SPINAL CANAL, PERCUTANEOUS APPROACH: ICD-10-PCS | Performed by: STUDENT IN AN ORGANIZED HEALTH CARE EDUCATION/TRAINING PROGRAM

## 2024-01-08 PROCEDURE — 36415 COLL VENOUS BLD VENIPUNCTURE: CPT

## 2024-01-08 PROCEDURE — 82140 ASSAY OF AMMONIA: CPT | Performed by: STUDENT IN AN ORGANIZED HEALTH CARE EDUCATION/TRAINING PROGRAM

## 2024-01-08 PROCEDURE — 86900 BLOOD TYPING SEROLOGIC ABO: CPT | Performed by: STUDENT IN AN ORGANIZED HEALTH CARE EDUCATION/TRAINING PROGRAM

## 2024-01-08 PROCEDURE — 85027 COMPLETE CBC AUTOMATED: CPT | Performed by: STUDENT IN AN ORGANIZED HEALTH CARE EDUCATION/TRAINING PROGRAM

## 2024-01-08 PROCEDURE — 710N000010 HC RECOVERY PHASE 1, LEVEL 2, PER MIN: Performed by: STUDENT IN AN ORGANIZED HEALTH CARE EDUCATION/TRAINING PROGRAM

## 2024-01-08 PROCEDURE — 82962 GLUCOSE BLOOD TEST: CPT

## 2024-01-08 PROCEDURE — 59514 CESAREAN DELIVERY ONLY: CPT | Mod: GC | Performed by: STUDENT IN AN ORGANIZED HEALTH CARE EDUCATION/TRAINING PROGRAM

## 2024-01-08 PROCEDURE — 360N000076 HC SURGERY LEVEL 3, PER MIN: Performed by: STUDENT IN AN ORGANIZED HEALTH CARE EDUCATION/TRAINING PROGRAM

## 2024-01-08 PROCEDURE — 250N000009 HC RX 250: Performed by: STUDENT IN AN ORGANIZED HEALTH CARE EDUCATION/TRAINING PROGRAM

## 2024-01-08 PROCEDURE — 58611 LIGATE OVIDUCT(S) ADD-ON: CPT | Mod: GC | Performed by: STUDENT IN AN ORGANIZED HEALTH CARE EDUCATION/TRAINING PROGRAM

## 2024-01-08 PROCEDURE — 370N000017 HC ANESTHESIA TECHNICAL FEE, PER MIN: Performed by: STUDENT IN AN ORGANIZED HEALTH CARE EDUCATION/TRAINING PROGRAM

## 2024-01-08 PROCEDURE — 88302 TISSUE EXAM BY PATHOLOGIST: CPT | Mod: 26 | Performed by: PATHOLOGY

## 2024-01-08 PROCEDURE — 250N000011 HC RX IP 250 OP 636: Performed by: NURSE ANESTHETIST, CERTIFIED REGISTERED

## 2024-01-08 PROCEDURE — 250N000011 HC RX IP 250 OP 636

## 2024-01-08 PROCEDURE — 258N000001 HC RX 258: Performed by: STUDENT IN AN ORGANIZED HEALTH CARE EDUCATION/TRAINING PROGRAM

## 2024-01-08 PROCEDURE — 59412 ANTEPARTUM MANIPULATION: CPT | Performed by: STUDENT IN AN ORGANIZED HEALTH CARE EDUCATION/TRAINING PROGRAM

## 2024-01-08 PROCEDURE — 00HU33Z INSERTION OF INFUSION DEVICE INTO SPINAL CANAL, PERCUTANEOUS APPROACH: ICD-10-PCS | Performed by: STUDENT IN AN ORGANIZED HEALTH CARE EDUCATION/TRAINING PROGRAM

## 2024-01-08 PROCEDURE — C9290 INJ, BUPIVACAINE LIPOSOME: HCPCS | Performed by: STUDENT IN AN ORGANIZED HEALTH CARE EDUCATION/TRAINING PROGRAM

## 2024-01-08 PROCEDURE — 88302 TISSUE EXAM BY PATHOLOGIST: CPT | Mod: TC

## 2024-01-08 PROCEDURE — 10S0XZZ REPOSITION PRODUCTS OF CONCEPTION, EXTERNAL APPROACH: ICD-10-PCS | Performed by: STUDENT IN AN ORGANIZED HEALTH CARE EDUCATION/TRAINING PROGRAM

## 2024-01-08 PROCEDURE — 250N000009 HC RX 250: Performed by: NURSE ANESTHETIST, CERTIFIED REGISTERED

## 2024-01-08 PROCEDURE — 86870 RBC ANTIBODY IDENTIFICATION: CPT | Performed by: STUDENT IN AN ORGANIZED HEALTH CARE EDUCATION/TRAINING PROGRAM

## 2024-01-08 PROCEDURE — 36415 COLL VENOUS BLD VENIPUNCTURE: CPT | Performed by: STUDENT IN AN ORGANIZED HEALTH CARE EDUCATION/TRAINING PROGRAM

## 2024-01-08 PROCEDURE — 82140 ASSAY OF AMMONIA: CPT

## 2024-01-08 PROCEDURE — 250N000013 HC RX MED GY IP 250 OP 250 PS 637: Performed by: STUDENT IN AN ORGANIZED HEALTH CARE EDUCATION/TRAINING PROGRAM

## 2024-01-08 PROCEDURE — 258N000003 HC RX IP 258 OP 636: Performed by: NURSE ANESTHETIST, CERTIFIED REGISTERED

## 2024-01-08 RX ORDER — NALOXONE HYDROCHLORIDE 0.4 MG/ML
0.4 INJECTION, SOLUTION INTRAMUSCULAR; INTRAVENOUS; SUBCUTANEOUS
Status: DISCONTINUED | OUTPATIENT
Start: 2024-01-08 | End: 2024-01-09

## 2024-01-08 RX ORDER — BUPIVACAINE HYDROCHLORIDE 2.5 MG/ML
INJECTION, SOLUTION EPIDURAL; INFILTRATION; INTRACAUDAL
Status: COMPLETED | OUTPATIENT
Start: 2024-01-08 | End: 2024-01-08

## 2024-01-08 RX ORDER — EPHEDRINE SULFATE 50 MG/ML
INJECTION, SOLUTION INTRAMUSCULAR; INTRAVENOUS; SUBCUTANEOUS PRN
Status: DISCONTINUED | OUTPATIENT
Start: 2024-01-08 | End: 2024-01-08

## 2024-01-08 RX ORDER — TERBUTALINE SULFATE 1 MG/ML
INJECTION, SOLUTION SUBCUTANEOUS
Status: COMPLETED
Start: 2024-01-08 | End: 2024-01-08

## 2024-01-08 RX ORDER — OXYTOCIN 10 [USP'U]/ML
10 INJECTION, SOLUTION INTRAMUSCULAR; INTRAVENOUS
Status: CANCELLED | OUTPATIENT
Start: 2024-01-08

## 2024-01-08 RX ORDER — LIDOCAINE HYDROCHLORIDE AND EPINEPHRINE 15; 5 MG/ML; UG/ML
INJECTION, SOLUTION EPIDURAL PRN
Status: DISCONTINUED | OUTPATIENT
Start: 2024-01-08 | End: 2024-01-08

## 2024-01-08 RX ORDER — NALOXONE HYDROCHLORIDE 0.4 MG/ML
0.2 INJECTION, SOLUTION INTRAMUSCULAR; INTRAVENOUS; SUBCUTANEOUS
Status: DISCONTINUED | OUTPATIENT
Start: 2024-01-08 | End: 2024-01-09

## 2024-01-08 RX ORDER — MISOPROSTOL 200 UG/1
800 TABLET ORAL
Status: DISCONTINUED | OUTPATIENT
Start: 2024-01-08 | End: 2024-01-09 | Stop reason: HOSPADM

## 2024-01-08 RX ORDER — LABETALOL HYDROCHLORIDE 5 MG/ML
10 INJECTION, SOLUTION INTRAVENOUS
Status: DISCONTINUED | OUTPATIENT
Start: 2024-01-08 | End: 2024-01-09 | Stop reason: HOSPADM

## 2024-01-08 RX ORDER — METHYLERGONOVINE MALEATE 0.2 MG/ML
200 INJECTION INTRAVENOUS
Status: CANCELLED | OUTPATIENT
Start: 2024-01-08

## 2024-01-08 RX ORDER — CITRIC ACID/SODIUM CITRATE 334-500MG
30 SOLUTION, ORAL ORAL
Status: COMPLETED | OUTPATIENT
Start: 2024-01-08 | End: 2024-01-08

## 2024-01-08 RX ORDER — LIDOCAINE HCL/EPINEPHRINE/PF 2%-1:200K
VIAL (ML) INJECTION PRN
Status: DISCONTINUED | OUTPATIENT
Start: 2024-01-08 | End: 2024-01-08

## 2024-01-08 RX ORDER — TRANEXAMIC ACID 10 MG/ML
1 INJECTION, SOLUTION INTRAVENOUS EVERY 30 MIN PRN
Status: DISCONTINUED | OUTPATIENT
Start: 2024-01-08 | End: 2024-01-09 | Stop reason: HOSPADM

## 2024-01-08 RX ORDER — FENTANYL CITRATE 50 UG/ML
INJECTION, SOLUTION INTRAMUSCULAR; INTRAVENOUS PRN
Status: DISCONTINUED | OUTPATIENT
Start: 2024-01-08 | End: 2024-01-08

## 2024-01-08 RX ORDER — ONDANSETRON 4 MG/1
4 TABLET, ORALLY DISINTEGRATING ORAL EVERY 30 MIN PRN
Status: DISCONTINUED | OUTPATIENT
Start: 2024-01-08 | End: 2024-01-09

## 2024-01-08 RX ORDER — MISOPROSTOL 200 UG/1
800 TABLET ORAL
Status: CANCELLED | OUTPATIENT
Start: 2024-01-08

## 2024-01-08 RX ORDER — LIDOCAINE 40 MG/G
CREAM TOPICAL
Status: CANCELLED | OUTPATIENT
Start: 2024-01-08

## 2024-01-08 RX ORDER — DEXAMETHASONE SODIUM PHOSPHATE 4 MG/ML
INJECTION, SOLUTION INTRA-ARTICULAR; INTRALESIONAL; INTRAMUSCULAR; INTRAVENOUS; SOFT TISSUE PRN
Status: DISCONTINUED | OUTPATIENT
Start: 2024-01-08 | End: 2024-01-08

## 2024-01-08 RX ORDER — METOCLOPRAMIDE HYDROCHLORIDE 5 MG/ML
10 INJECTION INTRAMUSCULAR; INTRAVENOUS EVERY 6 HOURS
Status: DISCONTINUED | OUTPATIENT
Start: 2024-01-08 | End: 2024-01-09

## 2024-01-08 RX ORDER — HYDRALAZINE HYDROCHLORIDE 20 MG/ML
2.5-5 INJECTION INTRAMUSCULAR; INTRAVENOUS EVERY 10 MIN PRN
Status: DISCONTINUED | OUTPATIENT
Start: 2024-01-08 | End: 2024-01-09 | Stop reason: HOSPADM

## 2024-01-08 RX ORDER — METHYLERGONOVINE MALEATE 0.2 MG/ML
200 INJECTION INTRAVENOUS
Status: DISCONTINUED | OUTPATIENT
Start: 2024-01-08 | End: 2024-01-09 | Stop reason: HOSPADM

## 2024-01-08 RX ORDER — OXYTOCIN/0.9 % SODIUM CHLORIDE 30/500 ML
340 PLASTIC BAG, INJECTION (ML) INTRAVENOUS CONTINUOUS PRN
Status: COMPLETED | OUTPATIENT
Start: 2024-01-08 | End: 2024-01-08

## 2024-01-08 RX ORDER — OXYTOCIN/0.9 % SODIUM CHLORIDE 30/500 ML
100-340 PLASTIC BAG, INJECTION (ML) INTRAVENOUS CONTINUOUS PRN
Status: CANCELLED | OUTPATIENT
Start: 2024-01-08

## 2024-01-08 RX ORDER — ACETAMINOPHEN 325 MG/1
975 TABLET ORAL ONCE
Status: COMPLETED | OUTPATIENT
Start: 2024-01-08 | End: 2024-01-08

## 2024-01-08 RX ORDER — ONDANSETRON 4 MG/1
4 TABLET, ORALLY DISINTEGRATING ORAL EVERY 30 MIN PRN
Status: DISCONTINUED | OUTPATIENT
Start: 2024-01-08 | End: 2024-01-09 | Stop reason: HOSPADM

## 2024-01-08 RX ORDER — ACETAMINOPHEN 325 MG/1
975 TABLET ORAL ONCE
Status: CANCELLED | OUTPATIENT
Start: 2024-01-08 | End: 2024-01-08

## 2024-01-08 RX ORDER — FENTANYL CITRATE 50 UG/ML
INJECTION, SOLUTION INTRAMUSCULAR; INTRAVENOUS
Status: COMPLETED | OUTPATIENT
Start: 2024-01-08 | End: 2024-01-08

## 2024-01-08 RX ORDER — OXYCODONE HYDROCHLORIDE 5 MG/1
10 TABLET ORAL
Status: DISCONTINUED | OUTPATIENT
Start: 2024-01-08 | End: 2024-01-09

## 2024-01-08 RX ORDER — SODIUM CHLORIDE, SODIUM LACTATE, POTASSIUM CHLORIDE, CALCIUM CHLORIDE 600; 310; 30; 20 MG/100ML; MG/100ML; MG/100ML; MG/100ML
INJECTION, SOLUTION INTRAVENOUS CONTINUOUS PRN
Status: DISCONTINUED | OUTPATIENT
Start: 2024-01-08 | End: 2024-01-08

## 2024-01-08 RX ORDER — SODIUM CHLORIDE, SODIUM LACTATE, POTASSIUM CHLORIDE, CALCIUM CHLORIDE 600; 310; 30; 20 MG/100ML; MG/100ML; MG/100ML; MG/100ML
INJECTION, SOLUTION INTRAVENOUS CONTINUOUS
Status: DISCONTINUED | OUTPATIENT
Start: 2024-01-08 | End: 2024-01-09 | Stop reason: HOSPADM

## 2024-01-08 RX ORDER — CITRIC ACID/SODIUM CITRATE 334-500MG
30 SOLUTION, ORAL ORAL
Status: CANCELLED | OUTPATIENT
Start: 2024-01-08

## 2024-01-08 RX ORDER — DIPHENHYDRAMINE HCL 25 MG
25 CAPSULE ORAL EVERY 6 HOURS PRN
Status: DISCONTINUED | OUTPATIENT
Start: 2024-01-08 | End: 2024-01-11 | Stop reason: HOSPADM

## 2024-01-08 RX ORDER — ONDANSETRON 2 MG/ML
4 INJECTION INTRAMUSCULAR; INTRAVENOUS EVERY 30 MIN PRN
Status: DISCONTINUED | OUTPATIENT
Start: 2024-01-08 | End: 2024-01-09

## 2024-01-08 RX ORDER — HYDROXYZINE HYDROCHLORIDE 50 MG/1
50 TABLET, FILM COATED ORAL EVERY 6 HOURS PRN
Status: DISCONTINUED | OUTPATIENT
Start: 2024-01-08 | End: 2024-01-11 | Stop reason: HOSPADM

## 2024-01-08 RX ORDER — HYDROXYZINE HYDROCHLORIDE 25 MG/1
25 TABLET, FILM COATED ORAL EVERY 6 HOURS PRN
Status: DISCONTINUED | OUTPATIENT
Start: 2024-01-08 | End: 2024-01-11 | Stop reason: HOSPADM

## 2024-01-08 RX ORDER — FENTANYL CITRATE-0.9 % NACL/PF 10 MCG/ML
100 PLASTIC BAG, INJECTION (ML) INTRAVENOUS EVERY 5 MIN PRN
Status: DISCONTINUED | OUTPATIENT
Start: 2024-01-08 | End: 2024-01-09 | Stop reason: HOSPADM

## 2024-01-08 RX ORDER — MISOPROSTOL 200 UG/1
400 TABLET ORAL
Status: DISCONTINUED | OUTPATIENT
Start: 2024-01-08 | End: 2024-01-09 | Stop reason: HOSPADM

## 2024-01-08 RX ORDER — CEFAZOLIN SODIUM/WATER 2 G/20 ML
2 SYRINGE (ML) INTRAVENOUS
Status: COMPLETED | OUTPATIENT
Start: 2024-01-08 | End: 2024-01-08

## 2024-01-08 RX ORDER — CARBOPROST TROMETHAMINE 250 UG/ML
250 INJECTION, SOLUTION INTRAMUSCULAR
Status: CANCELLED | OUTPATIENT
Start: 2024-01-08

## 2024-01-08 RX ORDER — DEXTROSE MONOHYDRATE 100 MG/ML
INJECTION, SOLUTION INTRAVENOUS CONTINUOUS
Status: DISCONTINUED | OUTPATIENT
Start: 2024-01-08 | End: 2024-01-09

## 2024-01-08 RX ORDER — ONDANSETRON 2 MG/ML
4 INJECTION INTRAMUSCULAR; INTRAVENOUS EVERY 30 MIN PRN
Status: DISCONTINUED | OUTPATIENT
Start: 2024-01-08 | End: 2024-01-09 | Stop reason: HOSPADM

## 2024-01-08 RX ORDER — ONDANSETRON 2 MG/ML
INJECTION INTRAMUSCULAR; INTRAVENOUS PRN
Status: DISCONTINUED | OUTPATIENT
Start: 2024-01-08 | End: 2024-01-08

## 2024-01-08 RX ORDER — NALBUPHINE HYDROCHLORIDE 20 MG/ML
2.5-5 INJECTION, SOLUTION INTRAMUSCULAR; INTRAVENOUS; SUBCUTANEOUS EVERY 6 HOURS PRN
Status: DISCONTINUED | OUTPATIENT
Start: 2024-01-08 | End: 2024-01-09

## 2024-01-08 RX ORDER — OXYTOCIN/0.9 % SODIUM CHLORIDE 30/500 ML
340 PLASTIC BAG, INJECTION (ML) INTRAVENOUS CONTINUOUS PRN
Status: CANCELLED | OUTPATIENT
Start: 2024-01-08

## 2024-01-08 RX ORDER — SODIUM CHLORIDE, SODIUM LACTATE, POTASSIUM CHLORIDE, CALCIUM CHLORIDE 600; 310; 30; 20 MG/100ML; MG/100ML; MG/100ML; MG/100ML
INJECTION, SOLUTION INTRAVENOUS CONTINUOUS
Status: CANCELLED | OUTPATIENT
Start: 2024-01-08

## 2024-01-08 RX ORDER — MISOPROSTOL 100 UG/1
400 TABLET ORAL
Status: CANCELLED | OUTPATIENT
Start: 2024-01-08

## 2024-01-08 RX ORDER — OXYTOCIN 10 [USP'U]/ML
10 INJECTION, SOLUTION INTRAMUSCULAR; INTRAVENOUS
Status: DISCONTINUED | OUTPATIENT
Start: 2024-01-08 | End: 2024-01-09 | Stop reason: HOSPADM

## 2024-01-08 RX ORDER — CEFAZOLIN SODIUM/WATER 2 G/20 ML
2 SYRINGE (ML) INTRAVENOUS SEE ADMIN INSTRUCTIONS
Status: DISCONTINUED | OUTPATIENT
Start: 2024-01-08 | End: 2024-01-09 | Stop reason: HOSPADM

## 2024-01-08 RX ORDER — OXYCODONE HYDROCHLORIDE 5 MG/1
5 TABLET ORAL
Status: DISCONTINUED | OUTPATIENT
Start: 2024-01-08 | End: 2024-01-09

## 2024-01-08 RX ORDER — CARBOPROST TROMETHAMINE 250 UG/ML
250 INJECTION, SOLUTION INTRAMUSCULAR
Status: DISCONTINUED | OUTPATIENT
Start: 2024-01-08 | End: 2024-01-09 | Stop reason: HOSPADM

## 2024-01-08 RX ADMIN — EPHEDRINE SULFATE 5 MG: 5 INJECTION INTRAVENOUS at 15:38

## 2024-01-08 RX ADMIN — DEXAMETHASONE SODIUM PHOSPHATE 8 MG: 4 INJECTION, SOLUTION INTRA-ARTICULAR; INTRALESIONAL; INTRAMUSCULAR; INTRAVENOUS; SOFT TISSUE at 21:38

## 2024-01-08 RX ADMIN — BUPIVACAINE HYDROCHLORIDE 1 ML: 2.5 INJECTION, SOLUTION EPIDURAL; INFILTRATION; INTRACAUDAL at 15:25

## 2024-01-08 RX ADMIN — PHENYLEPHRINE HYDROCHLORIDE 200 MCG: 10 INJECTION INTRAVENOUS at 21:14

## 2024-01-08 RX ADMIN — SODIUM CITRATE AND CITRIC ACID MONOHYDRATE 30 ML: 500; 334 SOLUTION ORAL at 20:43

## 2024-01-08 RX ADMIN — LIDOCAINE HYDROCHLORIDE,EPINEPHRINE BITARTRATE 5 ML: 20; .005 INJECTION, SOLUTION EPIDURAL; INFILTRATION; INTRACAUDAL; PERINEURAL at 21:09

## 2024-01-08 RX ADMIN — DIPHENHYDRAMINE HYDROCHLORIDE 25 MG: 25 CAPSULE ORAL at 17:42

## 2024-01-08 RX ADMIN — Medication 2 G: at 21:10

## 2024-01-08 RX ADMIN — PHENYLEPHRINE HYDROCHLORIDE 150 MCG: 10 INJECTION INTRAVENOUS at 21:46

## 2024-01-08 RX ADMIN — BUPIVACAINE HYDROCHLORIDE 20 ML: 2.5 INJECTION, SOLUTION EPIDURAL; INFILTRATION; INTRACAUDAL at 22:20

## 2024-01-08 RX ADMIN — PHENYLEPHRINE HYDROCHLORIDE 100 MCG/MIN: 10 INJECTION INTRAVENOUS at 15:30

## 2024-01-08 RX ADMIN — DEXTROSE MONOHYDRATE: 100 INJECTION, SOLUTION INTRAVENOUS at 22:05

## 2024-01-08 RX ADMIN — FENTANYL CITRATE 100 MCG: 50 INJECTION INTRAMUSCULAR; INTRAVENOUS at 20:50

## 2024-01-08 RX ADMIN — SODIUM CHLORIDE, POTASSIUM CHLORIDE, SODIUM LACTATE AND CALCIUM CHLORIDE: 600; 310; 30; 20 INJECTION, SOLUTION INTRAVENOUS at 15:16

## 2024-01-08 RX ADMIN — SODIUM CHLORIDE, POTASSIUM CHLORIDE, SODIUM LACTATE AND CALCIUM CHLORIDE 500 ML: 600; 310; 30; 20 INJECTION, SOLUTION INTRAVENOUS at 20:41

## 2024-01-08 RX ADMIN — LIDOCAINE HYDROCHLORIDE,EPINEPHRINE BITARTRATE 5 ML: 20; .005 INJECTION, SOLUTION EPIDURAL; INFILTRATION; INTRACAUDAL; PERINEURAL at 21:05

## 2024-01-08 RX ADMIN — FENTANYL CITRATE 15 MCG: 50 INJECTION INTRAMUSCULAR; INTRAVENOUS at 15:25

## 2024-01-08 RX ADMIN — PHENYLEPHRINE HYDROCHLORIDE 100 MCG: 10 INJECTION INTRAVENOUS at 15:32

## 2024-01-08 RX ADMIN — SODIUM CHLORIDE, POTASSIUM CHLORIDE, SODIUM LACTATE AND CALCIUM CHLORIDE: 600; 310; 30; 20 INJECTION, SOLUTION INTRAVENOUS at 22:16

## 2024-01-08 RX ADMIN — LIDOCAINE HYDROCHLORIDE,EPINEPHRINE BITARTRATE 3 ML: 20; .005 INJECTION, SOLUTION EPIDURAL; INFILTRATION; INTRACAUDAL; PERINEURAL at 21:50

## 2024-01-08 RX ADMIN — TERBUTALINE SULFATE 1 MG: 1 INJECTION, SOLUTION SUBCUTANEOUS at 15:28

## 2024-01-08 RX ADMIN — PHENYLEPHRINE HYDROCHLORIDE 150 MCG: 10 INJECTION INTRAVENOUS at 22:10

## 2024-01-08 RX ADMIN — SODIUM CHLORIDE, POTASSIUM CHLORIDE, SODIUM LACTATE AND CALCIUM CHLORIDE: 600; 310; 30; 20 INJECTION, SOLUTION INTRAVENOUS at 21:04

## 2024-01-08 RX ADMIN — HUMAN RHO(D) IMMUNE GLOBULIN 300 MCG: 1500 SOLUTION INTRAMUSCULAR; INTRAVENOUS at 17:31

## 2024-01-08 RX ADMIN — Medication 600 ML/HR: at 21:36

## 2024-01-08 RX ADMIN — DEXTROSE MONOHYDRATE 500 ML: 100 INJECTION, SOLUTION INTRAVENOUS at 11:18

## 2024-01-08 RX ADMIN — FAMOTIDINE 20 MG: 10 INJECTION, SOLUTION INTRAVENOUS at 20:55

## 2024-01-08 RX ADMIN — ONDANSETRON 4 MG: 2 INJECTION INTRAMUSCULAR; INTRAVENOUS at 15:30

## 2024-01-08 RX ADMIN — PHENYLEPHRINE HYDROCHLORIDE 150 MCG: 10 INJECTION INTRAVENOUS at 21:24

## 2024-01-08 RX ADMIN — ONDANSETRON 4 MG: 2 INJECTION INTRAMUSCULAR; INTRAVENOUS at 21:13

## 2024-01-08 RX ADMIN — ACETAMINOPHEN 975 MG: 325 TABLET, FILM COATED ORAL at 20:43

## 2024-01-08 RX ADMIN — EPHEDRINE SULFATE 5 MG: 5 INJECTION INTRAVENOUS at 22:16

## 2024-01-08 RX ADMIN — BUPIVACAINE 20 ML: 13.3 INJECTION, SUSPENSION, LIPOSOMAL INFILTRATION at 22:20

## 2024-01-08 RX ADMIN — LIDOCAINE HYDROCHLORIDE,EPINEPHRINE BITARTRATE 3 ML: 15; .005 INJECTION, SOLUTION EPIDURAL; INFILTRATION; INTRACAUDAL; PERINEURAL at 20:14

## 2024-01-08 RX ADMIN — PHENYLEPHRINE HYDROCHLORIDE 100 MCG: 10 INJECTION INTRAVENOUS at 21:11

## 2024-01-08 RX ADMIN — HYDROXYZINE HYDROCHLORIDE 25 MG: 25 TABLET, FILM COATED ORAL at 14:54

## 2024-01-08 RX ADMIN — LIDOCAINE HYDROCHLORIDE,EPINEPHRINE BITARTRATE 5 ML: 20; .005 INJECTION, SOLUTION EPIDURAL; INFILTRATION; INTRACAUDAL; PERINEURAL at 20:53

## 2024-01-08 RX ADMIN — PHENYLEPHRINE HYDROCHLORIDE 50 MCG/MIN: 10 INJECTION INTRAVENOUS at 21:14

## 2024-01-08 ASSESSMENT — ACTIVITIES OF DAILY LIVING (ADL)
ADLS_ACUITY_SCORE: 18

## 2024-01-08 ASSESSMENT — LIFESTYLE VARIABLES: TOBACCO_USE: 1

## 2024-01-08 NOTE — PLAN OF CARE
[facility] with perceived decreased fetal movement. Pt placed on monitor upon arrival. Baby has moderate variability with accelerations present. Pt is scheduled for a version tomorrow. Bedside ultrasound requested since baby was found lower in abdomen with monitor. Pt is also [place] every 3 minutes. She was recently diagnosed with bacterial vaginosis but has not yet picked up her prescription. Pt states the contractions are not painful, but she is aware of them. MD notified of arrival.

## 2024-01-08 NOTE — ANESTHESIA PROCEDURE NOTES
"Combined intrathecal/epidural Procedure Note    Pre-Procedure   Staff -        Anesthesiologist:  Germaine Doty MD       Resident/Fellow: Pete Tejeda MD       Performed By: resident       Location: OR       Pre-Anesthestic Checklist: patient identified, IV checked, risks and benefits discussed, informed consent, monitors and equipment checked, pre-op evaluation, at physician/surgeon's request and post-op pain management  Timeout:       Correct Patient: Yes        Correct Procedure: Yes        Correct Site: Yes        Correct Position: Yes   Procedure Documentation  Procedure: combined intrathecal/epidural       Diagnosis: Surgical Block for ECV       Patient Position: sitting       Skin prep: Chloraprep       Local skin infiltrated with 2 mL of 1% lidocaine.        Insertion Site: L3-4. (midline approach).       Technique: LORT saline        BRYON at 5 cm.       Needle Type: ToSmartestingy       Needle Gauge: 17.        Needle Length (Inches): 3.5        Spinal Needle Type: Pencan       Spinal Needle (gauge): 25        Spinal Needle Length (inches): 5       Catheter: 19 G.          Catheter threaded easily.         4.5 cm epidural space.         Threaded 9.5 cm at skin.         # of attempts: 1 and  # of redirects:  0    Assessment/Narrative         Paresthesias: No.       Insertion/Infusion Method: LORT saline       No aspiration negative for Heme or CSF via Epidural Catheter.       Sensory Level Left: T6.       Sensory Level Right: T6.       CSF fluid: with Spinal needle.CSF fluid removed: with Epidural needle - not with Epidural needle.    Medication(s) Administered   0.25% Bupivacaine PF (Intrathecal) - Intrathecal   1 mL - 1/8/2024 3:25:00 PM  Fentanyl PF (Intrathecal) - Intrathecal   15 mcg - 1/8/2024 3:25:00 PM    FOR [facility] ([facility] [place] ONLY:   Pain Team Contact information: please page the Pain Team Via [employer]. Search \"Pain\". During daytime hours, please page the attending first. At night please page the "  first.

## 2024-01-08 NOTE — PROCEDURES
Procedure:  The surgical team was alerted of the procedure; team and operating room were available.  The patient emptied her bladder and was dressed in a hospital gown.  The procedure, alternatives, and risks were discussed with the patient. Alternatives included expectant management and scheduled  delivery.  Risks discussed included but were not limited to patient discomfort, placental abruption, fetal intolerance of procedure requiring emergent or urgent delivery.  Questions were answered and a consent form was signed. The patient wished to proceed with external cephalic version.    The patient was taken to the operating room where adequate combined spinal epidural anesthesia was obtained.   Bedside ultrasound was performed.  Fetal position was breech, head midline.  Placenta was posterior.  BILLY was grossly normal.  Cardiac activity and fetal movement were visible.    An IV was in place.  Terbutaline was administered subcutaneously.  The external cephalic version was made in 1 attempts by elevating the breech and applying downward pressure at the posterior aspect of the fetal vertex.  Between attempts, fetal heart rate was in the 70s for several minutes but then did return to 130 baseline.  These attempts were not successful in achieving version.  The fetus was monitored for 1-2 hours following the procedure with a reactive and reassuring but had worsening contractions and cervical dilation to 4 cm following the procedure so decision to proceed with repeat C/s and bilateral salpingectomy made.   Rh negative status; Rhogam given.    Gabi Adler MD,  24 8:27 PM

## 2024-01-08 NOTE — DISCHARGE INSTRUCTIONS
Discharge Instructions for Undelivered Patients    Diet:  * Drink 8 to 12 glasses of liquids (milk, juice, water) every day  * You may eat meals and snacks.    Activity:  * * Count fetal kicks every day (see handout).  * Call your doctor or nurse midwife if your baby is moving less than usual.    Call your provider if you notice:  * Swelling in your face or increased swelling in your hands or legs.  * Headaches that are not relieved by Tylenol (acetaminophen).  * Changes in your vision (blurring; seeing spots or stars).  * Nausea (sick to your stomach) and vomiting (throwing up).  * Weight gain of 5 pounds per week.  * Heartburn that doesn't go away.  * Signs of bladder infection: Pain when you urinate (use the toilet), needing to go more often or more urgently.  * The bag of dominguez (membrane) breaks, or you notice leaking in your underwear.  * Bright red blood in your underwear.  * Abdominal (lower belly) or stomach pain.  * For first baby: Contractions (tightenings) less than 5 minutes apart for one hour or more  * Second (plus) baby: Contractions (tightenings) less than 10 minutes apart and getting stronger.  * Increase or change in vaginal discharge (note the color and amount).

## 2024-01-08 NOTE — ANESTHESIA CARE TRANSFER NOTE
Patient: Myrtle Strickland    Procedure: Procedure(s):  EXTERNAL CEPHALIC VERSION UNDER SPINAL   section       Diagnosis: Breech presentation, single or unspecified fetus [O32.1XX0]  Diagnosis Additional Information: No value filed.    Anesthesia Type:   Spinal     Note:    Oropharynx: oropharynx clear of all foreign objects and spontaneously breathing  Level of Consciousness: awake  Oxygen Supplementation: room air    Independent Airway: airway patency satisfactory and stable  Dentition: dentition unchanged  Vital Signs Stable: post-procedure vital signs reviewed and stable  Report to RN Given: handoff report given  Patient transferred to: PACU  Comments: .Anesthesia Care Transfer Note    Patient: Myrtle Strickland    Transferred to: PACU    Patient vital signs: stable    Airway: none    Monitors applied, VSS.  Patient awake and comfortable, breathing spontaneously.  Report given to RN with transfer of care.        Kesha Ferris CRNA  2024  4:04 PM      Handoff Report: Identifed the Patient, Identified the Reponsible Provider, Reviewed the pertinent medical history, Discussed the surgical course, Reviewed Intra-OP anesthesia mangement and issues during anesthesia, Set expectations for post-procedure period and Allowed opportunity for questions and acknowledgement of understanding    Vitals:  Vitals Value Taken Time   /80 24 1600   Temp     Pulse 123 24 1602   Resp 19 24 1602   SpO2 100 % 24 1602   Vitals shown include unfiled device data.    Electronically Signed By: JACOB Palomino CRNA  2024  4:04 PM

## 2024-01-08 NOTE — PROGRESS NOTES
"[facility]  OB Triage Note    CC: DFM    HPI:   Myrtle Strickland is a 28 year old  at 37w1d , who presents with the above.     She has some contractions after dx of BV on Friday (did not yet  abx). No leaking fluid, vaginal bleeding. Endorses fetal movement changes as above.     Pregnancy notable for:  Hx C/S x1 d/t PPROM @ 34w & breech   Dx OTC see MFM consult   Hx PTB x2  Hx MACK in remission.  Seizure disorder hx  Rubella NI, Varicella and Hep B NI defers vax    ROS: Negative except as noted above in HPI    O:  Patient Vitals for the past 24 hrs:   BP Temp Temp src Pulse Resp Height Weight   24 1819 122/83 98.5  F (36.9  C) Oral 79 18 1.626 m (5' 4\") 70.3 kg (155 lb)       Gen: Well-appearing, NAD  CV: Regular rate, well perfused  Pulm: Non-labored breathing on room air  Abd: Gravid, soft, non-tender  Ext: No LE edema bilaterally    SVE: 1/L/H per G3, chaperoned by RN    FHT: Baseline 135, mod variability, accelerations present, no decelerations  [place]: Uterine irritability, up to 3 ctx in 10 mins    BSUS: Breech (head in maternal RUQ), MVP 3x6 cm    Labs:   No results found for this or any previous visit (from the past 24 hour(s)).         A/P:  Ms. Myrtle Strickland is a 28 year old  at 37w1d here with decreased FM; reactive NST and normal fluid on BSUS. Also noted some ctx, improved w/ PO hydration and SVE reassuring that she is not in labor. NST reactive. Patient to return to L&D for ECV in AM. Also advised to start Flagyl previously prescribed for BV.    Discussed with Dr. Timoteo Gerardo MD, PGY-3  9:56 AM  [facility] Obstetrics & Gynecology Residency    Note entry delayed due to urgent patient care tasks. Patient evaluated on above DOS.      I personally examined and evaluated Myrtle Strickland on 2024.  I discussed the patient with Dr. Gerardo and agree with the presentation, exam and plan of care documented in this note with edits by me.   Katja Mahmood MD " MPH

## 2024-01-08 NOTE — H&P
OB HISTORY AND PHYSICAL    Patient: Myrtle Strickland   MRN#: [id number]  YOB: 1995     HPI: Myrtle Strickland is a 28 year old  at 37w2d by 6w4d US who presents today for scheduled ECV.    She reports good fetal movement. Continues to have frequent contractions, these feel a bit more intense but have been present for the last few months. Denies LOF, vaginal bleeding, or other concerns today. Feeling very anxious about spinal and PIV.     Pregnancy notable for:    - H/o CS x1   - MACK in remission  - Rh negative status  - Rubella NI  - Varicella NI  - Hep B NI  - MDD/SARAH  - Ornithine Transcarbamylase deficiency  - Anxiety, depression, ADHD, bipolar disorder, and autism spectrum disorder  - Non epileptic seizure disorder  - Hx eating disorder  -  contractions (admit -)    Her previous pregnancies were notable for PTB. CS x1 due to PPROM w/ breech fetal presentation. Desires TOLAC if ECV successful. Previously counseled regarding risks, signed consent. Denies history of postpartum hemorrhage, shoulder dystocia, pre-eclampsia. No history of asthma or high blood pressure.    Patient has been NPO since last night    Prenatal Lab Results:  Lab Results   Component Value Date    HCT 32.2 2023    HGB 10.4 2023    RPR Nonreactive 2023       Patient Active Problem List    Diagnosis Date Noted    Encounter for triage in pregnant patient 2024     Priority: Medium    Abnormal vaginal fluids 2024     Priority: Medium    Iron deficiency anemia secondary to inadequate dietary iron intake 2024     Priority: Medium    Bacterial vaginosis in pregnancy 2024     Priority: Medium    Breech presentation 2023     Priority: Medium    Vitamin D deficiency 2023     Priority: Medium     11/3/23- Vit D 13- start supplement___      Maternal varicella, non-immune 10/24/2023     Priority: Medium    Nonimmune to hepatitis B virus 10/24/2023     Priority: Medium    High-risk  pregnancy, unspecified trimester 10/20/2023     Priority: Medium     [facility] Patient Provider Group choice: MD group  Partner's name:   AYLA of care at:  []NOB folder  []Dating  []Fetal anatomy US ordered  [x]Rubella NONimmune  [x]Hep B NONimmune   [x]Varicella immune  []Pap  [x]Yes, plan utox, discussed w patient  []COVID vaccine completed  _____________________________________  []EOB folder  [x]PP Contraception plan: If tubal,consent date: 23  [x]Labor plans:  [x]Infant feeding plan: breast  []FLU shot  [x]TDAP given  []RSV  [x]Rhogam if needed, date:  [x]TOLAC consent done 11/3  [x]GCT, passed  ________________________________________  [] OTC PP meds sent  []PP plans, time off, support system discussed, resources offered  []Planning CS-ERAS pkt        History of  delivery 10/20/2023     Priority: Medium    ADHD (attention deficit hyperactivity disorder) 10/17/2023     Priority: Medium    History of eating disorder 10/17/2023     Priority: Medium    Bipolar disorder (H) 10/17/2023     Priority: Medium    Hyperemesis gravidarum 10/17/2023     Priority: Medium    Uncomplicated asthma 10/16/2023     Priority: Medium     no inhaler use since high school      Chronic back pain 10/16/2023     Priority: Medium    History of anemia 10/16/2023     Priority: Medium    History of premature delivery 10/16/2023     Priority: Medium     19: PTD@34.4wks, PPROM, @ [facility],, UC Q trimester both previous pregnancies at 34 wks, MFM referral ordered      Anxiety 10/16/2023     Priority: Medium     sertraline 100 mg, seeing therapist once/week, psych resources given      Rubella non-immune status, antepartum 10/16/2023     Priority: Medium     Offer vax PP       Rh negative state in antepartum period 10/16/2023     Priority: Medium     11/3/23 given      History of substance use disorder 10/16/2023     Priority: Medium     In remission. Hx of relapse after last delivery. Avoid narcotics in labor and  delivery.       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 June of Genetics and Metabolism, patient has a history of clinical diagnosis of OTC deficiency without any evidence supported by biochemical or molecular analysis. Her most recent plasma amino acids showed a normal citrulline level, and she has had a normal ammonia level in the past. She has never been on amino acid supplementation. However, per their consult note, 10-15% of patients with OTC deficiency may not have a molecular diagnosis, and the possibility of Ms. Strickland being in this 10-15% cannot be ruled out.      OTC deficiency is an X-linked disorder, meaning there is a 50% chance that a male infant is affected and a 50% chance that a female infant is a carrier. In such cases,  risks associated with delivery include the risk of hyperammonemia.      Most women with OTC deficiency do well in pregnancy, particularly in the setting of an anabolic state (e.g. early gestation). However, these women are at risk for metabolic decompensation during periods of catabolism (e.g. first trimester nausea and vomiting, labor, and the postpartum period). Signs of hyperammonemia include delirium, seizure, or coma. For patients with anemia, it is important to consider that blood transfusion increases protein load and can increase the risk of hyperammonemia.     Recommendations:  - Monthly ultrasound for fetal growth assessment beginning at 24 weeks.  - Weekly BPP beginning at 32 weeks.  - Continue care with Genetics and Metabolism.  - Intrapartum management:  Continuous infusion of IV fluids containing 10% dextrose intrapartum and  postpartum. Plan for induction of labor at 39 weeks if patient desires TOLAC. If planned  section, admit the night before to begin infusion. Add insulin drip if needed to maintain euglycemia.  If no enteral intake estimated for >12 hours, or if this is anticipated to occur, please contact Genetics and Metabolism physician on call for other alternative, including IV lipids.  Ammonia levels should be monitored closely. Recommend q6h ammonia levels at the time of admission. If normal x 3, this could be spaced out as per the recommendations of the on call physician. Continue to monitor ammonia in the post delivery state.  Pre-coordination with Genetics and Metabolism is needed if surgery/anesthesia is required.  Please contact Genetics and Metabolism on call provider for management of the  after delivery. (See letter 22 for detailed plan of care)  - Monitor neurologic symptoms intrapartum and postpartum.  - Breastfeeding is not contraindicated.      Conversion disorder with seizures or convulsions 2019     Priority: Medium    Psychogenic nonepileptic seizure 2019     Priority: Medium     Formatting of this note might be different from the original.  Non epileptic. Inpt neuro consult 3/31/19 recommend w/u outpatient after pregnancy.  Formatting of this note might be different from the original.  Formatting of this note might be different from the original.  Non epileptic. Inpt neuro consult 3/31/19 recommend w/u outpatient after pregnancy.    Last Assessment & Plan:   Formatting of this note might be different from the original.  PLAN:  -- Will get updated EEG (last EEG in 2019) to evaluate for any epileptiform activity.   -- Would recommend brain MRI after delivery. If seizures change or concerning features on EEG, could obtain scan earlier.   -- Continue to work with therapist to address mental health and stress.   -- Monitor blood pressure intermittently to ensure it is not getting too  "low.   -- Continue no driving given recurrent episodes of loss of consciousness.   -- Would avoid large bodies of water unattended, recommend showering instead of bathing, avoid climbing to heights.   -- Follow up in Neurology after EEG testing in 3-5 weeks.      Seizures (H) 2019     Priority: Medium     no meds, last seizure 2022, had neuro referral with last pregnancy and was told it was \"anxiety induced\"- she wants to treat with sertraline only. no meds, last seizure 2022, referral sent to neuro, possible seizure 22, has neuro appt 22--feels anxiety induced and wants to treat with sertraline only. PER GUIDELINES - does not qualify for [facility] care      History of asthma 2013     Priority: Medium    Tobacco use disorder 2011     Priority: Medium     Formatting of this note might be different from the original. Tobacco Abuse Formatting of this note might be different from the original. Formatting of this note might be different from the original. Tobacco Abuse      Orthostatic hypotension 2011     Priority: Medium    Benign joint hypermobility 2010     Priority: Medium    Severe major depression without psychotic features (H) 2010     Priority: Medium    Asperger's disorder 2009     Priority: Medium    Developmental expressive writing disorder 2009     Priority: Medium    Attention deficit hyperactivity disorder (ADHD) 10/15/2008     Priority: Medium     Formatting of this note might be different from the original.  LW Onset:  [id number]         HISTORY  Past Medical History:   Diagnosis Date    ADHD (attention deficit hyperactivity disorder)     Anxiety     Anxiety disorder 10/15/2008    Asperger's disorder 2009    Attention deficit hyperactivity disorder (ADHD) 10/15/2008    Formatting of this note might be different from the original.  LW Onset:  [id number]    Benign joint hypermobility 2010    Bipolar disorder (H)      delivery delivered " "2022    Chronic back pain 10/16/2023    Conversion disorder with seizures or convulsions 2019    Depressive disorder     Developmental expressive writing disorder 2009    Headache 2023    History of anemia 10/16/2023    History of asthma 2013    History of eating disorder     History of premature delivery 10/16/2023    07/01/19: PTD@34.4wks, PPROM, @ [facility],,  Q trimester both previous pregnancies at 34 wks, MFM referral ordered    History of substance use     opiate    History of substance use disorder 10/16/2023    In remission. Hx of relapse after last delivery. Avoid narcotics in labor and delivery.     Hyperemesis gravidarum 10/17/2023    Major depressive disorder, recurrent episode, moderate (H) 2023    Migraine 2023    Mixed anxiety and depressive disorder 10/05/2022    started on sertraline 50mg at 8wks started on sertraline 50mg at 8wks    Orthostatic hypotension 2011    OTC (ornithine transcarbamylase deficiency) (H24)     family hx     delivery 2019    Psychogenic nonepileptic seizure     Rh negative state in antepartum period 10/16/2023    Needs rhogam at 28 weeks    Rubella non-immune status, antepartum     Seizures (H) 2019    no meds, last seizure 2022, had neuro referral with last pregnancy and was told it was \"anxiety induced\"- she wants to treat with sertraline only. no meds, last seizure 2022, referral sent to neuro, possible seizure 22, has neuro appt 22--feels anxiety induced and wants to treat with sertraline only. PER GUIDELINES - does not qualify for [facility] care    Severe major depression without psychotic features (H) 2010    Syncope and collapse 2008    Formatting of this note might be different from the original. LW Modifier:  3/10/08 ; Syncope Formatting of this note might be different from the original. Formatting of this note might be different from the original. LW Modifier:  3/10/08 ; Syncope "    Tobacco use disorder 2011    Formatting of this note might be different from the original. Tobacco Abuse Formatting of this note might be different from the original. Formatting of this note might be different from the original. Tobacco Abuse    Uncomplicated asthma     no inhaler use since high school    Vaginal bleeding before 22 weeks gestation 2023       Past Surgical History:   Procedure Laterality Date    AS REMOVAL OF TONSILS,<13 Y/O       SECTION N/A 2022    Procedure:  SECTION;  Surgeon: Gina Pascual MD;  Location: [facility] L[facility]    MYRINGOTOMY, INSERT TUBE BILATERAL, COMBINED      MYRINGOTOMY, INSERT TUBE BILATERAL, COMBINED         Family History   Problem Relation Age of Onset    Breast Cancer Mother     Anxiety Disorder Mother     Depression Mother     Hypertension Mother     Other - See Comments Mother         severe hyperemesis    Diabetes Mother     Ovarian Cancer Mother     Cancer Mother         pelvic, anal, stomach, colon, skin x6    Post-Traumatic Stress Disorder (PTSD) Mother     Cerebrovascular Disease Mother     Coronary Artery Disease Mother     Asthma Mother     Chronic Obstructive Pulmonary Disease Mother     No Known Problems Father     No Known Problems Sister     Emphysema Maternal Grandfather     No Known Problems Paternal Grandmother     No Known Problems Paternal Grandfather        Social History     Tobacco Use    Smoking status: Former     Types: Cigarettes     Quit date: 3/1/2023     Years since quittin.8     Passive exposure: Yes    Smokeless tobacco: Former   Vaping Use    Vaping Use: Never used   Substance Use Topics    Alcohol use: Not Currently    Drug use: Not Currently       Facility-Administered Medications Prior to Admission   Medication Dose Route Frequency Provider Last Rate Last Admin    lidocaine (LMX4) kit   Topical Q1H Regina Panchal MD        lidocaine 1 % 0.1-1 mL  0.1-1 mL Other Q1H Regina Panchal MD         "sodium chloride (PF) 0.9% PF flush 3 mL  3 mL Intracatheter Q8H Regina Cordoba MD        sodium chloride (PF) 0.9% PF flush 3 mL  3 mL Intracatheter q1 min prn Regina Cordoba MD        terbutaline (BRETHINE) injection 0.25 mg  0.25 mg Subcutaneous Once Regina Cordoba MD         Medications Prior to Admission   Medication Sig Dispense Refill Last Dose    cyanocobalamin (VITAMIN B-12) 1000 MCG tablet Take 1 tablet (1,000 mcg) by mouth daily 90 tablet 2 1/7/2024    ferrous sulfate (FEROSUL) 325 (65 Fe) MG tablet Take 1 tablet (325 mg) by mouth daily (with breakfast) 90 tablet 2 1/7/2024    hydrOXYzine vikki (VISTARIL) 25 MG capsule Take 1 capsule (25 mg) by mouth 3 times daily as needed for itching 90 capsule 0 1/7/2024    metroNIDAZOLE (FLAGYL) 500 MG tablet Take 1 tablet (500 mg) by mouth 2 times daily for 7 days 14 tablet 0 More than a month    vitamin C (ASCORBIC ACID) 250 MG tablet Take 1 tablet (250 mg) by mouth daily 90 tablet 2 1/7/2024    escitalopram (LEXAPRO) 10 MG tablet Take 1 tablet (10 mg) by mouth daily for 60 days 30 tablet 1        Allergies   Allergen Reactions    Adhesive Tape Other (See Comments), Hives and Nausea     Burns only with waterproof bandages. [facility] Tegaderm and tape is okay.    Bangura only with waterproof bandages. [facility] Tegaderm and tape is okay.   \"burns\"    Dextromethorphan-Guaifenesin Hives    Guaifenesin Hives and Nausea and Vomiting    Latex Hives, Itching and Rash    Morphine And Related Hives    Mushroom Anaphylaxis    Mushroom Extract Complex Anaphylaxis and Unknown    Other (Do Not Use) Hives and Other (See Comments)     Burns only with waterproof bandages. [facility] Tegaderm and tape is okay.   \"bangura\"        REVIEW OF SYSTEMS:  A 10 point review of systems was completed and was negative other than as noted in the HPI.    PHYSICAL EXAM  Patient Vitals for the past 24 hrs:   BP Temp Temp src Resp Height Weight   01/08/24 1021 109/77 98.7  F " "(37.1  C) Oral 18 1.651 m (5' 5\") 70.3 kg (155 lb)     Gen: NAD, anxious with PIV placement  CV: Warm and well perfused  Lungs: Breathing comfortably on room air  Abd: Gravid, non-tender, non-distended  Ext: Trace peripheral extremity edema      Cervix: Deferred  Membranes: Intact  Presentation: Breech by BSUS.  Estimated Fetal Weight: 6#    FHT:  Monitoring External  FHT: Baseline 120 bpm; moderate variability; accels present; no decelerations  TOCO 2 contractions in 10 minutes    Studies:    Latest Reference Range & Units 24 10:11   WBC 4.0 - 11.0 10e3/uL 9.9   Hemoglobin 11.7 - 15.7 g/dL 11.6 (L)   Hematocrit 35.0 - 47.0 % 36.6   Platelet Count 150 - 450 10e3/uL 293   RBC Count 3.80 - 5.20 10e6/uL 4.42   MCV 78 - 100 fL 83   MCH 26.5 - 33.0 pg 26.2 (L)   MCHC 31.5 - 36.5 g/dL 31.7   RDW 10.0 - 15.0 % 13.2   (L): Data is abnormally low     Latest Reference Range & Units 24 10:11   ABO/Rh(D)  B NEG   Antibody Screen Negative  Positive !   SPECIMEN EXPIRATION DATE     Antibody Identification  Anti-D post RhoGAM   !: Data is abnormal     Latest Reference Range & Units 24 10:11   Ammonia 11 - 51 umol/L 22       Assessment & Plan: 28 year old  at 37w2d by 6w4d US, here for scheduled ECV. Pregnancy is notable for below:     # ECV  # Juan David breech presentation  # H/o CS x1  # Desires TOLAC  # Satisfied parity  Hx of CS x1 indicated for PPROM w/ breech presentation. Previous operative note reviewed. Prior uterine incision: low transverse.  Risks of external cephalic version to include placental abruption, rupture of membranes, injury to fetus, and nonreassuring fetal heart tracing were discussed.  The risks of  section in the case of nonreassuring fetal heart tracing or other concern for fetal well-being were discussed, including the risks of bleeding, infection, injury to surrounding organs, fetal injury, and remote risk of hysterectomy. She consented to a " blood transfusion in the event of a life threatening amount of bleeding. She had time to ask questions and agreed to proceed. Consent was signed. Reviewed PPTL procedure, risks/benefits/alternatives.  - To OR for ECV under spinal    # Ornithine transcarbamylase deficiency   - s/p MFM consult  - MFM recs for intrapartum management:               - Continuous IV D10 intrapartum and postpartum  - QID BG checks, mSSI; Insulin gtt prn  - Contact genetics and metabolism if no enteral intake estimated for >12 hours  - Q6 hour ammonia levels  - Contact Genetics and Metabolism on call provider for management of the  after delivery. (See letter 22 for detailed plan of care)      # Anxiety, depression, ADHD, bipolar disorder, and autism spectrum disorder  # Non epileptic seizure disorder  - Continue PTA  Lexapro    # PNC  - Rh neg, s/p Rhogam  - Rubella NI, MMR PP  - GBS neg  - Imaging: placenta anterior  - Contraception: Desires PPTL, FTP signed on      # FWB:   Reactive NST, no interventions at this time; Breech by BSUS; EFW 6#  - Pre and post ECV NSTs   - Intrauterine resuscitative measures prn  - EFW 37%, AC 41% ()    # PPH Risk:  - Medium - IV, type and screen    Patient discussed with Dr. Adler.      Esther Del Cid MD  OB/GYN Resident PGY-4  1:24 PM 2024      Gabi Adler MD,  24 2:46 PM

## 2024-01-08 NOTE — PLAN OF CARE
Bedside ultrasound showed baby remains breech. Planned version moved up to 10:00am tomorrow. No cervical change. See flowsheet. Contractions slowed down significantly after orally hydrating. Pt will be NPO at midnight and return in morning for version procedure.

## 2024-01-09 PROBLEM — Z98.891 S/P REPEAT LOW TRANSVERSE C-SECTION: Status: ACTIVE | Noted: 2024-01-09

## 2024-01-09 LAB
ABO/RH(D): NORMAL
AMMONIA PLAS-SCNC: 14 UMOL/L (ref 11–51)
AMMONIA PLAS-SCNC: 19 UMOL/L (ref 11–51)
AMMONIA PLAS-SCNC: 21 UMOL/L (ref 11–51)
AMMONIA PLAS-SCNC: 25 UMOL/L (ref 11–51)
FETAL BLEED SCREEN: NORMAL
GLUCOSE BLDC GLUCOMTR-MCNC: 78 MG/DL (ref 70–99)
HGB BLD-MCNC: 8.8 G/DL (ref 11.7–15.7)
HOLD SPECIMEN: NORMAL
SPECIMEN EXPIRATION DATE: NORMAL

## 2024-01-09 PROCEDURE — 36415 COLL VENOUS BLD VENIPUNCTURE: CPT

## 2024-01-09 PROCEDURE — 120N000002 HC R&B MED SURG/OB UMMC

## 2024-01-09 PROCEDURE — 250N000011 HC RX IP 250 OP 636

## 2024-01-09 PROCEDURE — 250N000013 HC RX MED GY IP 250 OP 250 PS 637

## 2024-01-09 PROCEDURE — 3E0T3BZ INTRODUCTION OF ANESTHETIC AGENT INTO PERIPHERAL NERVES AND PLEXI, PERCUTANEOUS APPROACH: ICD-10-PCS | Performed by: STUDENT IN AN ORGANIZED HEALTH CARE EDUCATION/TRAINING PROGRAM

## 2024-01-09 PROCEDURE — 82140 ASSAY OF AMMONIA: CPT

## 2024-01-09 PROCEDURE — 85018 HEMOGLOBIN: CPT

## 2024-01-09 PROCEDURE — 85461 HEMOGLOBIN FETAL: CPT | Performed by: STUDENT IN AN ORGANIZED HEALTH CARE EDUCATION/TRAINING PROGRAM

## 2024-01-09 RX ORDER — OXYTOCIN/0.9 % SODIUM CHLORIDE 30/500 ML
100-340 PLASTIC BAG, INJECTION (ML) INTRAVENOUS CONTINUOUS PRN
Status: DISCONTINUED | OUTPATIENT
Start: 2024-01-09 | End: 2024-01-11 | Stop reason: HOSPADM

## 2024-01-09 RX ORDER — CARBOPROST TROMETHAMINE 250 UG/ML
250 INJECTION, SOLUTION INTRAMUSCULAR
Status: DISCONTINUED | OUTPATIENT
Start: 2024-01-09 | End: 2024-01-11 | Stop reason: HOSPADM

## 2024-01-09 RX ORDER — ACETAMINOPHEN 325 MG/1
650 TABLET ORAL EVERY 6 HOURS PRN
Qty: 100 TABLET | Refills: 0 | Status: SHIPPED | OUTPATIENT
Start: 2024-01-09

## 2024-01-09 RX ORDER — PROCHLORPERAZINE 25 MG
25 SUPPOSITORY, RECTAL RECTAL EVERY 12 HOURS PRN
Status: DISCONTINUED | OUTPATIENT
Start: 2024-01-09 | End: 2024-01-11 | Stop reason: HOSPADM

## 2024-01-09 RX ORDER — IBUPROFEN 600 MG/1
600 TABLET, FILM COATED ORAL EVERY 6 HOURS PRN
Qty: 60 TABLET | Refills: 0 | Status: SHIPPED | OUTPATIENT
Start: 2024-01-09

## 2024-01-09 RX ORDER — ONDANSETRON 2 MG/ML
4 INJECTION INTRAMUSCULAR; INTRAVENOUS EVERY 6 HOURS PRN
Status: DISCONTINUED | OUTPATIENT
Start: 2024-01-09 | End: 2024-01-11 | Stop reason: HOSPADM

## 2024-01-09 RX ORDER — IBUPROFEN 800 MG/1
800 TABLET, FILM COATED ORAL EVERY 6 HOURS
Status: DISCONTINUED | OUTPATIENT
Start: 2024-01-09 | End: 2024-01-11 | Stop reason: HOSPADM

## 2024-01-09 RX ORDER — AMOXICILLIN 250 MG
2 CAPSULE ORAL 2 TIMES DAILY
Status: DISCONTINUED | OUTPATIENT
Start: 2024-01-09 | End: 2024-01-11 | Stop reason: HOSPADM

## 2024-01-09 RX ORDER — OXYCODONE HYDROCHLORIDE 5 MG/1
5 TABLET ORAL EVERY 4 HOURS PRN
Status: DISCONTINUED | OUTPATIENT
Start: 2024-01-09 | End: 2024-01-11 | Stop reason: HOSPADM

## 2024-01-09 RX ORDER — ESCITALOPRAM OXALATE 10 MG/1
10 TABLET ORAL DAILY
Status: DISCONTINUED | OUTPATIENT
Start: 2024-01-09 | End: 2024-01-11 | Stop reason: HOSPADM

## 2024-01-09 RX ORDER — ONDANSETRON 4 MG/1
4 TABLET, ORALLY DISINTEGRATING ORAL EVERY 6 HOURS PRN
Status: DISCONTINUED | OUTPATIENT
Start: 2024-01-09 | End: 2024-01-11 | Stop reason: HOSPADM

## 2024-01-09 RX ORDER — AMOXICILLIN 250 MG
1 CAPSULE ORAL DAILY
Qty: 100 TABLET | Refills: 0 | Status: SHIPPED | OUTPATIENT
Start: 2024-01-09 | End: 2024-03-15

## 2024-01-09 RX ORDER — ACETAMINOPHEN 325 MG/1
975 TABLET ORAL EVERY 6 HOURS
Status: DISCONTINUED | OUTPATIENT
Start: 2024-01-09 | End: 2024-01-11 | Stop reason: HOSPADM

## 2024-01-09 RX ORDER — MISOPROSTOL 200 UG/1
400 TABLET ORAL
Status: DISCONTINUED | OUTPATIENT
Start: 2024-01-09 | End: 2024-01-11 | Stop reason: HOSPADM

## 2024-01-09 RX ORDER — MISOPROSTOL 200 UG/1
800 TABLET ORAL
Status: DISCONTINUED | OUTPATIENT
Start: 2024-01-09 | End: 2024-01-11 | Stop reason: HOSPADM

## 2024-01-09 RX ORDER — TRANEXAMIC ACID 10 MG/ML
1 INJECTION, SOLUTION INTRAVENOUS EVERY 30 MIN PRN
Status: DISCONTINUED | OUTPATIENT
Start: 2024-01-09 | End: 2024-01-11 | Stop reason: HOSPADM

## 2024-01-09 RX ORDER — DEXTROSE, SODIUM CHLORIDE, SODIUM LACTATE, POTASSIUM CHLORIDE, AND CALCIUM CHLORIDE 5; .6; .31; .03; .02 G/100ML; G/100ML; G/100ML; G/100ML; G/100ML
INJECTION, SOLUTION INTRAVENOUS CONTINUOUS
Status: DISCONTINUED | OUTPATIENT
Start: 2024-01-09 | End: 2024-01-09

## 2024-01-09 RX ORDER — MODIFIED LANOLIN
OINTMENT (GRAM) TOPICAL
Status: DISCONTINUED | OUTPATIENT
Start: 2024-01-09 | End: 2024-01-11 | Stop reason: HOSPADM

## 2024-01-09 RX ORDER — LIDOCAINE 40 MG/G
CREAM TOPICAL
Status: DISCONTINUED | OUTPATIENT
Start: 2024-01-09 | End: 2024-01-11 | Stop reason: HOSPADM

## 2024-01-09 RX ORDER — METOCLOPRAMIDE HYDROCHLORIDE 5 MG/ML
10 INJECTION INTRAMUSCULAR; INTRAVENOUS EVERY 6 HOURS PRN
Status: DISCONTINUED | OUTPATIENT
Start: 2024-01-09 | End: 2024-01-11 | Stop reason: HOSPADM

## 2024-01-09 RX ORDER — OXYTOCIN/0.9 % SODIUM CHLORIDE 30/500 ML
340 PLASTIC BAG, INJECTION (ML) INTRAVENOUS CONTINUOUS PRN
Status: DISCONTINUED | OUTPATIENT
Start: 2024-01-09 | End: 2024-01-11 | Stop reason: HOSPADM

## 2024-01-09 RX ORDER — KETOROLAC TROMETHAMINE 30 MG/ML
30 INJECTION, SOLUTION INTRAMUSCULAR; INTRAVENOUS EVERY 6 HOURS
Status: COMPLETED | OUTPATIENT
Start: 2024-01-09 | End: 2024-01-09

## 2024-01-09 RX ORDER — AMOXICILLIN 250 MG
1 CAPSULE ORAL 2 TIMES DAILY
Status: DISCONTINUED | OUTPATIENT
Start: 2024-01-09 | End: 2024-01-11 | Stop reason: HOSPADM

## 2024-01-09 RX ORDER — METOCLOPRAMIDE 10 MG/1
10 TABLET ORAL EVERY 6 HOURS PRN
Status: DISCONTINUED | OUTPATIENT
Start: 2024-01-09 | End: 2024-01-11 | Stop reason: HOSPADM

## 2024-01-09 RX ORDER — OXYTOCIN 10 [USP'U]/ML
10 INJECTION, SOLUTION INTRAMUSCULAR; INTRAVENOUS
Status: DISCONTINUED | OUTPATIENT
Start: 2024-01-09 | End: 2024-01-11 | Stop reason: HOSPADM

## 2024-01-09 RX ORDER — METHYLERGONOVINE MALEATE 0.2 MG/ML
200 INJECTION INTRAVENOUS
Status: DISCONTINUED | OUTPATIENT
Start: 2024-01-09 | End: 2024-01-11 | Stop reason: HOSPADM

## 2024-01-09 RX ORDER — HYDROCORTISONE 25 MG/G
CREAM TOPICAL 3 TIMES DAILY PRN
Status: DISCONTINUED | OUTPATIENT
Start: 2024-01-09 | End: 2024-01-11 | Stop reason: HOSPADM

## 2024-01-09 RX ORDER — PROCHLORPERAZINE MALEATE 10 MG
10 TABLET ORAL EVERY 6 HOURS PRN
Status: DISCONTINUED | OUTPATIENT
Start: 2024-01-09 | End: 2024-01-11 | Stop reason: HOSPADM

## 2024-01-09 RX ORDER — SIMETHICONE 80 MG
80 TABLET,CHEWABLE ORAL 4 TIMES DAILY PRN
Status: DISCONTINUED | OUTPATIENT
Start: 2024-01-09 | End: 2024-01-11 | Stop reason: HOSPADM

## 2024-01-09 RX ORDER — LIDOCAINE 40 MG/G
CREAM TOPICAL
Status: DISCONTINUED | OUTPATIENT
Start: 2024-01-09 | End: 2024-01-09 | Stop reason: HOSPADM

## 2024-01-09 RX ORDER — LANOLIN ALCOHOL/MO/W.PET/CERES
1000 CREAM (GRAM) TOPICAL DAILY
Status: DISCONTINUED | OUTPATIENT
Start: 2024-01-09 | End: 2024-01-11 | Stop reason: HOSPADM

## 2024-01-09 RX ORDER — BISACODYL 10 MG
10 SUPPOSITORY, RECTAL RECTAL DAILY PRN
Status: DISCONTINUED | OUTPATIENT
Start: 2024-01-10 | End: 2024-01-11 | Stop reason: HOSPADM

## 2024-01-09 RX ADMIN — ACETAMINOPHEN 975 MG: 325 TABLET, FILM COATED ORAL at 20:44

## 2024-01-09 RX ADMIN — DOCUSATE SODIUM 50 MG AND SENNOSIDES 8.6 MG 1 TABLET: 8.6; 5 TABLET, FILM COATED ORAL at 08:46

## 2024-01-09 RX ADMIN — DOCUSATE SODIUM 50 MG AND SENNOSIDES 8.6 MG 2 TABLET: 8.6; 5 TABLET, FILM COATED ORAL at 20:44

## 2024-01-09 RX ADMIN — ACETAMINOPHEN 975 MG: 325 TABLET, FILM COATED ORAL at 14:50

## 2024-01-09 RX ADMIN — KETOROLAC TROMETHAMINE 30 MG: 30 INJECTION, SOLUTION INTRAMUSCULAR; INTRAVENOUS at 16:46

## 2024-01-09 RX ADMIN — CYANOCOBALAMIN TAB 1000 MCG 1000 MCG: 1000 TAB at 11:00

## 2024-01-09 RX ADMIN — IBUPROFEN 800 MG: 800 TABLET, FILM COATED ORAL at 22:33

## 2024-01-09 RX ADMIN — ACETAMINOPHEN 975 MG: 325 TABLET, FILM COATED ORAL at 03:10

## 2024-01-09 RX ADMIN — KETOROLAC TROMETHAMINE 30 MG: 30 INJECTION, SOLUTION INTRAMUSCULAR; INTRAVENOUS at 04:00

## 2024-01-09 RX ADMIN — KETOROLAC TROMETHAMINE 30 MG: 30 INJECTION, SOLUTION INTRAMUSCULAR; INTRAVENOUS at 10:59

## 2024-01-09 RX ADMIN — SIMETHICONE 80 MG: 80 TABLET, CHEWABLE ORAL at 20:54

## 2024-01-09 RX ADMIN — ACETAMINOPHEN 975 MG: 325 TABLET, FILM COATED ORAL at 08:46

## 2024-01-09 RX ADMIN — ESCITALOPRAM OXALATE 10 MG: 10 TABLET ORAL at 11:00

## 2024-01-09 RX ADMIN — SIMETHICONE 80 MG: 80 TABLET, CHEWABLE ORAL at 08:47

## 2024-01-09 ASSESSMENT — ACTIVITIES OF DAILY LIVING (ADL)
ADLS_ACUITY_SCORE: 18

## 2024-01-09 NOTE — PROGRESS NOTES
Pt transferred to L&D PACU at  following  section delivery of viable female infant at . TAP block performed in OR. Infant and mother in stable condition.

## 2024-01-09 NOTE — ANESTHESIA PREPROCEDURE EVALUATION
"Anesthesia Pre-Procedure Evaluation    Patient: Myrtle Strickland   MRN: [id number] : 1995        Procedure : Procedure(s):  EXTERNAL CEPHALIC VERSION UNDER SPINAL          Past Medical History:   Diagnosis Date    ADHD (attention deficit hyperactivity disorder)     Anxiety     Anxiety disorder 10/15/2008    Asperger's disorder 2009    Attention deficit hyperactivity disorder (ADHD) 10/15/2008    Formatting of this note might be different from the original.  LW Onset:  [id number]    Benign joint hypermobility 2010    Bipolar disorder (H)      delivery delivered 2022    Chronic back pain 10/16/2023    Conversion disorder with seizures or convulsions 2019    Depressive disorder     Developmental expressive writing disorder 2009    Headache 2023    History of anemia 10/16/2023    History of asthma 2013    History of eating disorder     History of premature delivery 10/16/2023    07/01/19: PTD@34.4wks, PPROM, @ [facility],,  Q trimester both previous pregnancies at 34 wks, [facility] referral ordered    History of substance use     opiate    History of substance use disorder 10/16/2023    In remission. Hx of relapse after last delivery. Avoid narcotics in labor and delivery.     Hyperemesis gravidarum 10/17/2023    Major depressive disorder, recurrent episode, moderate (H) 2023    Migraine 2023    Mixed anxiety and depressive disorder 10/05/2022    started on sertraline 50mg at 8wks started on sertraline 50mg at 8wks    Orthostatic hypotension 2011    OTC (ornithine transcarbamylase deficiency) (H24)     family hx     delivery 2019    Psychogenic nonepileptic seizure     Rh negative state in antepartum period 10/16/2023    Needs rhogam at 28 weeks    Rubella non-immune status, antepartum     Seizures (H) 2019    no meds, last seizure 2022, had neuro referral with last pregnancy and was told it was \"anxiety induced\"- she wants to treat with " "sertraline only. no meds, last seizure 2022, referral sent to neuro, possible seizure 22, has neuro appt 22--feels anxiety induced and wants to treat with sertraline only. PER GUIDELINES - does not qualify for CNM care    Severe major depression without psychotic features (H) 2010    Syncope and collapse 2008    Formatting of this note might be different from the original. LW Modifier:  3/10/08 ; Syncope Formatting of this note might be different from the original. Formatting of this note might be different from the original. LW Modifier:  3/10/08 ; Syncope    Tobacco use disorder 2011    Formatting of this note might be different from the original. Tobacco Abuse Formatting of this note might be different from the original. Formatting of this note might be different from the original. Tobacco Abuse    Uncomplicated asthma     no inhaler use since high school    Vaginal bleeding before 22 weeks gestation 2023      Past Surgical History:   Procedure Laterality Date    AS REMOVAL OF TONSILS,<11 Y/O       SECTION N/A 2022    Procedure:  SECTION;  Surgeon: Gina Pascual MD;  Location: [facility] L[facility]    MYRINGOTOMY, INSERT TUBE BILATERAL, COMBINED      MYRINGOTOMY, INSERT TUBE BILATERAL, COMBINED        Allergies   Allergen Reactions    Adhesive Tape Other (See Comments), Hives and Nausea     Burns only with waterproof bandages. [facility] Tegaderm and tape is okay.    Koehler only with waterproof bandages. [facility] Tegaderm and tape is okay.   \"burns\"    Dextromethorphan-Guaifenesin Hives    Guaifenesin Hives and Nausea and Vomiting    Latex Hives, Itching and Rash    Morphine And Related Hives    Mushroom Anaphylaxis    Mushroom Extract Complex Anaphylaxis and Unknown    Other (Do Not Use) Hives and Other (See Comments)     Burns only with waterproof bandages. [facility] Tegaderm and tape is okay.   \"burns\"      Social History     Tobacco Use    " Smoking status: Former     Types: Cigarettes     Quit date: 3/1/2023     Years since quittin.8     Passive exposure: Yes    Smokeless tobacco: Former   Substance Use Topics    Alcohol use: Not Currently      Wt Readings from Last 1 Encounters:   24 70.3 kg (155 lb)        Anesthesia Evaluation   Pt has had prior anesthetic. Type: OB Labor Epidural and Regional.    No history of anesthetic complications       ROS/MED HX  ENT/Pulmonary:     (+)                tobacco use,     Intermittent, asthma                  Neurologic: Comment: Psychogenic nonepileptic seizure    (+)      migraines,                          Cardiovascular:  - neg cardiovascular ROS     METS/Exercise Tolerance: >4 METS    Hematologic:  - neg hematologic  ROS     Musculoskeletal:  - neg musculoskeletal ROS     GI/Hepatic:     (+) GERD,                   Renal/Genitourinary:       Endo: Comment: - Ornithine Transcarbamylase deficiency      Psychiatric/Substance Use: Comment: Hx Substance Use in remission     Autism Spectrum Disorder     (+) psychiatric history anxiety, bipolar and depression       Infectious Disease:       Malignancy:       Other:   -  labor  - History of  delivery at 34 weeks  - History of CS at 34 weeks for PPROM and breech presentation     (+)  , ,previous          Physical Exam    Airway        Mallampati: II   TM distance: > 3 FB   Neck ROM: full   Mouth opening: > 3 cm    Respiratory Devices and Support         Dental       (+) Multiple visibly decayed, broken teeth      Cardiovascular          Rhythm and rate: regular and tachycardia     Pulmonary   pulmonary exam normal        breath sounds clear to auscultation           OUTSIDE LABS:  CBC:   Lab Results   Component Value Date    WBC 9.9 2024    WBC 9.7 2023    HGB 11.6 (L) 2024    HGB 10.4 (L) 2023    HCT 36.6 2024    HCT 32.2 (L) 2023     2024     2023     BMP:   Lab Results  "  Component Value Date     (L) 2023     10/03/2022    POTASSIUM 4.5 2023    POTASSIUM 4.0 10/03/2022    CHLORIDE 106 2023    CHLORIDE 104 10/03/2022    CO2 22 2023    CO2 31 10/03/2022    BUN 7.1 2023    BUN 20 10/03/2022    CR 0.70 2023    CR 1.60 (H) 10/05/2022     (H) 2024    GLC 92 2024     COAGS: No results found for: \"PTT\", \"INR\", \"FIBR\"  POC: No results found for: \"BGM\", \"HCG\", \"HCGS\"  HEPATIC:   Lab Results   Component Value Date    ALBUMIN 3.4 (L) 2023    PROTTOTAL 6.4 2023    ALT 11 2023    AST 19 2023    ALKPHOS 218 (H) 2023    BILITOTAL 0.3 2023    SHARRON 22 2024     OTHER:   Lab Results   Component Value Date    CONSUELO 8.9 2023       Anesthesia Plan    ASA Status:  3, emergent    NPO Status:  ELEVATED Aspiration Risk/Unknown (patient ate full meal after ECV)    Anesthesia Type: Epidural (epidural in situ from ECV).   Induction: N/a.   Maintenance: N/A.        Consents    Anesthesia Plan(s) and associated risks, benefits, and realistic alternatives discussed. Questions answered and patient/representative(s) expressed understanding.     - Discussed: Risks, Benefits and Alternatives for BOTH SEDATION and the PROCEDURE were discussed     - Discussed with:  Patient       - Patient is DNR/DNI Status: No     Use of blood products discussed: Yes.     - Discussed with: Patient.     - Consented: consented to blood products     Postoperative Care    Pain management: IV analgesics, Oral pain medications, Neuraxial analgesia, Multi-modal analgesia, Peripheral nerve block (Single Shot).   PONV prophylaxis: Ondansetron (or other 5HT-3), Dexamethasone or Solumedrol     Comments:    Other Comments: Myrtle Em is a 29 yo  at 37w2d by 6w4d US who presented earlier today for scheduled ECV, ECV was unsuccessful and now for  delivery. 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 Of note, CSE was performed for ECV, and epidural catheter was left in situ. Test dose performed after ECV and negative. Will plan to dose in situ epidural catheter for  delivery, standard ASA monitors, and bilateral transversus abdominal plane block for post-op pain control. Of note, patient did eat a full meal after ECV was completed (~2 hours ago), declining metoclopramide stating she gets severe anxiety. Risks and benefits of anesthesia/procedure explained including but not limited to allergic reaction, aspiration, need for invasive airway, somnolence, delirium, vocal cord/dental trauma if emergent GA required, nausea/vomiting, arrhythmia, stroke, bleeding, need for blood transfusion, myocardial infarction.                 Germaine Doty MD    I have reviewed the pertinent notes and labs in the chart from the past 30 days and (re)examined the patient.  Any updates or changes from those notes are reflected in this note.

## 2024-01-09 NOTE — DISCHARGE SUMMARY
[facility] Discharge Summary    Myrtle Strickland MRN# [id number]   Age: 28 year old YOB: 1995     Date of Admission:  2024  Date of Discharge:  2024  Admitting Physician:  Gabi Adler MD  Discharge Physician:  Gina Pascual MD             Admission Diagnoses:   -  at 37w2d  - H/o CS x1   - Desire for permanent sterilization   - MACK in remission  - Rh negative status  - Rubella NI  - Varicella NI  - Hep B NI  - MDD/SARAH  - Ornithine Transcarbamylase deficiency  - Anxiety, depression, ADHD, bipolar disorder, and autism spectrum disorder  - Non epileptic seizure disorder  - Hx eating disorder  -  contractions (admit )          Discharge Diagnosis:     - Same, now , delivered           Procedures:     Procedure(s): Repeat low transverse  section with single  layer closure via Pfannenstiel skin incision  Bilateral salpingectomy                 Medications Prior to Admission:     Facility-Administered Medications Prior to Admission   Medication Dose Route Frequency Provider Last Rate Last Admin    lidocaine (LMX4) kit   Topical Q1H PRN Regina Cordoba MD        lidocaine 1 % 0.1-1 mL  0.1-1 mL Other Q1H PRN Regina Cordoba MD        sodium chloride (PF) 0.9% PF flush 3 mL  3 mL Intracatheter Q8H Regina Cordoba MD        sodium chloride (PF) 0.9% PF flush 3 mL  3 mL Intracatheter q1 min prn Regina Cordoba MD        terbutaline (BRETHINE) injection 0.25 mg  0.25 mg Subcutaneous Once Regina Cordoba MD         Medications Prior to Admission   Medication Sig Dispense Refill Last Dose    cyanocobalamin (VITAMIN B-12) 1000 MCG tablet Take 1 tablet (1,000 mcg) by mouth daily 90 tablet 2 2024    ferrous sulfate (FEROSUL) 325 (65 Fe) MG tablet Take 1 tablet (325 mg) by mouth daily (with breakfast) 90 tablet 2 2024    hydrOXYzine vikki (VISTARIL) 25 MG capsule Take 1 capsule (25 mg) by mouth 3 times daily as needed for itching 90 capsule 0  1/7/2024    metroNIDAZOLE (FLAGYL) 500 MG tablet Take 1 tablet (500 mg) by mouth 2 times daily for 7 days 14 tablet 0 More than a month    vitamin C (ASCORBIC ACID) 250 MG tablet Take 1 tablet (250 mg) by mouth daily 90 tablet 2 1/7/2024    escitalopram (LEXAPRO) 10 MG tablet Take 1 tablet (10 mg) by mouth daily for 60 days 30 tablet 1              Discharge Medications:        Review of your medicines        START taking        Dose / Directions   acetaminophen 325 MG tablet  Commonly known as: TYLENOL      Dose: 650 mg  Take 2 tablets (650 mg) by mouth every 6 hours as needed for mild pain Start after Delivery.  Quantity: 100 tablet  Refills: 0     cyclobenzaprine 5 MG tablet  Commonly known as: FLEXERIL      Dose: 5 mg  Take 1 tablet (5 mg) by mouth 3 times daily as needed for muscle spasms  Quantity: 30 tablet  Refills: 0     ibuprofen 600 MG tablet  Commonly known as: ADVIL/MOTRIN      Dose: 600 mg  Take 1 tablet (600 mg) by mouth every 6 hours as needed for moderate pain Start after delivery  Quantity: 60 tablet  Refills: 0     oxyCODONE 5 MG tablet  Commonly known as: ROXICODONE  Used for: Acute post-operative pain      Dose: 5 mg  Take 1 tablet (5 mg) by mouth every 6 hours as needed for pain  Quantity: 12 tablet  Refills: 0     senna-docusate 8.6-50 MG tablet  Commonly known as: SENOKOT-S/PERICOLACE      Dose: 1 tablet  Take 1 tablet by mouth daily Start after delivery.  Quantity: 100 tablet  Refills: 0            CONTINUE these medicines which have NOT CHANGED        Dose / Directions   cyanocobalamin 1000 MCG tablet  Commonly known as: VITAMIN B-12  Used for: High-risk pregnancy, unspecified trimester      Dose: 1,000 mcg  Take 1 tablet (1,000 mcg) by mouth daily  Quantity: 90 tablet  Refills: 2     escitalopram 10 MG tablet  Commonly known as: LEXAPRO  Used for: Major depressive disorder, recurrent episode, moderate (H)      Dose: 10 mg  Take 1 tablet (10 mg) by mouth daily for 60 days  Quantity: 30  tablet  Refills: 1     ferrous sulfate 325 (65 Fe) MG tablet  Commonly known as: FEROSUL  Used for: High-risk pregnancy, unspecified trimester      Dose: 325 mg  Take 1 tablet (325 mg) by mouth daily (with breakfast)  Quantity: 90 tablet  Refills: 2     hydrOXYzine vikki 25 MG capsule  Commonly known as: VISTARIL  Used for: High-risk pregnancy, unspecified trimester, Anxiety disorder, unspecified type      Dose: 25 mg  Take 1 capsule (25 mg) by mouth 3 times daily as needed for itching  Quantity: 90 capsule  Refills: 0     metroNIDAZOLE 500 MG tablet  Commonly known as: FLAGYL  Used for: Bacterial vaginosis in pregnancy      Dose: 500 mg  Take 1 tablet (500 mg) by mouth 2 times daily for 7 days  Quantity: 14 tablet  Refills: 0     vitamin C 250 MG tablet  Commonly known as: ASCORBIC ACID  Used for: High-risk pregnancy, unspecified trimester      Dose: 250 mg  Take 1 tablet (250 mg) by mouth daily  Quantity: 90 tablet  Refills: 2               Where to get your medicines        These medications were sent to [facility] [address]  [address] [address]      Phone: [phone]   acetaminophen 325 MG tablet  cyclobenzaprine 5 MG tablet  ibuprofen 600 MG tablet  oxyCODONE 5 MG tablet  senna-docusate 8.6-50 MG tablet               Consultations:   Anesthesia          Brief Admission History:   Ms. Myrtle Strickland is a 28 year old now  who initially presented at 37w2d for ECV.     She reports good fetal movement. Continues to have frequent contractions, these feel a bit more intense but have been present for the last few months. Denies LOF, vaginal bleeding, or other concerns today. Feeling very anxious about spinal and PIV.      Pts ECV was unsuccessful. She remained inpatient post-procedure while her epidural anesthesia wore off. Pt was subsequently noted to be africa painfully. Her cervix was re-examined and had made change to 4 cm.  delivery  was recommended and she agreed to proceed. She desired a tubal ligation and had previously signed her federal tubal papers on 11/21/23.        Intraoperative course   The procedure was uncomplicated.   mL. Findings below. See operative report for details.     Minimal subcutaneous scarring, moderate rectofascial adhesions, no intraabdominal adhesions, filmy adhesions between bladder and lower uterine segment  Clear amniotic fluid  Liveborn female infant in breech presentation. Born at 2132 on 01/08/24. No nuchal cord. Apgars 8 at 1 minute & 9 at 5 minutes. Weight 2710 g.  Normal uterus, fallopian tubes, and ovaries.   Hemostatic pedicles after excision of bilateral fallopian tubes       Postpartum Course   The patient's hospital course was unremarkable.  She recovered as anticipated and experienced no post-operative complications. Her ammonia was checked q8h for the first 24 hours following delivery and remained normal. On discharge, her pain was well controlled. Vaginal bleeding is similar to peak menstrual flow.  Voiding without difficulty.  Ambulating well and tolerating a normal diet.  No fever or significant wound drainage.  Breastfeeding well.  Infant is stable.  She was discharged on post-partum day #3.    Post-partum hemoglobin:   Hemoglobin   Date Value Ref Range Status   01/09/2024 8.8 (L) 11.7 - 15.7 g/dL Final             Discharge Instructions and Follow-Up:     Discharge diet: Regular   Discharge activity: No lifting greater than 20 lbs, pushing, pulling, or other strenuous activity for 6 weeks. Pelvic rest for 6 weeks including no sexual intercourse, tampons, or douching. No driving until you can slam on the brakes without pain or while on narcotic pain medications.    Discharge follow-up: Follow up with primary OB for routine postpartum visit in 6 weeks   Wound care: Keep incision clean and dry           Discharge Disposition:     Discharged to home in stable condition      Mariana Harris  MD  OB/GYN PGY-2  01/11/2024 10:15 AM    I have seen, examined, and counseled the patient on the day of discharge. I have reviewed and edited the summary.  Gina Pascual

## 2024-01-09 NOTE — PLAN OF CARE
Data: Myrtle Strickland transferred to [id number] via cart at 0021. Baby transferred via parent's arms.  Action: Receiving unit notified of transfer: Yes. Patient and family notified of room change. Report given to SHAMA Colindres at 6040. Belongings sent to receiving unit. Accompanied by Registered Nurse. Oriented patient to surroundings. Call light within reach. ID bands double-checked with receiving RN.  Response: Patient tolerated transfer and is stable.

## 2024-01-09 NOTE — ANESTHESIA POSTPROCEDURE EVALUATION
Patient: Myrtle Strickland    Procedure: Procedure(s):   section  Combined  Section, Salpingectomy Bilateral       Anesthesia Type:  Epidural    Note:  Disposition: Inpatient   Postop Pain Control: Uneventful            Sign Out: Well controlled pain   PONV: No   Neuro/Psych: Uneventful            Sign Out: Acceptable/Baseline neuro status   Airway/Respiratory: Uneventful            Sign Out: Acceptable/Baseline resp. status   CV/Hemodynamics: Uneventful            Sign Out: Acceptable CV status; No obvious hypovolemia; No obvious fluid overload   Other NRE: NONE   DID A NON-ROUTINE EVENT OCCUR? No     Epidural-to- Updated ASA: 3 Emergent      Last vitals:  Vitals Value Taken Time   /68 24 0010   Temp     Pulse 89 24 0010   Resp 20 24 0010   SpO2 98 % 24 0010       Electronically Signed By: Germaine Doty MD  2024  12:48 AM

## 2024-01-09 NOTE — ANESTHESIA PROCEDURE NOTES
"TAP Procedure Note    Pre-Procedure   Staff -        Anesthesiologist:  Germaine Doty MD       Resident/Fellow: Rashawn Fisher MD       Performed By: resident       Location: OR       Procedure Start/Stop Times: 1/8/2024 10:20 PM       Pre-Anesthestic Checklist: patient identified, IV checked, site marked, risks and benefits discussed, informed consent, monitors and equipment checked, pre-op evaluation, at physician/surgeon's request and post-op pain management  Timeout:       Correct Patient: Yes        Correct Procedure: Yes        Correct Site: Yes        Correct Position: Yes        Correct Laterality: Yes   Procedure Documentation  Procedure: TAP       Diagnosis: POST OP PAIN       Laterality: bilateral       Patient Position: supine       Skin prep: Chloraprep       Insertion Site: T8-9.       Needle Type: short bevel       Needle Gauge: 21.        Needle Length (millimeters): 110        Ultrasound guided       1. Ultrasound was used to identify targeted nerve, plexus, vascular marker, or fascial plane and place a needle adjacent to it in real-time.       2. Ultrasound was used to visualize the spread of anesthetic in close proximity to the above referenced structure.    Assessment/Narrative         The placement was negative for: blood aspirated, painful injection and site bleeding       Paresthesias: No.       Bolus given via needle. no blood aspirated via catheter.        Secured via.        Insertion/Infusion Method: Single Shot       Complications: none    Medication(s) Administered   Bupivacaine 0.25% PF (Infiltration) - Infiltration   20 mL - 1/8/2024 10:20:00 PM  Bupivacaine liposome (Exparel) 1.3% LA inj susp (Infiltration) - Infiltration   20 mL - 1/8/2024 10:20:00 PM  Medication Administration Time: 1/8/2024 10:20 PM      FOR [facility] ([facility] ONLY:   Pain Team Contact information: please page the Pain Team Via [employer]. Search \"Pain\". During daytime hours, please page the attending first. At " night please page the resident first.

## 2024-01-09 NOTE — PLAN OF CARE
Vital signs within normal limits. Postpartum checks within normal limits - see flow record. Patient eating and drinking normally. Patient richardson tube removed 05:10.  No apparent signs of infection. Incision healing well.   Action: Patient medicated during the shift for pain and cramping. See MAR.   Response: Positive attachment behaviors observed with infant. Support significant other present.   Problem:  Delivery  Goal: Bleeding is Controlled  Outcome: Progressing     Problem: Pain Acute  Goal: Optimal Pain Control and Function  Outcome: Progressing   Goal Outcome Evaluation:

## 2024-01-09 NOTE — OP NOTE
[facility]  Operative Note     Surgery Date:  2024  Surgeon:  Gabi Adler MD   Assistants:  Nancy Sibley MD, PGY2    Pre-op Diagnosis:    - Intrauterine pregnancy at 37w2d  - Breech presentation   - Labor   - Desire for permanent sterilization        Post-op Diagnosis:    - Same   - Liveborn female  infant     Procedure:    - Repeat low-transverse  section with single layer uterine closure via pfannenstiel incision  - Bilateral salpingectomy     Anesthesia:  Bolused epidural   QBL:    522 mL  IVF:    1400 mL crystalloid  UOP:    150 mL clear urine at the end of the case  Drains:   Cristobal Catheter   Specimens:   Routine cord blood, bilateral fallopian tubes   Antibiotics:  2g Ancef  Additional medications: None  Complications:  None    Indications:   Myrtle Strickland is a 28 year old  at 37w2d admitted for scheduled ECV with an epidural. After ECV was unsuccessful, she remained inpatient while her anesthesia wore off. After she was no longer feeling the effects of the epidural, she started noticing painful contractions. Her cervix was examined and noted to be 4 cm. Given fetal breech presentation,  delivery was recommended. The risks, benefits, and alternatives of  section were discussed with the patient, and she agreed to proceed.     Findings:   Minimal subcutaneous scarring, moderate rectofascial adhesions, no intraabdominal adhesions, filmy adhesions between bladder and lower uterine segment  Clear amniotic fluid  Liveborn female infant in breech presentation. Born at 2132 on 24. No nuchal cord. Apgars 8 at 1 minute & 9 at 5 minutes. Weight 2710 g.  Normal uterus, fallopian tubes, and ovaries.   Hemostatic pedicles after excision of bilateral fallopian tubes    Procedure Details:   The patient was brought to the OR, where adequate bolused epidural anesthesia was administered. She was placed in the dorsal supine position with a slight leftward tilt.  She was prepped and draped in the usual sterile fashion. A surgical time out was performed. A pfannenstiel skin incision was made with the scalpel, and carried down to the underlying fascia with sharp and blunt dissection. The fascia was incised in the midline, and the incision was extended laterally with the Reyna scissors. The superior aspect of the fascia was grasped with the Kocher clamps and dissected off of the underlying rectus muscles with blunt and sharp dissection. Attention was then turned to the inferior aspect of the fascia, which was similarly dissected off of the underlying rectus muscles. The rectus muscles were  in the midline, and the peritoneum was entered bluntly, and the opening was extended with digital pressure and electrocautery. The bladder blade was placed. A transverse hysterotomy was made with the scalpel in the lower uterine segment, and the incision was extended with digital pressure. The infant was noted to be in the  mary breech position, and was delivered atraumatically. The shoulders delivered easily. No nuchal cord was noted. The cord was doubly clamped and cut after 60 seconds, and the infant was handed off to the awaiting nursery staff. A segment of cord was cut and sent to lab. The placenta was delivered with gentle traction on the umbilical cord and uterine massage. The placenta was discarded. The uterus was exteriorized and cleared of all clots and debris. Uterine tone was noted to be firm with 30 units of pitocin given through the running IV and uterine massage. The hysterotomy was closed with a running locked suture of 0 Vicryl.The hysterotomy was noted to be hemostatic. Attention was then turned to the right fallopian tube which was elevated with pilo clamps. The handheld ligasure device was used to cauterize and excise the fallopian tube along the mesosalpinx to the cornua where the tube was transected and removed. The left fallopian tube was similarly excised.  Both fallopian tubes were sent to pathology. The posterior cul-de-sac was cleared of all clots and debris. The uterus was returned to the abdomen. The pericolic gutters were cleared of all clots and debris. The hysterotomy was reexamined and noted to be hemostatic. The fascia and rectus muscles were examined and areas of oozing were controlled with electrocautery. The fascia was closed with a running 0 Vicryl suture. The subcutaneous tissue was irrigated and areas of oozing were controlled with electrocautery. The subcutaneous tissue was less than 2cm in thickness, and was therefore not closed. The skin was closed with a running subcuticular 4-0 Monocryl suture and covered with a sterile dressing.    All sponge, needle, and instrument counts were correct. The patient tolerated the procedure well, and was transferred to recovery in stable condition. Dr. Adler was present and scrubbed for the procedure.     Nancy Sibley MD  OB/GYN PGY-2  1/8/2024 10:24 PM    I was present and scrubbed for the entire procedure.  Gabi Adler MD

## 2024-01-09 NOTE — PROGRESS NOTES
"Brief Progress Note     Called to patient room for assessment d/t painful contractions     S: patient is doing okay. Started getting very uncomfortable with contraction pain after her spinal started wearing off in the last hour and a half. Denies loss of fluid. Normal fetal movement. Also having back pain.     /74   Pulse 103   Temp 98.4  F (36.9  C) (Oral)   Resp 16   Ht 1.651 m (5' 5\")   Wt 70.3 kg (155 lb)   LMP 2023 (Approximate)   SpO2 100%   BMI 25.79 kg/m     SVE:     A/P: Pt admitted for monitoring after anesthesia. Had an unsuccessful ECV earlier this morning. On my presentation to the room, she is very uncomfortable, breathing through contractions. On cervical exam she is noted to be 4 cm. Given cervical change from earlier today and painful contractions every 3 minutes s/p unsuccessful ECV discussed our recommendation to move forward with  delivery. Patient is amenable. She desires tubal ligation as well. Consent signed 23. Given her OTC deficiency, will start D10 1/2 NS through delivery.   - contacted genetics/metabolism provider on call  - start D10 1/2 NS   - stat ammonia level, POC glucose  - consent signed  - epidural remains in place, anesthesia plans to use this for her surgery     Nancy Sibley MD  OB/GYN PGY-2  2024 8:56 PM   "

## 2024-01-09 NOTE — PROVIDER NOTIFICATION
01/09/24 0841   Provider Notification   Provider Name/Title Toñito   Method of Notification Electronic Page   Request Evaluate-Remote   Notification Reason Medication Request     Could you please order pt's lexapro for this morning - also pt reports needing to take her Flagyl dose as well.  Call me when you have a moment- thanks!

## 2024-01-09 NOTE — PLAN OF CARE
Goal Outcome Evaluation:      Plan of Care Reviewed With: patient    Overall Patient Progress: no changeOverall Patient Progress: no change    Outcome Evaluation: maternal and fetal well being    Data: Pt came in for an ECV. ECV was unsuccessful and had to be stopped to fetal intolerance of the procedure.Prior to procedure, Contraction irregular, palpate mild. Fetal assessment category one.  Leaking zero amounts of fluid.  Support person Renan present.  Interventions: Continue uterine/fetal assessment continuous. Vital Signs per order set. Comfort measures per order set.  Medicated for none.  Plan: EFM cat 1 for one hour after ECV so pt was moved from the PACU to a labor room to continue monitoring. Observe for 4 hours. If cat 1 EFM and mother stable discharge to home with instructions on when to return. Observe for and notify care provider of indications of progressing labor, need for pain medications, or signs of fetal/maternal compromise.

## 2024-01-09 NOTE — SUMMARY OF CARE
Patient arrived to [facility] unit via zoom cart at 00:35 ,with belongings, accompanied by spouse/ significant other, with infant in arms. Received report from SHAMA Lopez  and checked bands. Unit and room orientation completd. Call light given; no concerns present at this time. Continue with plan of care.

## 2024-01-09 NOTE — PROGRESS NOTES
Obstetrics Postpartum Progress Note  DOS: 24  MRN: [id number]    POD#1 after RLTCS     S: Patient states she is doing well.  Pain well controlled with current pain meds. Lochia less than menses.  Eating and drinking without nausea/vomiting.  She is not yet passing flatus. She has not yet been out of bed since surgery.  Cristobal was just removed this AM and she has not yet been out of bed.  Denies headaches, vision changes, chest pain, SOB.  Breastfeeding. No other acute concerns.   O:  Vitals:    24 0143 24 0250 24 0352 24 0500   BP: 100/83 104/76 107/56 110/67   BP Location: Left arm Left arm Left arm Left arm   Patient Position: Semi-Estrada's Semi-Estrada's Semi-Estrada's Semi-Estrada's   Cuff Size: Adult Regular Adult Regular Adult Regular    Pulse: 83 88 83    Resp: 18 18 18 18   Temp: 98.3  F (36.8  C) 98.5  F (36.9  C) 98.3  F (36.8  C) 98.5  F (36.9  C)   TempSrc: Oral Oral     SpO2:  98% 98% 98%   Weight:       Height:             Gen:  NAD, resting in bed   CV: Regular rate, well perfused  Resp: Nonlabored on room air, normal inspiratory effort  Abd: soft, nondistended, appropriately tender to palpation, fundus firm below umbilicus  Incision:  clean, dry, intact, surgical glue in place  Ext: trace edema    Weight:   Vitals:    24 1021   Weight: 70.3 kg (155 lb)         Labs:  Hemoglobin   Date Value Ref Range Status   2024 8.8 (L) 11.7 - 15.7 g/dL Final   2024 11.6 (L) 11.7 - 15.7 g/dL Final       Rubella Antibody IgG   Date Value Ref Range Status   2023 No detectable antibody.  Final       Assessment and Plan: 28 year old  POD#1 s/p RLTCS, BS doing well postpartum. Pregnancy was complicated by breech presentation s/p unsuccessful ECV, OTC deficiency, anxiety, depression, hep B NI, h/o MACK (in remission), h/o seizure disorder, Rh negative status, rubella non-immune status, desire for permanent sterilization.    Ornithine Transcarbamylase deficiency  -  genetics/metabolism team aware of pts admission and has provided the following recommendations  - ammonia level q8h x24h post-delivery (can then discontinue if normal)  - IV D10 1/2 NS intrapartum, discontinued now that pt is tolerating regular PO   - QID BG     Routine postpartum:  Heme: Hgb 11.6 >  > 8.8  Patient asymptomatic of blood loss anemia. Will discharge home w/ PO iron.  Pain: well-controlled with tylenol, ibuprofen and oxycodone prn, continue.  Rh negative, s/p rhogam at time of ECV, f/up rhogam eval  Rubella non-immune. MMR ordered.  Feeding: breast  :  s/p richardson just before our visit, awaiting void   PPX: Encourage ambulation  Continue regular diet, scheduled bowel regimen, prn antiemetics  Contraception: s/p BTL.    Dispo: Anticipate discharge home PPD#2-3    Nancy Sibley MD  OB/GYN PGY-2  1/9/2024 6:41 AM     Appreciate Dr. Sibley's note above, patient also seen and examined by me. I agree with the note above.   Becky Davila MD

## 2024-01-09 NOTE — PROVIDER NOTIFICATION
01/09/24 1445   Provider Notification   Provider Name/Title Sibley   Method of Notification Electronic Page   Request Evaluate-Remote   Notification Reason Other     Do we need to continue with BGs before meals and at bedtime?  pt is not GDM.

## 2024-01-09 NOTE — ANESTHESIA POSTPROCEDURE EVALUATION
Patient: Myrtle Strickland    Procedure: Procedure(s):  EXTERNAL CEPHALIC VERSION UNDER SPINAL   section       Anesthesia Type:  CSE    Note:  Disposition: Inpatient   Postop Pain Control: Uneventful            Sign Out: Well controlled pain   PONV: No   Neuro/Psych: Uneventful            Sign Out: Acceptable/Baseline neuro status   Airway/Respiratory: Uneventful            Sign Out: Acceptable/Baseline resp. status   CV/Hemodynamics: Uneventful            Sign Out: Acceptable CV status; No obvious hypovolemia; No obvious fluid overload   Other NRE: NONE   DID A NON-ROUTINE EVENT OCCUR? No           Last vitals:  Vitals Value Taken Time   /68 24 1615   Temp     Pulse 113 24 1615   Resp 16 24 1615   SpO2 100 % 24 1615       Electronically Signed By: Germaine Doty MD  2024  12:51 AM

## 2024-01-09 NOTE — PROGRESS NOTES
"  Anesthesia Postpartum  Section with Spinal Anesthesia    Patient: Myrtle Strickland    Patient location: Postpartum floor    Chief complaint: Acute postoperative pain management s/p spinal anesthetic.    Procedure(s) Performed:  Procedure(s):   section  Combined  Section, Salpingectomy Bilateral    Anesthesia type: Spinal Block and Epidural    Subjective  Resting comfortably at this time. Pain adequately controlled by PO medications. Denies pruritis, weakness, paresthesias, difficulties breathing or voiding, headache, nausea, or vomiting. She is able to ambulate and tolerates a regular diet.     Objective  Respiratory Function (RR / SpO2 / Airway Patency): Satisfactory  Cardiac Function (HR / Rhythm / BP): Satisfactory  Strength and sensation lower extremities: Normal  Site of spinal/epidural insertion: No signs of infection or inflammation    Most recent vitals  /67 (BP Location: Left arm, Patient Position: Semi-Estrada's)   Pulse 83   Temp 36.9  C (98.5  F)   Resp 18   Ht 1.651 m (5' 5\")   Wt 70.3 kg (155 lb)   LMP 2023 (Approximate)   SpO2 98%   Breastfeeding Unknown   BMI 25.79 kg/m      Assessment and plan  Myrtle Strickland is a 28 year old female  POD #1 s/p WI EXTERNAL VERSION [16907] (EXTERNAL CEPHALIC VERSION UNDER SPINAL) with intrathecal 0.25% Bupivicaine 2.5mg.  fentanyl (15mcg), and single shot TAP nerve block injections with bupivacaine 0.25% 10mL and long acting liposome bupivacaine (Exparel) 1.3% bilaterally. She is ambulating without difficulty without weakness or paresthesias. There is no evidence of adverse side effects associated with spinal or fascial plane block injections. The patient is receiving adequate incisional pain control at this time and anticipate up to 72 hours of incisional pain control. However, we further anticipate that the patient may require opioid and non-opioid analgesics for visceral and muscle pain that is not controlled with " local anesthetic.      In brief summary, her postoperative analgesia is adequately controlled today. Further interventional analgesic strategies would be of little utility at this time. Thus, we recommend proceeding with PO analgesics including staggered dosing of NSAIDs and acetaminophen with a taper of oxycodone.     Thank you for including us in the care for this patient. If there are any concerns please contact the department of anesthesia OB division ([phone]    Pete Tejeda MD CA-2   Department of Anesthesiology  [phone]

## 2024-01-09 NOTE — ANESTHESIA CARE TRANSFER NOTE
Patient: Myrtle Strickland    Procedure: Procedure(s):   section  Combined  Section, Salpingectomy Bilateral       Diagnosis: Spontaneous breech delivery, single or unspecified fetus [O32.1XX0]  Diagnosis Additional Information: No value filed.    Anesthesia Type:   No value filed.     Note:    Oropharynx: oropharynx clear of all foreign objects  Level of Consciousness: awake  Oxygen Supplementation: room air    Independent Airway: airway patency satisfactory and stable  Dentition: dentition unchanged  Vital Signs Stable: post-procedure vital signs reviewed and stable  Report to RN Given: handoff report given  Patient transferred to: PACU    Handoff Report: Identifed the Patient, Identified the Reponsible Provider, Reviewed the pertinent medical history, Discussed the surgical course, Reviewed Intra-OP anesthesia mangement and issues during anesthesia, Set expectations for post-procedure period and Allowed opportunity for questions and acknowledgement of understanding      Vitals:  Vitals Value Taken Time   /75 24 2234   Temp     Pulse 99 24 2240   Resp 9 24 2240   SpO2 97 % 24 2240   Vitals shown include unfiled device data.    Electronically Signed By: Rashawn Fisher MD  2024  10:41 PM

## 2024-01-09 NOTE — PROVIDER NOTIFICATION
01/08/24 2000   Provider Notification   Provider Name/Title Dr. Sibley   Method of Notification Electronic Page   Request Evaluate in Person   Notification Reason Uterine Activity;Pain     Pt [place] q2-4m and reporting intense pain. Provider requested bedside.

## 2024-01-10 LAB
GLUCOSE BLDC GLUCOMTR-MCNC: 58 MG/DL (ref 70–99)
GLUCOSE BLDC GLUCOMTR-MCNC: 76 MG/DL (ref 70–99)
GLUCOSE BLDC GLUCOMTR-MCNC: 82 MG/DL (ref 70–99)
GLUCOSE BLDC GLUCOMTR-MCNC: 84 MG/DL (ref 70–99)
GLUCOSE BLDC GLUCOMTR-MCNC: 84 MG/DL (ref 70–99)
GLUCOSE BLDC GLUCOMTR-MCNC: 91 MG/DL (ref 70–99)
GLUCOSE BLDC GLUCOMTR-MCNC: 96 MG/DL (ref 70–99)
GLUCOSE BLDC GLUCOMTR-MCNC: 99 MG/DL (ref 70–99)

## 2024-01-10 PROCEDURE — 250N000011 HC RX IP 250 OP 636: Mod: JZ

## 2024-01-10 PROCEDURE — 120N000002 HC R&B MED SURG/OB UMMC

## 2024-01-10 PROCEDURE — 250N000013 HC RX MED GY IP 250 OP 250 PS 637

## 2024-01-10 RX ORDER — LIDOCAINE 4 G/G
2 PATCH TOPICAL
Status: DISCONTINUED | OUTPATIENT
Start: 2024-01-10 | End: 2024-01-11 | Stop reason: HOSPADM

## 2024-01-10 RX ORDER — CYCLOBENZAPRINE HCL 5 MG
5 TABLET ORAL 3 TIMES DAILY PRN
Status: DISCONTINUED | OUTPATIENT
Start: 2024-01-10 | End: 2024-01-11 | Stop reason: HOSPADM

## 2024-01-10 RX ADMIN — DOCUSATE SODIUM 50 MG AND SENNOSIDES 8.6 MG 1 TABLET: 8.6; 5 TABLET, FILM COATED ORAL at 08:42

## 2024-01-10 RX ADMIN — ESCITALOPRAM OXALATE 10 MG: 10 TABLET ORAL at 08:42

## 2024-01-10 RX ADMIN — LIDOCAINE 2 PATCH: 4 PATCH TOPICAL at 08:41

## 2024-01-10 RX ADMIN — IBUPROFEN 800 MG: 800 TABLET, FILM COATED ORAL at 04:44

## 2024-01-10 RX ADMIN — IBUPROFEN 800 MG: 800 TABLET, FILM COATED ORAL at 23:14

## 2024-01-10 RX ADMIN — CYCLOBENZAPRINE HYDROCHLORIDE 5 MG: 5 TABLET, FILM COATED ORAL at 16:28

## 2024-01-10 RX ADMIN — CYCLOBENZAPRINE HYDROCHLORIDE 5 MG: 5 TABLET, FILM COATED ORAL at 08:57

## 2024-01-10 RX ADMIN — HUMAN RHO(D) IMMUNE GLOBULIN 300 MCG: 1500 SOLUTION INTRAMUSCULAR; INTRAVENOUS at 12:44

## 2024-01-10 RX ADMIN — SIMETHICONE 80 MG: 80 TABLET, CHEWABLE ORAL at 02:54

## 2024-01-10 RX ADMIN — ACETAMINOPHEN 975 MG: 325 TABLET, FILM COATED ORAL at 08:41

## 2024-01-10 RX ADMIN — ACETAMINOPHEN 975 MG: 325 TABLET, FILM COATED ORAL at 02:42

## 2024-01-10 RX ADMIN — IBUPROFEN 800 MG: 800 TABLET, FILM COATED ORAL at 16:28

## 2024-01-10 RX ADMIN — IBUPROFEN 800 MG: 800 TABLET, FILM COATED ORAL at 11:24

## 2024-01-10 RX ADMIN — ACETAMINOPHEN 975 MG: 325 TABLET, FILM COATED ORAL at 21:28

## 2024-01-10 RX ADMIN — CYANOCOBALAMIN TAB 1000 MCG 1000 MCG: 1000 TAB at 08:42

## 2024-01-10 RX ADMIN — ACETAMINOPHEN 975 MG: 325 TABLET, FILM COATED ORAL at 14:32

## 2024-01-10 ASSESSMENT — ACTIVITIES OF DAILY LIVING (ADL)
ADLS_ACUITY_SCORE: 18

## 2024-01-10 NOTE — PROGRESS NOTES
Pt declining further BG checks for hypoglycemic episode until after she eats ordered food.  Educated about S/s to call.  BG currently at 86.

## 2024-01-10 NOTE — PLAN OF CARE
Goal Outcome Evaluation: VSS, postpartum assessment WDL.  Fundus firm and below U- light amount of lochia.  Incision MUNDO, abdominal binder in place.  Pt denies any pre-e symptoms.  Ambulating in room independently- denies any dizziness/ lightheadedness with ambulation. HGB this am 8.8. Voiding w/o difficulty, urine dark in color- encouraged pt to increase PO intake.  Tolerating food, +gas- c/o of some gas pains- simethicone administered.  Ammonia levels drawn Q 8- WNL.  Breastfeeding with assistance as well as pumping and hand expressing and producing good amounts of colostrum.  Pt has been taking Toradol and Tylenol for pain- offered Oxycodone and declined due to history of substance abuse.  Participating in  cares- continue with education and POC.

## 2024-01-10 NOTE — PROVIDER NOTIFICATION
01/10/24 1121   Provider Notification   Provider Name/Title VIET Padron   Method of Notification Electronic Page   Request Evaluate-Remote   Notification Reason Other  (Can you please confirm that rhogam should be given.  Saw orders were entered)

## 2024-01-10 NOTE — PROGRESS NOTES
Obstetrics Postpartum Progress Note  DOS: 24  MRN: [id number]    POD#2 after RLTCS     S: patient is doing okay this morning. Still having significant cramping pain, especially with breastfeeding, but present at baseline as well. She is working hard to avoid taking opioids d/t her h/o opioid use disorder. She has been taking tylenol and ibuprofen regularly. She is ambulating without lightheadedness or dizziness. She is tolerating PO without nausea or vomiting. Lochia is minimal. She is breastfeeding. She would like to stay in the hospital another day to work on pain control and is interested in trying non-opioid pain medications.   O:  Vitals:    24 1643 24 2046 01/10/24 0200 01/10/24 0818   BP: 123/72 136/86 113/79 124/75   BP Location: Left arm Left arm Left arm Left arm   Patient Position: Semi-Estrada's Semi-Estrada's Semi-Estrada's Semi-Estrada's   Cuff Size: Adult Regular Adult Regular Adult Regular Adult Regular   Pulse: 75 82 86 71   Resp: 16 16 16 16   Temp: 98.1  F (36.7  C) 98.5  F (36.9  C) 98.4  F (36.9  C) 98.1  F (36.7  C)   TempSrc: Oral Oral Oral Oral   SpO2:       Weight:       Height:         Gen:  NAD, resting in bed   CV: Regular rate, well perfused  Resp: Nonlabored on room air, normal inspiratory effort  Abd: soft, nondistended, appropriately tender to palpation, fundus firm below umbilicus  Incision:  clean, dry, intact, surgical glue in place  Ext: trace edema    Weight:   Vitals:    24 1021   Weight: 70.3 kg (155 lb)     Labs:  Hemoglobin   Date Value Ref Range Status   2024 8.8 (L) 11.7 - 15.7 g/dL Final   2024 11.6 (L) 11.7 - 15.7 g/dL Final       Rubella Antibody IgG   Date Value Ref Range Status   2023 No detectable antibody.  Final       Assessment and Plan: 28 year old  POD#2 s/p RLTCS, BS doing well postpartum. Pregnancy was complicated by breech presentation s/p unsuccessful ECV, OTC deficiency, anxiety, depression, hep B NI, h/o MACK (in  remission), h/o seizure disorder, Rh negative status, rubella non-immune status, desire for permanent sterilization.    Ornithine Transcarbamylase deficiency  - genetics/metabolism team aware of pts admission and has provided the following recommendations  - ammonia level q8h x24h post-delivery remained normal, not planning any further ammonia checks   - IV D10 1/2 NS intrapartum, discontinued now that pt is tolerating regular PO   - QID BG     Routine postpartum:  Heme: Hgb 11.6 >  > 8.8  Patient asymptomatic of blood loss anemia. Will discharge home w/ PO iron.  Pain: moderate controlled with tylenol, ibuprofen. Pt is avoiding oxycodone given h/o OUD. Will add lidocaine patches and PRN flexeril today and continue to re-assess. If ongoing pain despite these adjuncts could consider PO dilaudid after discussion with patient.   Rh negative, planning to give rhogam PTD   Rubella non-immune. MMR ordered to be given prior to discharge.   Feeding: breast  :  s/p richardson, voiding spontaneously   PPX: Encourage ambulation  Continue regular diet, scheduled bowel regimen, prn antiemetics  Contraception: s/p BTL.    Dispo: Anticipate discharge home this PM or tomorrow    Nancy Sibley MD  OB/GYN PGY-2  1/10/2024 8:04 AM     [facility] staff:  Appreciate note by Dr. Sibley.  I have seen and examined the patient without the resident. I have reviewed, edited, and agree with the note.  Await result of additional pain mgmt adjuncts.  Consider discharge later today if well controlled.   Given neg fetal bleeding screen on POD 1, s/p full rhogam 2-4 hours prior to delivery.  Discussed with blood bank on 1/10/24- given the above constellation of circumstances, recommend repeat rhogam.     Sahra Worrell MD, FACOG  1/10/2024  2:28 PM

## 2024-01-10 NOTE — PLAN OF CARE
Pt VSS, postpartum assessment WDL.  Pain managed with tylenol, ibuprofen, and PRN flexeril.  Pt is bonding well with baby and breastfeeding Q2-3 hrs.  C/s incision MUNDO with liquid bandage, healing well.  Pt is ambulating independently, voiding spontaneously, and tolerating regular diet.  Continue with plan of care.

## 2024-01-10 NOTE — CONSULTS
"This note was copied from baby's chart    Social Work Initial Consult     DATA/ASSESSMENT     General Information  Assessment completed with: Parents, La  Type of visit: Psychosocial Assessment      Reason for Consult: financial concerns, insurance concerns     Living Environment:   Primary caregiver:    Lives with: mother, father, brother         Current living arrangements:            Able to return to prior arrangements: yes     Family Factors  Family Risk Factors: limited financial resources, maternal mental health history or concerns  Family Strength Factors:  experienced parents, supportive partner     Assessment of Support  Parental Marital Status: Lives with Significant Other   Description of Support System: Involved     Employment/Financial  Patient's caregiver works full/part time: Yes     Patient works full/part time: No        Coping/Stress  Sources of Support: significant other, parent(s), friend(s)       Understanding of Condition and Treatment: adequate understanding of medical condition, adequate understanding of treatment      Additional Information:  This writer met with parents in their [facility] room.  Christiana was born via  on Monday.  Myrtle reports this is the only of her 3 children born at term.  La live in [place] with their son Mateo who is 18 mo old.  Myrtle reports they did not have time to get a car seat for Christiana as she was \"born early\"  Parents do have a car seat for Mateo which they can use for discharge but were hoping to get a second car seat.  This writer provided community resources for baby supplies. Parents can also get a medical ride home if needed on the day of discharge.    Myrtle was given the number for [facility] [phone] as well as Financial Counseling if she needs help adding baby to insurance.  Myrtle also reports she needs to call [facility] to ask about a breast pump as she got one less than two years ago when she " "had Mateo.  Myrtle reports she can make these phone calls it is just hard and she feels overwhelmed after giving birth.     Myrtle has a significant history of mental health concerns including diagnoses of ADHD, anxiety, depression, and borderline personality disorder.  She is taking Lexapro and is seeing a psychiatrist both of which have been helpful.  She reports she has occasional suicidal thoughts but not recently and she would never act on these thoughts.  She reports her current symptoms of anxiety and depression are high but this is her baseline.  She has an appointment with her psychiatrist and is comfortable reaching out if needed sooner.     Myrtle and Renan were vague about their financial needs.  Renan works \"doing odd jobs\" and is able to support their family.  They reported several times they can afford resources they just \"ran out of time\" They report they get significant support from family and friends but feel overwhelmed much of the time.       INTERVENTION  Conducted chart review and consulted with medical team regarding plan of care. Introduced SW role and scope of practice.      Provided assessment of patient and family's level of coping  Conducted psychosocial assessment   Validated emotions and provided supportive listening  Facilitated service linkage with hospital and community resources     Provided SW contact info     PLAN     SW will continue to follow for supportive intervention.     Janeth BANKSW, MSW, [facility] Center[facility]  Maternal Child Health   [phone]--office  [phone]--pager   "

## 2024-01-10 NOTE — PROVIDER NOTIFICATION
01/10/24 0811   Provider Notification   Provider Name/Title VIET Padron   Method of Notification Electronic Page   Request Evaluate-Remote   Notification Reason Other  (Pt received rhogam 1/8 1731 right before delivery.  Can you please clarify if she needs another dose of rhogam today?)     Per Dr Worrell in person - they will look into it.  Hold off on Rhogam for now

## 2024-01-10 NOTE — PLAN OF CARE
AFVSS. Postpartum assessments WDL. Patient is ambulating and voiding without difficulty. She is passing flatus, has not yet had BM. Incision with liquid bandage, open to air, no s/s infection noted. States incisional and cramping pain controlled with scheduled ibuprofen and tylenol, and warm packs to abdomen. Patient is breastfeeding with minimal assistance for positioning. She is working on birth certificate and EDS. Will continue to provide support and education as needed. Continue present cares.

## 2024-01-10 NOTE — LACTATION NOTE
This note was copied from a baby's chart.  Consult for: Early Term Infant    Infant Name: Christiana    Infant's Primary Care Clinic: unsure    Delivery Information:  Christiana was born at Gestational Age: 37w2d via   delivery on 2024 9:32 PM     Maternal Health History:    Information for the patient's mother:  EmGuyangus RECINOS [[id number]]     Patient Active Problem List   Diagnosis    Asperger's disorder    Attention deficit hyperactivity disorder (ADHD)    Benign joint hypermobility    Conversion disorder with seizures or convulsions    Developmental expressive writing disorder    History of asthma    Orthostatic hypotension    Psychogenic nonepileptic seizure    Severe major depression without psychotic features (H)    Mixed anxiety and depressive disorder    Seizures (H)    Migraine    Major depressive disorder, recurrent episode, moderate (H)    Uncomplicated asthma    Chronic back pain    History of anemia    History of premature delivery    OTC (ornithine transcarbamylase deficiency) (H24)    Tobacco use disorder    Anxiety    Rubella non-immune status, antepartum    Rh negative state in antepartum period    History of substance use disorder    ADHD (attention deficit hyperactivity disorder)    History of eating disorder    Bipolar disorder (H)    Hyperemesis gravidarum    High-risk pregnancy, unspecified trimester    History of  delivery    Maternal varicella, non-immune    Nonimmune to hepatitis B virus    Vitamin D deficiency    Breech presentation    Abnormal vaginal fluids    Iron deficiency anemia secondary to inadequate dietary iron intake    Bacterial vaginosis in pregnancy    Encounter for triage in pregnant patient    S/P repeat low transverse       Lexapro 10mg daily - L2 per Infant Risk, watch for sedation or irritabilty, not waking to feed/poor feeding, and weight gain    Maternal Breast Exam:  Myrtle noted breast growth and sensitivity in early pregnancy. She denies any  "history of breast/chest injury or surgery. Her breasts are soft and symmetrical with bilateral intact nipples. She has been able to hand express colostrum. ?    Breastfeeding/ Lactation History:  her oldest child and had a large oversupply.  Shared that she was pumping regularly for 30 minutes at a time because she was trying to \"pump to her comfort\" as directed.  This started when she was engorged and continued throughout the time she  her daughter.  Her 2nd child she felt like she didn't have enough milk, but he was in the NICU and he got off to a rough start with breastfeeding.      Oral exam of baby:  Christiana has mildly recessed jaw, normal palate, good length of tongue beyond lingual frenulum attachment, good extension well beyond gum ridge & organized when sucking on finger.     Infant information: Christiana has age appropriate output and weight loss.      Weight Change Since Birth: <24 hours old at the time of consult    Feeding History: Christiana has had difficulties with a sustained latch and she was started with a nipple shield at some point during the day today.  She has been getting bottles of pumped/hand expressed colostrum.    Myrtle stated to her nurse that she thinks Christiana has acid reflux.  She is also worried that she's not getting enough when she's pumping because she only got about 5 mls with most recent pump session.    Feeding Assessment: with some assist for holding and positioning from , Christiana was able to latch with a nipple shield while in cross-cradle position on the left breast.  Myrtle needed some re-direction during the feeding to help with a more firm hold and a reminder that she needs to help bring Christiana to the breast and support her throughout the feeding.  Myrtle shared that it feels very harsh to pull the baby to the breast so quickly and she worries that it is bad for Christiana.  Discussed that helping Christiana to open wide and to get the nipple/shield in her mouth " when it is widest and so the nipple can go deep into her mouth is important/helpful for helping her to stimulate her suck reflex so she can start sucking and stay on the breast.      After about 5 minutes at the breast, Christiana needed frequent stimulation and additional physical support that Myrtle wasn't able to provide at the time.  She then gave her ~6mls of pumped breast milk via bottle.     Education:   [x] Potential feeding challenges of early term infants  [x] Expected  feeding patterns in the first few days (pg. 38 of Your Guide to To Postpartum and Taylor Care)/ the Second Night  [x] Stages of milk production  [x] Benefits of hand expression of colostrum  [x] Early feeding cues   [x] Feeding frequency- encourage at least every 3 hours.     [x] Benefits of feeding on cue  [x] Benefits of skin to skin  [x] Breastfeeding positions  [x] Tips to get and maintain a deep latch  [x] Nutritive vs.non-nutritive sucking  [x] Gentle breast compressions as needed to enhance milk transfer  [x] How to tell when baby is finished  [x] How to tell if baby is getting enough  [x] Expected  output  [x] Taylor weight loss  [x] Infant Feeding Log  [x] [employer] Breastfeeding/Pumping videos  [x] Signs breastfeeding is going well (comfortable latch, audible swallows, age appropriate output and weight loss)    [x] Tips to prevent engorgement  [x] Signs of engorgement  [x] Tips to manage engorgement  [x] Hand expression and pumping recommendations  [x] Supplement recommendations   [x] Satiety cues   [x] CDC breast pump part/infant feeding supplies cleaning recommendations  [x] Inpatient breastfeeding support  [] Outpatient lactation resources and follow up recommendations    Handouts: Infant Feeding Log (Week 1, Your Guide to Postpartum & Taylor Care Book)    Home Breast Pump: not addressed     Plan (Early Term): Frequent skin to skin, watch for early feeding cues and breastfeed on cue with goal of 8 to 12  "feedings in 24 hours. Go no longer than 3 hours between feedings. Encourage breast compressions to enhance milk transfer when infant at the breast.    Encourage hand expression after feedings until milk is in, and hands on pumping 4-6 times daily using 15 minute initiate setting on symphony to support \"full term\" supply. Supplement after breastfeeding with any expressed milk.     Follow up with outpatient lactation consultant within a week of discharge for support with early term infant, and  weaning from pumping after supply established. Family is unsure where they will follow-up.    Patient will need reinforcement of teaching topics prior to discharge.      Leana Sanabria RN, IBCLC   Lactation Consultant  Ascom: *[phone]  Office: [phone]      "

## 2024-01-10 NOTE — PROVIDER NOTIFICATION
01/10/24 1346   Provider Notification   Provider Name/Title G3 Dr GRISEL Padron   Method of Notification Electronic Page   Request Evaluate-Remote   Notification Reason Other       Can u pls enter the hypoglycemia order set. Pt having low BGs and we need order set ASAP to access meds etc thank you.     Per Dr Mcdaniels- continue treating with apple juice   -- DO NOT REPLY / DO NOT REPLY ALL --  -- Message is from Engagement Center Operations (ECO) --    General Patient Message: Patient called and stated that he went to complete his MRI this past weekend but states that he was told that the order needs to be corrected to states weather it is with contrast or with out. Caller provided phone  Number to contact MRI: [phone] Maria Luz.     Please correct order and confirm with patient.      Caller Information       Type Contact Phone/Fax    09/18/2023 10:12 AM CDT Phone (Incoming) Kingston Antoine (Self) [phone] (M)        Alternative phone number: [phone]    Can a detailed message be left? Yes    Message Turnaround:     Is it Working Hours? Yes - Working Hours     [place]:    Please give this turnaround time to the caller:   \"This message will be sent to [state Provider's name]. The clinical team will fulfill your request as soon as they review your message.\"

## 2024-01-11 VITALS
TEMPERATURE: 98.6 F | HEIGHT: 65 IN | DIASTOLIC BLOOD PRESSURE: 70 MMHG | BODY MASS INDEX: 25.47 KG/M2 | SYSTOLIC BLOOD PRESSURE: 113 MMHG | RESPIRATION RATE: 16 BRPM | WEIGHT: 152.9 LBS | OXYGEN SATURATION: 100 % | HEART RATE: 78 BPM

## 2024-01-11 LAB
PATH REPORT.COMMENTS IMP SPEC: NORMAL
PATH REPORT.COMMENTS IMP SPEC: NORMAL
PATH REPORT.FINAL DX SPEC: NORMAL
PATH REPORT.GROSS SPEC: NORMAL
PATH REPORT.MICROSCOPIC SPEC OTHER STN: NORMAL
PATH REPORT.RELEVANT HX SPEC: NORMAL
PHOTO IMAGE: NORMAL

## 2024-01-11 PROCEDURE — 250N000013 HC RX MED GY IP 250 OP 250 PS 637

## 2024-01-11 RX ORDER — OXYCODONE HYDROCHLORIDE 5 MG/1
5 TABLET ORAL EVERY 6 HOURS PRN
Qty: 12 TABLET | Refills: 0 | Status: SHIPPED | OUTPATIENT
Start: 2024-01-11 | End: 2024-01-14

## 2024-01-11 RX ORDER — CYCLOBENZAPRINE HCL 5 MG
5 TABLET ORAL 3 TIMES DAILY PRN
Qty: 30 TABLET | Refills: 0 | Status: SHIPPED | OUTPATIENT
Start: 2024-01-11

## 2024-01-11 RX ADMIN — ESCITALOPRAM OXALATE 10 MG: 10 TABLET ORAL at 08:50

## 2024-01-11 RX ADMIN — CYCLOBENZAPRINE HYDROCHLORIDE 5 MG: 5 TABLET, FILM COATED ORAL at 11:07

## 2024-01-11 RX ADMIN — IBUPROFEN 800 MG: 800 TABLET, FILM COATED ORAL at 04:42

## 2024-01-11 RX ADMIN — CYCLOBENZAPRINE HYDROCHLORIDE 5 MG: 5 TABLET, FILM COATED ORAL at 00:55

## 2024-01-11 RX ADMIN — IBUPROFEN 800 MG: 800 TABLET, FILM COATED ORAL at 11:07

## 2024-01-11 RX ADMIN — ACETAMINOPHEN 975 MG: 325 TABLET, FILM COATED ORAL at 11:07

## 2024-01-11 RX ADMIN — CYANOCOBALAMIN TAB 1000 MCG 1000 MCG: 1000 TAB at 08:50

## 2024-01-11 RX ADMIN — ACETAMINOPHEN 975 MG: 325 TABLET, FILM COATED ORAL at 04:42

## 2024-01-11 ASSESSMENT — ACTIVITIES OF DAILY LIVING (ADL)
ADLS_ACUITY_SCORE: 18

## 2024-01-11 NOTE — PLAN OF CARE
Goal Outcome Evaluation:         Vital signs stable. Postpartum assessment WDL. Incision has liquid bandage.. Pain controlled with tylenol and ibuprofen . Patient ambulating with  independently. Pt has bowel movement. Pt is breast feeding, breast pump and donor milk feeding.   Pt is agreeable with her plan of care. Positive attachment behaviors observed with infant. Support person present. Will continue with plan of care.    Goal Outcome Evaluation:       Plan of Care Reviewed With: patient  Overall Patient Progress: improving

## 2024-01-11 NOTE — LACTATION NOTE
This note was copied from a baby's chart.  Consult for:  early term infant     Infant Name: Christiana    Infant's Primary Care Clinic: unknown    Delivery Information:  Christiana was born at Gestational Age: 37w2d via   delivery on 2024 9:32 PM     Maternal Health History:  ( Maternal past medical history, problem list and prior to admission medications reviewed and unremarkable., Maternal past medical history, problem list and prior to admission medications reviewed and notable for ,   Information for the patient's mother:  EmMyrtle [[id number]]     Past Medical History:   Diagnosis Date    ADHD (attention deficit hyperactivity disorder)     Anxiety     Anxiety disorder 10/15/2008    Asperger's disorder 2009    Attention deficit hyperactivity disorder (ADHD) 10/15/2008    Formatting of this note might be different from the original.  LW Onset:  [id number]    Benign joint hypermobility 2010    Bipolar disorder (H)      delivery delivered 2022    Chronic back pain 10/16/2023    Conversion disorder with seizures or convulsions 2019    Depressive disorder     Developmental expressive writing disorder 2009    Headache 2023    History of anemia 10/16/2023    History of asthma 2013    History of eating disorder     History of premature delivery 10/16/2023    07/01/19: PTD@34.4wks, PPROM, @ [facility],  Q trimester both previous pregnancies at 34 wks, [facility] referral ordered    History of substance use     opiate    History of substance use disorder 10/16/2023    In remission. Hx of relapse after last delivery. Avoid narcotics in labor and delivery.     Hyperemesis gravidarum 10/17/2023    Major depressive disorder, recurrent episode, moderate (H) 2023    Migraine 2023    Mixed anxiety and depressive disorder 10/05/2022    started on sertraline 50mg at 8wks started on sertraline 50mg at 8wks    Orthostatic hypotension 2011    OTC (ornithine  "transcarbamylase deficiency) (H24)     family hx     delivery 2019    Psychogenic nonepileptic seizure     Rh negative state in antepartum period 10/16/2023    Needs rhogam at 28 weeks    Rubella non-immune status, antepartum     Seizures (H) 2019    no meds, last seizure 2022, had neuro referral with last pregnancy and was told it was \"anxiety induced\"- she wants to treat with sertraline only. no meds, last seizure 2022, referral sent to neuro, possible seizure 22, has neuro appt 22--feels anxiety induced and wants to treat with sertraline only. PER GUIDELINES - does not qualify for CNM care    Severe major depression without psychotic features (H) 2010    Syncope and collapse 2008    Formatting of this note might be different from the original. LW Modifier:  3/10/08 ; Syncope Formatting of this note might be different from the original. Formatting of this note might be different from the original. LW Modifier:  3/10/08 ; Syncope    Tobacco use disorder 2011    Formatting of this note might be different from the original. Tobacco Abuse Formatting of this note might be different from the original. Formatting of this note might be different from the original. Tobacco Abuse    Uncomplicated asthma     no inhaler use since high school    Vaginal bleeding before 22 weeks gestation 2023    ,   Information for the patient's mother:  Myrtle Strickland [[id number]]     Patient Active Problem List   Diagnosis    Asperger's disorder    Attention deficit hyperactivity disorder (ADHD)    Benign joint hypermobility    Conversion disorder with seizures or convulsions    Developmental expressive writing disorder    History of asthma    Orthostatic hypotension    Psychogenic nonepileptic seizure    Severe major depression without psychotic features (H)    Mixed anxiety and depressive disorder    Seizures (H)    Migraine    Major depressive disorder, recurrent episode, moderate " (H)    Uncomplicated asthma    Chronic back pain    History of anemia    History of premature delivery    OTC (ornithine transcarbamylase deficiency) (H24)    Tobacco use disorder    Anxiety    Rubella non-immune status, antepartum    Rh negative state in antepartum period    History of substance use disorder    ADHD (attention deficit hyperactivity disorder)    History of eating disorder    Bipolar disorder (H)    Hyperemesis gravidarum    High-risk pregnancy, unspecified trimester    History of  delivery    Maternal varicella, non-immune    Nonimmune to hepatitis B virus    Vitamin D deficiency    Breech presentation    Abnormal vaginal fluids    Iron deficiency anemia secondary to inadequate dietary iron intake    Bacterial vaginosis in pregnancy    Encounter for triage in pregnant patient    S/P repeat low transverse     ,   Information for the patient's mother:  Myrtle Strickland [[id number]]     Facility-Administered Medications Prior to Admission   Medication Dose Route Frequency Provider Last Rate Last Admin    lidocaine (LMX4) kit   Topical Q1H PRN Regina Cordoba MD        lidocaine 1 % 0.1-1 mL  0.1-1 mL Other Q1H PRN Regina Cordoba MD        sodium chloride (PF) 0.9% PF flush 3 mL  3 mL Intracatheter Q8H Regina Cordoba MD        sodium chloride (PF) 0.9% PF flush 3 mL  3 mL Intracatheter q1 min prn Regina Cordoba MD        terbutaline (BRETHINE) injection 0.25 mg  0.25 mg Subcutaneous Once Regina Cordoba MD         Medications Prior to Admission   Medication Sig Dispense Refill Last Dose    cyanocobalamin (VITAMIN B-12) 1000 MCG tablet Take 1 tablet (1,000 mcg) by mouth daily 90 tablet 2 2024    ferrous sulfate (FEROSUL) 325 (65 Fe) MG tablet Take 1 tablet (325 mg) by mouth daily (with breakfast) 90 tablet 2 2024    hydrOXYzine vikki (VISTARIL) 25 MG capsule Take 1 capsule (25 mg) by mouth 3 times daily as needed for itching 90 capsule 0 2024    metroNIDAZOLE  "(FLAGYL) 500 MG tablet Take 1 tablet (500 mg) by mouth 2 times daily for 7 days 14 tablet 0 More than a month    vitamin C (ASCORBIC ACID) 250 MG tablet Take 1 tablet (250 mg) by mouth daily 90 tablet 2 1/7/2024    escitalopram (LEXAPRO) 10 MG tablet Take 1 tablet (10 mg) by mouth daily for 60 days 30 tablet 1     , Maternal complications: use of Lexapro      Maternal Breast Exam:  Myrtle noted breast growth and sensitivity in early pregnancy. She denies any history of breast/chest injury or surgery. Her breasts are soft and symmetrical with bilateral intact, everted nipples. She has been able to hand express colostrum and is pumping about 4 or 5 mL with each pumping session.. ?    Breastfeeding/ Lactation History: Oversupply with first infant, second infant had tongue tie and Myrtle's milk volume never came in the same amount as her first child's.    Infant information: Christiana was AGA (at 11% on growth chart) at birth and has age appropriate output and weight loss.      Weight Change Since Birth: -7% at 2 day old     Oral exam of baby:  Christiana has normal jaw, normal to slightly higher arched palate, good length of tongue beyond lingual frenulum attachment, extension well beyond gum ridge & organized at times but also chewing when sucking on finger.     Feeding Assessment: Christiana has been at breast, Myrtle has been using a size 24 mm nipple shield (good fit for Myrtle) but Christiana has been \"chewing\" when at the breast at times or a lot of time per Myrtle and per Myrtle \"doesn't like the nipple shield\". We first latched with a size 24 mm shield and Christiana latched but after a couple of sucks, Myrtle was in pain and making painful expressions with her face. We discussed maybe trying a smaller shield for Christiana since she hadn't liked the shield, and comes off and didn't latch again. Myrtle agreed, and a size 20 mm was then used on nipple. Christiana easily grasped onto the shield and had about 4-5 sucks in a row, with fair " "amount of vigor and rythmic sucks. Myrtle again, made painful expressions and when asked if the feeding was painful said \"yes\" and when asked about removing the infant from the breast Myrtle agreed. Christiana was given to her Father and held while the pump was set up for Myrtle at which she pumped using the size 24 mm flanges, 5 mL was expressed. 27 mm flanges were located and given to Myrtle for the next pumping session as she stated the pumping was a bit sore and she used larger flanges for pumping with her previous infants. After returning to room with donor milk, infant was fed by her Father 5 mL of fresh expressed maternal milk with a slow flow nipple / bottle and 10 mL of donor milk. Christiana quickly and easily took this feeding. Christiana also had a wet and stool in her diaper which was changed. Swadled and left with Father after visit. Christiana has her own nipple cream from home she has been using. Nipples are raw and reddened at tips of both nipples.      Education:   [x] Expected  feeding patterns in the first few days (pg. 38 of Your Guide to To Postpartum and  Care)/ the Second Night  [x] Stages of milk production  [x] Benefits of hand expression of colostrum  [x] Early feeding cues     [x] Benefits of feeding on cue  [x] Benefits of skin to skin  [x] Breastfeeding positions    [x] Pumping recommendations (based on patient need)  [x] [facility] breast pump part/infant feeding supplies cleaning recommendations      Handouts: Infant Feeding Log (Week 1, Your Guide to Postpartum &  Care Book)    Home Breast Pump: will need a pump either from us (her son is now 2 years and it has been 2 years since she had a pump for him (used a Spectra) but had lost the pump during their move. Myrtle should call her insurance company (states it is Blue [employer] / Blue Shield through the state [facility]) to see if they cover another pump so close or if they have a specific distribution procedure for pumps. Encouraged " "Myrtle to call insurance and find out first before getting a pump to go home with.    Plan (Early Term):  Frequent skin to skin, watch for early feeding cues and breastfeed on cue with goal of 8 to 12 feedings in 24 hours. Go no longer than 3 hours between feedings. Encourage breast compressions to enhance milk transfer when infant at the breast.     Encourage hand expression after feedings until milk is in, and hands on pumping 4-6 times daily using 15 minute initiate setting on symphony to support \"full term\" supply. Supplement after breastfeeding with any expressed milk.     Continue breastfeeding on cue with RN support as needed with a goal of 8-12 feedings per day.     Use nipple shield with each feeding until Myrtle's nipples are less sore (start with 24 mm if infant can tolerate that first as it fits better for Myrtle. Her nipples barely fit into the size 20 mm even though Calcium likes that size better, try to keep using a size 24 mm shield)    Encourage frequent skin to skin and hand expression.     IF too sore to have infant at breast, go straight to hand expressing / pumping and supplementation of infant with any maternal milk available and donor milk for total volume of 15 mL unless ordered different from infant's Pediatrician. Once infant is more than 48 hours, she can have more than 15 mL, up to 20 mL and potentially 25 mL / feeding over the next 24 hours unless ordered different by Pediatrician. Infant is a risk for weight loss due to her small weight at birth (just making AGA cut off) and her gestation age along with \"chewing\" at breast.    Make sure Myrtle has nipple cream after every pumping session (this is what she wants to be using, her cream from home).    Encouraged follow up with outpatient lactation consultant  within 1 week after discharge. Family plans to follow up with primary care clinic at discharge (clinic was not discussed during visit).        Pina Ceja RN, IBCLC   Lactation " Consultant  Ascom: *[phone]  Office: [phone]

## 2024-01-11 NOTE — LACTATION NOTE
This note was copied from a baby's chart.  Brief Lactation Consult    Myrtle shares that she has not been latching infant due to nipple pain. Declined latching support.    She plans to pump and bottle. She is undecided about what she will supplement with at home until her milk supply increases. She was given handout on how to purchase donor milk from the [facility]. She may use formula.    Myrtle had not been pumping each time infant was bottle fed as she was afraid of creating an oversupply. Reviewed the necessity of emptying her breasts frequently (with each feeding) whether by latching infant or pumping.       Education: donor milk resources, pumping frequency, breast pumps for home use and outpatient lactation support.       Plan: Myrtle plans to check with insurance for pump coverage. She got a pump < 2 years ago  but lost the pump. Reviewed pumps available if covered by insurance or retail pump sources if needing to purchase her pump.        Gypsy Stewart RN, IBCLC   Lactation Consultant  Ascom: *[phone]  Office: [phone]

## 2024-01-11 NOTE — PROVIDER NOTIFICATION
01/11/24 1209   Provider Notification   Provider Name/Title SW- Janeth   Method of Notification Electronic Page   Request Evaluate in Person   Notification Reason Other     EDPS= 19; Q#10= 1. Pt is discharging today. THanks

## 2024-01-11 NOTE — PLAN OF CARE
Goal Outcome Evaluation:  Patient is stable and pain is well managed with current pain regime. She's been pumping every other feedings and with last pump she got 20 mls. Her EDPS was at 19 and question #10 she answered 1. Contacted provider and SW order placed. Spoke with SW and she said they talked about it yesterday re: psychological challenges. Breast pump given per her request and aware about  paying it out on her pocket if insurance does not pay.  Discharge education and instruction reviewed and verbalized understanding. Home with baby today.

## 2024-01-11 NOTE — PROVIDER NOTIFICATION
01/11/24 1205   Provider Notification   Provider Name/Title G2- Harris   Method of Notification Electronic Page   Request Evaluate-Remote   Notification Reason Other  (EDPS= 19)

## 2024-01-11 NOTE — PLAN OF CARE
Problem: Postpartum ( Delivery)  Goal: Optimal Pain Control and Function  Outcome: Progressing  Intervention: Prevent or Manage Pain  Recent Flowsheet Documentation  Taken 1/10/2024 1630 by Sandra Lay RN  Pain Management Interventions: medication (see MAR)   VSS, pain managed with scheduled medications. PRN Flexeril given once for pain and cramping, patient stated medication was helpful. Patient is OOB frequently, ambulates well. Voiding spontaneously. Encouraged food and fluid intake. Incision MUNDO with liquid bandage, no s/s of infection noted. Patient is bonding well with infant, attentive to infant's cues. Endorsed some pain and discomfort while breastfeeding, lactation will be evaluating next feed at 18:30. Will continue to assess.

## 2024-01-11 NOTE — CONSULTS
This writer discussed mental health history and support plan in psychosocial assessment. (See SW note from yesterday)     Janeth Prasad  DSW, MSW, [facility]  Maternal Child Health   [phone]--office  [phone]--pager

## 2024-01-11 NOTE — DISCHARGE INSTRUCTIONS
Warning Signs after Having a Baby    Keep this paper on your fridge or somewhere else where you can see it.    Call your provider if you have any of these symptoms up to 12 weeks after having your baby.    Thoughts of hurting yourself or your baby  Pain in your chest or trouble breathing  Severe headache not helped by pain medicine  Eyesight concerns (blurry vision, seeing spots or flashes of light, other changes to eyesight)  Fainting, shaking or other signs of a seizure    Call 9-1-1 if you feel that it is an emergency.     The symptoms below can happen to anyone after giving birth. They can be very serious. Call your provider if you have any of these warning signs.    My provider s phone number: _______________________    Losing too much blood (hemorrhage)    Call your provider if you soak through a pad in less than an hour or pass blood clots bigger than a golf ball. These may be signs that you are bleeding too much.    Blood clots in the legs or lungs    After you give birth, your body naturally clots its blood to help prevent blood loss. Sometimes this increased clotting can happen in other areas of the body, like the legs or lungs. This can block your blood flow and be very dangerous.     Call your provider if you:  Have a red, swollen spot on the back of your leg that is warm or painful when you touch it.   Are coughing up blood.     Infection    Call your provider if you have any of these symptoms:  Fever of 100.4 F (38 C) or higher.  Pain or redness around your stitches if you had an incision.   Any yellow, white, or green fluid coming from places where you had stitches or surgery.    Mood Problems (postpartum depression)    Many people feel sad or have mood changes after having a baby. But for some people, these mood swings are worse.     Call your provider right away if you feel so anxious or nervous that you can't care for yourself or your baby.    Preeclampsia (high blood pressure)    Even if you  didn't have high blood pressure when you were pregnant, you are at risk for the high blood pressure disease called preeclampsia. This risk can last up to 12 weeks after giving birth.     Call your provider if you have:   Pain on your right side under your rib cage  Sudden swelling in the hands and face    Remember: You know your body. If something doesn't feel right, get medical help.     For informational purposes only. Not to replace the advice of your health care provider. Copyright 2020 [facility]. All rights reserved. Clinically reviewed by Aicha Dang, RNC-OB, MSN. Net[employer] 244487 - Rev .     Section: What to Expect at Home  Your Recovery     A  section, or , is surgery to deliver your baby through a cut that the doctor makes in your lower belly and uterus. The cut is called an incision.  You may have some pain in your lower belly and need pain medicine for 1 to 2 weeks. You can expect some vaginal bleeding for several weeks. You will probably need about 6 weeks to fully recover.  It's important to take it easy while the incision heals. Avoid heavy lifting, strenuous activities, and exercises that strain the belly muscles while you recover. Ask a family member or friend for help with housework, cooking, and shopping.  This care sheet gives you a general idea about how long it will take for you to recover. But each person recovers at a different pace. Follow the steps below to get better as quickly as possible.  How can you care for yourself at home?  Activity    Rest when you feel tired. Getting enough sleep will help you recover.     Try to walk each day. Start by walking a little more than you did the day before. Bit by bit, increase the amount you walk. Walking boosts blood flow and helps prevent pneumonia, constipation, and blood clots.     Avoid strenuous activities, such as bicycle riding, jogging, weightlifting, and aerobic exercise, for 6 weeks or until  your doctor says it is okay.     Until your doctor says it is okay, do not lift anything heavier than your baby.     Do not do sit-ups or other exercises that strain the belly muscles for 6 weeks or until your doctor says it is okay.     Hold a pillow over your incision when you cough or take deep breaths. This will support your belly and decrease your pain.     You may shower as usual. Pat the incision dry when you are done.     You will have some vaginal bleeding. Wear sanitary pads. Do not douche or use tampons until your doctor says it is okay.     Ask your doctor when you can drive again.     You will probably need to take at least 6 weeks off work. It depends on the type of work you do and how you feel.     Ask your doctor when it is okay for you to have sex.   Diet    You can eat your normal diet. If your stomach is upset, try bland, low-fat foods like plain rice, broiled chicken, toast, and yogurt.     Drink plenty of fluids (unless your doctor tells you not to).     You may notice that your bowel movements are not regular right after your surgery. This is common. Try to avoid constipation and straining with bowel movements. You may want to take a fiber supplement every day. If you have not had a bowel movement after a couple of days, ask your doctor about taking a mild laxative.     If you are breastfeeding, limit alcohol. Alcohol can cause a lack of energy and other health problems for the baby when a breastfeeding woman drinks heavily. It can also get in the way of a mom's ability to feed her baby or to care for the child in other ways. There isn't a lot of research about exactly how much alcohol can harm a baby. Having no alcohol is the safest choice for your baby. If you choose to have a drink now and then, have only one drink, and limit the number of occasions that you have a drink. Wait to breastfeed at least 2 hours after you have a drink to reduce the amount of alcohol the baby may get in the milk.    Medicines    Your doctor will tell you if and when you can restart your medicines. You will also get instructions about taking any new medicines.     If you stopped taking aspirin or some other blood thinner, your doctor will tell you when to start taking it again.     Take pain medicines exactly as directed.  If the doctor gave you a prescription medicine for pain, take it as prescribed.  If you are not taking a prescription pain medicine, ask your doctor if you can take an over-the-counter medicine.     If you think your pain medicine is making you sick to your stomach:  Take your medicine after meals (unless your doctor has told you not to).  Ask your doctor for a different pain medicine.     If your doctor prescribed antibiotics, take them as directed. Do not stop taking them just because you feel better. You need to take the full course of antibiotics.   Incision care    If you have strips of tape on the incision, leave the tape on for a week or until it falls off.     Wash the area daily with warm, soapy water, and pat it dry. Don't use hydrogen peroxide or alcohol, which can slow healing. You may cover the area with a gauze bandage if it weeps or rubs against clothing. Change the bandage every day.     Keep the area clean and dry.   Other instructions    If you breastfeed your baby, you may be more comfortable while you are healing if you don't rest your baby on your belly. Try tucking your baby under your arm, with your baby's body along the side you will be feeding on. Support your baby's upper body with your arm. With that hand you can control your baby's head to bring your baby's mouth to your breast. This is sometimes called the football hold.   Follow-up care is a key part of your treatment and safety. Be sure to make and go to all appointments, and call your doctor if you are having problems. It's also a good idea to know your test results and keep a list of the medicines you take.  When should you  call for help?  Share this information with your partner, family, or a friend. They can help you watch for warning signs.  Call 911  anytime you think you may need emergency care. For example, call if:    You have thoughts of harming yourself, your baby, or another person.     You passed out (lost consciousness).     You have chest pain, are short of breath, or cough up blood.     You have a seizure.   Where to get help 24 hours a day, 7 days a week   If you or someone you know talks about suicide, self-harm, a mental health crisis, a substance use crisis, or any other kind of emotional distress, get help right away. You can:    Call the Suicide and Crisis Lifeline at 988.     Call 7-639-382-TALK (1-170.461.2862).     Text HOME to 557511 to access the Crisis Text Line.   Consider saving these numbers in your phone.  Go to [employer] for more information or to chat online.  Call your doctor now or seek immediate medical care if:    You have loose stitches, or your incision comes open.     You have signs of hemorrhage (too much bleeding), such as:  Heavy vaginal bleeding. This means that you are soaking through one or more pads in an hour. Or you pass blood clots bigger than an egg.  Feeling dizzy or lightheaded, or you feel like you may faint.  Feeling so tired or weak that you cannot do your usual activities.  A fast or irregular heartbeat.  New or worse belly pain.     You have symptoms of infection, such as:  Increased pain, swelling, warmth, or redness.  Red streaks leading from the incision.  Pus draining from the incision.  A fever.  Frequent or painful urination or blood in your urine.  Vaginal discharge that smells bad.  New or worse belly pain.     You have symptoms of a blood clot in your leg (called a deep vein thrombosis), such as:  Pain in the calf, back of the knee, thigh, or groin.  Swelling in the leg or groin.  A color change on the leg or groin. The skin may be reddish or purplish, depending on  "your usual skin color.     You have signs of preeclampsia, such as:  Sudden swelling of your face, hands, or feet.  New vision problems (such as dimness, blurring, or seeing spots).  A severe headache.     You have signs of heart failure, such as:  New or increased shortness of breath.  New or worse swelling in your legs, ankles, or feet.  Sudden weight gain, such as more than 2 to 3 pounds in a day or 5 pounds in a week.  Feeling so tired or weak that you cannot do your usual activities.     You had spinal or epidural pain relief and have:  New or worse back pain.  Increased pain, swelling, warmth, or redness at the injection site.  Tingling, weakness, or numbness in your legs or groin.   Watch closely for changes in your health, and be sure to contact your doctor if:    Your vaginal bleeding isn't decreasing.     You feel sad, anxious, or hopeless for more than a few days.     You are having problems with your breasts or breastfeeding.   Where can you learn more?  Go to https://www.[employer].net/patiented  Enter M806 in the search box to learn more about \" Section: What to Expect at Home.\"  Current as of: 2023               Content Version: 13.8    6054-4178 "[employer].   Care instructions adapted under license by your healthcare professional. If you have questions about a medical condition or this instruction, always ask your healthcare professional. "[employer] disclaims any warranty or liability for your use of this information.      "

## 2024-01-13 ENCOUNTER — PATIENT OUTREACH (OUTPATIENT)
Dept: CARE COORDINATION | Facility: CLINIC | Age: 29
End: 2024-01-13
Payer: COMMERCIAL

## 2024-01-13 NOTE — PROGRESS NOTES
[facility]: [facility]: Post-Discharge Note  SITUATION                                                      Admission:    Admission Date: 24   Reason for Admission: -  at 37w2d  - H/o CS x1   - Desire for permanent sterilization   - MACK in remission  - Rh negative status  - Rubella NI  - Varicella NI  - Hep B NI  - MDD/SARAH  - Ornithine Transcarbamylase deficiency  - Anxiety, depression, ADHD, bipolar disorder, and autism spectrum disorder  - Non epileptic seizure disorder  - Hx eating disorder  -  contractions (admit )  Discharge:   Discharge Date: 24  Discharge Diagnosis: - Same, now , delivered    BACKGROUND                                                      Per hospital discharge summary and inpatient provider notes:    Myrtle Strickland is a 28 year old  at 37w2d admitted for scheduled ECV with an epidural. After ECV was unsuccessful, she remained inpatient while her anesthesia wore off. After she was no longer feeling the effects of the epidural, she started noticing painful contractions. Her cervix was examined and noted to be 4 cm. Given fetal breech presentation,  delivery was recommended. The risks, benefits, and alternatives of  section were discussed with the patient, and she agreed to proceed.       ASSESSMENT           Discharge Assessment  How are you doing now that you are home?: Patient states she is exhausted but states this is expected.  Vaginal bleeding is pretty much gone per patient report.  Patient reports some pain on left side of incision. Rates pain an irritant 2/10 when sitting, when she gets up or moves around it goes up to a 5-6/10. Pain is improving since discharge. Denies any thoughts of harming self or others.  How are your symptoms? (Red Flag symptoms escalate to triage hotline per guidelines): Improved  Do you feel your condition is stable enough to be safe at home until your provider visit?:  Yes  Does the patient have their discharge instructions? : Yes  Does the patient have questions regarding their discharge instructions? : No  Were you started on any new medications or were there changes to any of your previous medications? : Yes  Does the patient have all of their medications?: Yes (Patient did not accept the oxycodone.)  Do you have questions regarding any of your medications? : No  Do you have all of your needed medical supplies or equipment (DME)?  (i.e. oxygen tank, CPAP, cane, etc.): Yes  Discharge follow-up appointment scheduled within 14 calendar days? : No  Is patient agreeable to assistance with scheduling? :  (2/20/24 postpartum appointment)         Post-op (Clinicians Only)  Did the patient have surgery or a procedure: Yes  Incision: closed  Drainage: No  Bleeding: none  Fever: No  Chills: No  Redness: No  Warmth: No  Swelling: No  Incision site pain: Yes (Patient reports some pain on left side of incision. Rates pain an irritant 2/10 when sitting, when she gets up or moves around it goes up to a 5-6/10. Pain is improving since discharge.)  Eating & Drinking: eating and drinking without complaints/concerns  PO Intake: regular diet  Additional Symptoms:  (Denies)  Bowel Function: normal  Date of last BM: 01/11/24  Urinary Status: voiding without complaint/concerns      PLAN                                                      Outpatient Plan:  Follow up with primary OB for routine postpartum visit in 6 weeks      No future appointments.      For any urgent concerns, please contact our 24 hour nurse triage line: [phone] ([phone])         Fernanda Orozco RN

## 2024-03-15 ENCOUNTER — PRENATAL OFFICE VISIT (OUTPATIENT)
Dept: OBGYN | Facility: CLINIC | Age: 29
End: 2024-03-15
Attending: REGISTERED NURSE
Payer: COMMERCIAL

## 2024-03-15 VITALS
HEART RATE: 68 BPM | DIASTOLIC BLOOD PRESSURE: 68 MMHG | SYSTOLIC BLOOD PRESSURE: 103 MMHG | HEIGHT: 65 IN | WEIGHT: 142.6 LBS | BODY MASS INDEX: 23.76 KG/M2

## 2024-03-15 DIAGNOSIS — R11.2 NAUSEA AND VOMITING, UNSPECIFIED VOMITING TYPE: ICD-10-CM

## 2024-03-15 PROCEDURE — 99212 OFFICE O/P EST SF 10 MIN: CPT | Mod: 25 | Performed by: REGISTERED NURSE

## 2024-03-15 PROCEDURE — G0463 HOSPITAL OUTPT CLINIC VISIT: HCPCS | Performed by: REGISTERED NURSE

## 2024-03-15 RX ORDER — PYRIDOXINE HCL (VITAMIN B6) 25 MG
25 TABLET ORAL 3 TIMES DAILY
Qty: 90 TABLET | Refills: 3 | Status: SHIPPED | OUTPATIENT
Start: 2024-03-15

## 2024-03-15 RX ORDER — ONDANSETRON 4 MG/1
4 TABLET, ORALLY DISINTEGRATING ORAL EVERY 8 HOURS PRN
Qty: 90 TABLET | Refills: 1 | Status: SHIPPED | OUTPATIENT
Start: 2024-03-15

## 2024-03-15 ASSESSMENT — PAIN SCALES - GENERAL: PAINLEVEL: NO PAIN (0)

## 2024-03-15 ASSESSMENT — EDINBURGH POSTNATAL DEPRESSION SCALE (EPDS)
TOTAL SCORE: 18
THE THOUGHT OF HARMING MYSELF HAS OCCURRED TO ME: HARDLY EVER
THINGS HAVE BEEN GETTING ON TOP OF ME: YES, SOMETIMES I HAVEN'T BEEN COPING AS WELL AS USUAL
I HAVE LOOKED FORWARD WITH ENJOYMENT TO THINGS: RATHER LESS THAN I USED TO
I HAVE BEEN SO UNHAPPY THAT I HAVE HAD DIFFICULTY SLEEPING: YES, MOST OF THE TIME
I HAVE BEEN ANXIOUS OR WORRIED FOR NO GOOD REASON: YES, VERY OFTEN
I HAVE FELT SAD OR MISERABLE: YES, QUITE OFTEN
I HAVE BEEN ABLE TO LAUGH AND SEE THE FUNNY SIDE OF THINGS: NOT QUITE SO MUCH NOW
I HAVE BEEN SO UNHAPPY THAT I HAVE BEEN CRYING: ONLY OCCASIONALLY
I HAVE FELT SCARED OR PANICKY FOR NO GOOD REASON: YES, SOMETIMES
I HAVE BLAMED MYSELF UNNECESSARILY WHEN THINGS WENT WRONG: YES, SOME OF THE TIME

## 2024-03-15 NOTE — PROGRESS NOTES
"================================================================  ROS: 10 point ROS neg other than the symptoms noted above in the HPI.   S/p RPCS 1/8/24 @ 37+2, female, following failed ECV. S/p PPTL     Patient states her nausea/vomiting came back about two weeks ago. Is at its worst about an hour after breastfeeding - vomits ~4x/day.   General: Feeling well overall.  Baby is with today and she is doing well.   Breast: Breastfeeding well, both breasts. No nipple pain.   Bleeding: stopped a couple weeks ago. Menses has not resumed.   Bowel: having regular BMs , not needing stool softener.     Bladder: voiding without difficulty.   Incision: Feels well healed, still feels a little numb around incision site.    Mood: Is baseline for patient. Has had suicidal ideation since she was 15, but has no plan to act on these thoughts. See therapist once a week. Feels she has good coping skills and support.  Patient states her mood is not any worse than her normal.   Lexapro - increased dose to 20 mg daily - started this 2 weeks ago when she saw her primary. When she takes it consistently every day at the same time she notices a difference.   Contraception: s/p PPTL    EPDS: 18      EXAM:  /68   Pulse 68   Ht 1.651 m (5' 5\")   Wt 64.7 kg (142 lb 9.6 oz)   LMP 04/12/2023 (Approximate)   Breastfeeding Yes   BMI 23.73 kg/m      General: healthy, alert, and no distress  Psych: anxiety, depression, and suicidal ideation.   Breasts:  Lactating, Non-tender, and No S/S of yeast or mastitis  Abdomen: Benign, Soft, flat, non-tender, No masses, organomegaly, and Diastasis less than 1-2 FB  Incision:  well healed and dry and intact   Bottom: deferred    ASSESSMENT:   Encounter Diagnoses   Name Primary?    Routine postpartum follow-up Yes    Nausea and vomiting, unspecified vomiting type       Normal postpartum exam after repeat c/s for breech   Pregnancy was complicated by:  Incision - low transverse     PLAN:  Orders Placed " This Encounter   Procedures    Adult GI  Referral - Consult Only      Orders Placed This Encounter   Medications    ondansetron (ZOFRAN ODT) 4 MG ODT tab     Sig: Take 1 tablet (4 mg) by mouth every 8 hours as needed for nausea     Dispense:  90 tablet     Refill:  1    pyridOXINE (VITAMIN B6) 25 MG tablet     Sig: Take 1 tablet (25 mg) by mouth 3 times daily     Dispense:  90 tablet     Refill:  3    doxylamine (UNISOM) 25 MG TABS tablet     Sig: Take 1 tablet (25 mg) by mouth at bedtime     Dispense:  90 tablet     Refill:  1      Anti-nausea medication sent to pharmacy. Referral placed to GI per pt request for persistent nausea/vomiting.  Risks and benefits of prescribed medications discussed.  Medication instructions reviewed.  Signs and symptoms of postpartum depression/anxiety discussed and resources offered. Patient will continue following with PCP regarding medications and mood. Reviewed emergency line for SI/HI  Discussed calcium intake, vitamins and supplements including Vitamin D  Exercise encouraged  Pap due 8/2024  Follow up in 1 year  Vitamin D supplement recommended    IBk am serving as a scribe; to document services personally performed by Maggie Egan CNM based on data collection and the provider's statements to me.     MARIANNA Islas    I agree with the PFSH and ROS as completed by Bk Gutierrez except for changes made by me. The remainder of the encounter was performed by me and scribed by Bk Gutierrez. The scribed note accurately reflects my personal services and decisions made by me.     JACOB Betancur CNM

## 2024-03-15 NOTE — PATIENT INSTRUCTIONS
Thank you for trusting us with your care!     If you need to contact us for questions about:  Symptoms, Scheduling & Medical Questions; Non-urgent (2-3 day response) Anushka message, Urgent (needing response today) [phone] (if after 3:30pm next day response)   Prescriptions: Please call your Pharmacy   Billing: Monica [phone] or [facility]:[phone]

## 2024-03-15 NOTE — NURSING NOTE
SUBJECTIVE:   Myrtle Strickland is here for her 6-week postpartum checkup.     PHQ-9 score: edinburgh 18  Hx of Abuse:  No    Delivery Date: 24.    Delivering provider:  Dr. Adler.    Type of delivery:  Repeat .     Delivery complications: None  Infant gender:  girl, weight 5 pounds 15.6 oz.  Feeding Method:  .  Complications reported with feeding:  infant reflux.    Bleeding:  Light.  Duration:  3 days.  Menses resumed:  No  Bowel/Urinary problems:  No    Contraception Planned:  tubal ligation  She  has had intercourse since delivery and experienced  No discomfort.  .

## 2024-03-27 ENCOUNTER — TELEPHONE (OUTPATIENT)
Dept: GASTROENTEROLOGY | Facility: CLINIC | Age: 29
End: 2024-03-27
Payer: COMMERCIAL

## 2024-03-27 NOTE — TELEPHONE ENCOUNTER
[facility] Call Center    Phone Message    May a detailed message be left on voicemail: Yes    Reason for Call: Other: Patient is currently scheduled on 5/1, as visit type New GI Urgent. This is outside the expected timeline for this referral. Patient has been added to the waitlist.      Action Taken: Message routed to:  Other: GI REFERRAL TRIAGE POOL     Travel Screening: Not Applicable

## 2024-03-28 NOTE — TELEPHONE ENCOUNTER
Writer is reaching out to help get Pt scheduled for a sooner appointment. Per the comments, Pt's mobile phone is currently not working, so call Renan's phone ([phone])to contact the Pt. Writer called Renan's phone number is Pt is not around to take the phone call. Renan informed Writer to call the Pt's mobile number ending in [phone]. Writer called Pt's mobile number listed and the phone number did not connect. Writer will send Pt a Pogoapp message.

## 2024-05-01 ENCOUNTER — OFFICE VISIT (OUTPATIENT)
Dept: GASTROENTEROLOGY | Facility: CLINIC | Age: 29
End: 2024-05-01
Payer: COMMERCIAL

## 2024-05-01 VITALS
OXYGEN SATURATION: 100 % | HEART RATE: 80 BPM | BODY MASS INDEX: 23.63 KG/M2 | DIASTOLIC BLOOD PRESSURE: 72 MMHG | SYSTOLIC BLOOD PRESSURE: 105 MMHG | WEIGHT: 142 LBS

## 2024-05-01 DIAGNOSIS — R11.2 NAUSEA AND VOMITING, UNSPECIFIED VOMITING TYPE: ICD-10-CM

## 2024-05-01 LAB
ALBUMIN SERPL BCG-MCNC: 4.2 G/DL (ref 3.5–5.2)
ALP SERPL-CCNC: 131 U/L (ref 40–150)
ALT SERPL W P-5'-P-CCNC: 23 U/L (ref 0–50)
ANION GAP SERPL CALCULATED.3IONS-SCNC: 9 MMOL/L (ref 7–15)
AST SERPL W P-5'-P-CCNC: 29 U/L (ref 0–45)
BASOPHILS # BLD AUTO: 0 10E3/UL (ref 0–0.2)
BASOPHILS NFR BLD AUTO: 0 %
BILIRUB SERPL-MCNC: 0.2 MG/DL
BUN SERPL-MCNC: 18.5 MG/DL (ref 6–20)
CALCIUM SERPL-MCNC: 8.5 MG/DL (ref 8.6–10)
CHLORIDE SERPL-SCNC: 108 MMOL/L (ref 98–107)
CREAT SERPL-MCNC: 0.86 MG/DL (ref 0.51–0.95)
DEPRECATED HCO3 PLAS-SCNC: 25 MMOL/L (ref 22–29)
EGFRCR SERPLBLD CKD-EPI 2021: >90 ML/MIN/1.73M2
EOSINOPHIL # BLD AUTO: 0.1 10E3/UL (ref 0–0.7)
EOSINOPHIL NFR BLD AUTO: 1 %
ERYTHROCYTE [DISTWIDTH] IN BLOOD BY AUTOMATED COUNT: 13.5 % (ref 10–15)
GLUCOSE SERPL-MCNC: 93 MG/DL (ref 70–99)
HCT VFR BLD AUTO: 37.2 % (ref 35–47)
HGB BLD-MCNC: 12.1 G/DL (ref 11.7–15.7)
IMM GRANULOCYTES # BLD: 0 10E3/UL
IMM GRANULOCYTES NFR BLD: 0 %
LIPASE SERPL-CCNC: 46 U/L (ref 13–60)
LYMPHOCYTES # BLD AUTO: 1.2 10E3/UL (ref 0.8–5.3)
LYMPHOCYTES NFR BLD AUTO: 17 %
MCH RBC QN AUTO: 27.5 PG (ref 26.5–33)
MCHC RBC AUTO-ENTMCNC: 32.5 G/DL (ref 31.5–36.5)
MCV RBC AUTO: 85 FL (ref 78–100)
MONOCYTES # BLD AUTO: 0.6 10E3/UL (ref 0–1.3)
MONOCYTES NFR BLD AUTO: 8 %
NEUTROPHILS # BLD AUTO: 5.1 10E3/UL (ref 1.6–8.3)
NEUTROPHILS NFR BLD AUTO: 73 %
NRBC # BLD AUTO: 0 10E3/UL
NRBC BLD AUTO-RTO: 0 /100
PLATELET # BLD AUTO: 258 10E3/UL (ref 150–450)
POTASSIUM SERPL-SCNC: 4.2 MMOL/L (ref 3.4–5.3)
PROT SERPL-MCNC: 6.7 G/DL (ref 6.4–8.3)
RBC # BLD AUTO: 4.4 10E6/UL (ref 3.8–5.2)
SODIUM SERPL-SCNC: 142 MMOL/L (ref 135–145)
WBC # BLD AUTO: 6.9 10E3/UL (ref 4–11)

## 2024-05-01 PROCEDURE — 83690 ASSAY OF LIPASE: CPT | Performed by: PHYSICIAN ASSISTANT

## 2024-05-01 PROCEDURE — 99204 OFFICE O/P NEW MOD 45 MIN: CPT | Performed by: PHYSICIAN ASSISTANT

## 2024-05-01 PROCEDURE — 36415 COLL VENOUS BLD VENIPUNCTURE: CPT | Performed by: PHYSICIAN ASSISTANT

## 2024-05-01 PROCEDURE — 80053 COMPREHEN METABOLIC PANEL: CPT | Performed by: PHYSICIAN ASSISTANT

## 2024-05-01 PROCEDURE — 85025 COMPLETE CBC W/AUTO DIFF WBC: CPT | Performed by: PHYSICIAN ASSISTANT

## 2024-05-01 ASSESSMENT — PAIN SCALES - GENERAL: PAINLEVEL: NO PAIN (0)

## 2024-05-01 NOTE — RESULT ENCOUNTER NOTE
Gloria Iraheta,    Your blood tests from today are available for you to review.  Overall, these look normal.  Some of the values are just outside the reference range, but these are not concerning.  I will be in touch when I see the results of your imaging studies.    Please let me know if you have any questions.    Sincerely,  Denny JOHNSON [facility] Hepatology, [facility] Nutrition

## 2024-05-01 NOTE — PROGRESS NOTES
NEW PATIENT GI CLINIC VISIT    CC/REFERRING MD:  Maggie Egan  REASON FOR CONSULTATION:   Maggie Egan for   Chief Complaint   Patient presents with    New Patient       ASSESSMENT/PLAN: Patient here for evaluation of chronic nausea and vomiting.  Early on in the course of this illness, her nausea and vomiting was associated with menstrual cycles.  She then had persistent symptoms during pregnancy and now is getting persistent symptoms primarily with nursing.  We spent some time reviewing the possible differential diagnoses, including cyclic vomiting syndrome, functional nausea and vomiting, atypical GERD, gastritis/peptic ulcer disease, gastric outlet obstruction/gastroparesis, biliary colic, among others.  Will plan for laboratory workup and imaging as detailed below.  Plan to treat empirically with PPI.  If labs and imaging normal and PPI is ineffective, will need to consider EGD.  Patient stated understanding of plan and I will reach out to them after labs and imaging are completed.    1. Nausea and vomiting, unspecified vomiting type    - Adult GI  Referral - Consult Only  - CBC with platelets and differential; Future  - Comprehensive metabolic panel (BMP + Alb, Alk Phos, ALT, AST, Total. Bili, TP); Future  - Lipase; Future  - US Abdomen Limited; Future  - NM Gastric Emptying; Future  - Helicobacter pylori Antigen Stool; Future  - omeprazole (PRILOSEC) 20 MG DR capsule; Take 1 capsule (20 mg) by mouth daily  Dispense: 30 capsule; Refill: 0  - CBC with platelets and differential  - Comprehensive metabolic panel (BMP + Alb, Alk Phos, ALT, AST, Total. Bili, TP)  - Lipase  - Helicobacter pylori Antigen Stool      Thank you for this consultation.  It was a pleasure to participate in the care of this patient; please contact us with any further questions.      28 minutes spent on the date of the encounter doing chart review, patient visit, and documentation    This note was created with voice  recognition software, and while reviewed for accuracy, typos may remain.     Denny Koehler PA-C  Division of Gastroenterology, Hepatology and Nutrition  [facility]  Myrtle Strickland is a 29 year old female with a past medical history significant for depression, anxiety, ADHD, hyperemesis gravidarum, migraines, among others that is seen as a new patient in the GI clinic today for recurrent nausea and vomiting.  To review, patient describes having a longstanding history of recurrent nausea and vomiting.  She notes that after the birth of her first child, she started getting cycles of nausea with vomiting at the start of her menstrual cycle.  With her most recent pregnancy, she developed hyperemesis gravidarum and needed significant hydration support for this.  Following delivery in January of this year, she had a couple of week risk bite from the nausea and vomiting, but then it started to return.  Currently, she develops nausea every time she is nursing.  If she has something in her stomach or attempts to eat something, she will have vomiting.  This has been particularly difficult because if she is having significant nausea, it is interrupting her nursing and it makes it difficult to have her  re-latch.  She has been prescribed pyridoxine, doxylamine, and Zofran, which have only provided a limited amount of relief.  She specifically denies any problems with frequent heartburn, reflux, regurgitation, dysphagia, odynophagia, early satiety.  She has not had any issues with bowel habits.  No hematemesis, hematochezia, or melena.  She has not had EGD before.  No recent abdominal imaging to review.  There is no specific family medical history of GI diseases.  No tobacco, alcohol, or drug use.  Surgical history pertinent for  x 2.    ROS:    10 point ROS neg other than the symptoms noted above in the HPI.    PREVIOUS ENDOSCOPY:  None    PERTINENT RELEVANT  "IMAGING OR LABS:  Reviewed    ALLERGIES:     Allergies   Allergen Reactions    Adhesive Tape Other (See Comments), Hives and Nausea     Burns only with waterproof bandages. [facility] hospital Tegaderm and tape is okay.    Bangura only with waterproof bandages. [facility] hospital Tegaderm and tape is okay.   \"burns\"    Dextromethorphan-Guaifenesin Hives    Guaifenesin Hives and Nausea and Vomiting    Latex Hives, Itching and Rash    Morphine And Related Hives    Mushroom Anaphylaxis    Mushroom Extract Complex Anaphylaxis and Unknown    Other (Do Not Use) Hives and Other (See Comments)     Burns only with waterproof bandages. [facility] hospital Tegaderm and tape is okay.   \"bangrua\"       PERTINENT MEDICATIONS:    Current Outpatient Medications:     acetaminophen (TYLENOL) 325 MG tablet, Take 2 tablets (650 mg) by mouth every 6 hours as needed for mild pain Start after Delivery., Disp: 100 tablet, Rfl: 0    cyclobenzaprine (FLEXERIL) 5 MG tablet, Take 1 tablet (5 mg) by mouth 3 times daily as needed for muscle spasms, Disp: 30 tablet, Rfl: 0    doxylamine (UNISOM) 25 MG TABS tablet, Take 1 tablet (25 mg) by mouth at bedtime, Disp: 90 tablet, Rfl: 1    ibuprofen (ADVIL/MOTRIN) 600 MG tablet, Take 1 tablet (600 mg) by mouth every 6 hours as needed for moderate pain Start after delivery, Disp: 60 tablet, Rfl: 0    omeprazole (PRILOSEC) 20 MG DR capsule, Take 1 capsule (20 mg) by mouth daily, Disp: 30 capsule, Rfl: 0    ondansetron (ZOFRAN ODT) 4 MG ODT tab, Take 1 tablet (4 mg) by mouth every 8 hours as needed for nausea, Disp: 90 tablet, Rfl: 1    pyridOXINE (VITAMIN B6) 25 MG tablet, Take 1 tablet (25 mg) by mouth 3 times daily, Disp: 90 tablet, Rfl: 3    vitamin C (ASCORBIC ACID) 250 MG tablet, Take 1 tablet (250 mg) by mouth daily, Disp: 90 tablet, Rfl: 2    escitalopram (LEXAPRO) 10 MG tablet, Take 1 tablet (10 mg) by mouth daily for 60 days, Disp: 30 tablet, Rfl: 1    Current Facility-Administered Medications:     lidocaine " (LMX4) kit, , Topical, Q1H PRN, Regina Cordoba MD    lidocaine 1 % 0.1-1 mL, 0.1-1 mL, Other, Q1H PRN, Regina Cordoba MD    sodium chloride (PF) 0.9% PF flush 3 mL, 3 mL, Intracatheter, Q8H, Regina Cordoba MD    sodium chloride (PF) 0.9% PF flush 3 mL, 3 mL, Intracatheter, q1 min prn, Regina Cordoba MD    terbutaline (BRETHINE) injection 0.25 mg, 0.25 mg, Subcutaneous, Once, Regina Cordoba MD    PROBLEM LIST  Patient Active Problem List    Diagnosis Date Noted    S/P repeat low transverse  2024     Priority: Medium    Encounter for triage in pregnant patient 2024     Priority: Medium    Abnormal vaginal fluids 2024     Priority: Medium    Iron deficiency anemia secondary to inadequate dietary iron intake 2024     Priority: Medium    Bacterial vaginosis in pregnancy 2024     Priority: Medium    Breech presentation 2023     Priority: Medium    Vitamin D deficiency 2023     Priority: Medium     11/3/23- Vit D 13- start supplement___      Maternal varicella, non-immune 10/24/2023     Priority: Medium    Nonimmune to hepatitis B virus 10/24/2023     Priority: Medium    High-risk pregnancy, unspecified trimester 10/20/2023     Priority: Medium     [facility] Patient Provider Group choice: MD group  Partner's name:   AYLA of care at:  []NOB folder  []Dating  []Fetal anatomy US ordered  [x]Rubella NONimmune  [x]Hep B NONimmune   [x]Varicella immune  []Pap  [x]Yes, plan utox, discussed w patient  []COVID vaccine completed  _____________________________________  []EOB folder  [x]PP Contraception plan: If tubal,consent date: 23  [x]Labor plans:  [x]Infant feeding plan: breast  []FLU shot  [x]TDAP given  []RSV  [x]Rhogam if needed, date:  [x]TOLAC consent done 11/3  [x]GCT, passed  ________________________________________  [] OTC PP meds sent  []PP plans, time off, support system discussed, resources offered  []Planning [facility] blaynet         History of  delivery 10/20/2023     Priority: Medium    ADHD (attention deficit hyperactivity disorder) 10/17/2023     Priority: Medium    History of eating disorder 10/17/2023     Priority: Medium    Bipolar disorder (H) 10/17/2023     Priority: Medium    Hyperemesis gravidarum 10/17/2023     Priority: Medium    Uncomplicated asthma 10/16/2023     Priority: Medium     no inhaler use since high school      Chronic back pain 10/16/2023     Priority: Medium    History of anemia 10/16/2023     Priority: Medium    History of premature delivery 10/16/2023     Priority: Medium     19: PTD@34.4wks, PPROM, @ [facility],, UC Q trimester both previous pregnancies at 34 wks, MFM referral ordered      Anxiety 10/16/2023     Priority: Medium     sertraline 100 mg, seeing therapist once/week, psych resources given      Rubella non-immune status, antepartum 10/16/2023     Priority: Medium     Offer vax PP       Rh negative state in antepartum period 10/16/2023     Priority: Medium     11/3/23 given      History of substance use disorder 10/16/2023     Priority: Medium     In remission. Hx of relapse after last delivery. Avoid narcotics in labor and delivery.       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 June of Genetics and Metabolism, patient has a history of clinical diagnosis of OTC deficiency without any evidence supported by biochemical or molecular analysis. Her most recent plasma amino acids showed a normal citrulline level, and she has had a normal ammonia level in the past. She has never been on amino acid supplementation. However, per their consult note, 10-15% of patients with OTC  deficiency may not have a molecular diagnosis, and the possibility of Ms. Strickland being in this 10-15% cannot be ruled out.      OTC deficiency is an X-linked disorder, meaning there is a 50% chance that a male infant is affected and a 50% chance that a female infant is a carrier. In such cases,  risks associated with delivery include the risk of hyperammonemia.      Most women with OTC deficiency do well in pregnancy, particularly in the setting of an anabolic state (e.g. early gestation). However, these women are at risk for metabolic decompensation during periods of catabolism (e.g. first trimester nausea and vomiting, labor, and the postpartum period). Signs of hyperammonemia include delirium, seizure, or coma. For patients with anemia, it is important to consider that blood transfusion increases protein load and can increase the risk of hyperammonemia.     Recommendations:  - Monthly ultrasound for fetal growth assessment beginning at 24 weeks.  - Weekly BPP beginning at 32 weeks.  - Continue care with Genetics and Metabolism.  - Intrapartum management:  Continuous infusion of IV fluids containing 10% dextrose intrapartum and postpartum. Plan for induction of labor at 39 weeks if patient desires TOLAC. If planned  section, admit the night before to begin infusion. Add insulin drip if needed to maintain euglycemia.  If no enteral intake estimated for >12 hours, or if this is anticipated to occur, please contact Genetics and Metabolism physician on call for other alternative, including IV lipids.  Ammonia levels should be monitored closely. Recommend q6h ammonia levels at the time of admission. If normal x 3, this could be spaced out as per the recommendations of the on call physician. Continue to monitor ammonia in the post delivery state.  Pre-coordination with Genetics and Metabolism is needed if surgery/anesthesia is required.  Please contact Genetics and Metabolism on call provider for  "management of the  after delivery. (See letter 22 for detailed plan of care)  - Monitor neurologic symptoms intrapartum and postpartum.  - Breastfeeding is not contraindicated.      Conversion disorder with seizures or convulsions 2019     Priority: Medium    Psychogenic nonepileptic seizure 2019     Priority: Medium     Formatting of this note might be different from the original.  Non epileptic. Inpt neuro consult 3/31/19 recommend w/u outpatient after pregnancy.  Formatting of this note might be different from the original.  Formatting of this note might be different from the original.  Non epileptic. Inpt neuro consult 3/31/19 recommend w/u outpatient after pregnancy.    Last Assessment & Plan:   Formatting of this note might be different from the original.  PLAN:  -- Will get updated EEG (last EEG in ) to evaluate for any epileptiform activity.   -- Would recommend brain MRI after delivery. If seizures change or concerning features on EEG, could obtain scan earlier.   -- Continue to work with therapist to address mental health and stress.   -- Monitor blood pressure intermittently to ensure it is not getting too low.   -- Continue no driving given recurrent episodes of loss of consciousness.   -- Would avoid large bodies of water unattended, recommend showering instead of bathing, avoid climbing to heights.   -- Follow up in Neurology after EEG testing in 3-5 weeks.      Seizures (H) 2019     Priority: Medium     no meds, last seizure 2022, had neuro referral with last pregnancy and was told it was \"anxiety induced\"- she wants to treat with sertraline only. no meds, last seizure 2022, referral sent to neuro, possible seizure 22, has neuro appt 22--feels anxiety induced and wants to treat with sertraline only. PER GUIDELINES - does not qualify for CN care      History of asthma 2013     Priority: Medium    Tobacco use disorder 2011     Priority: " Medium     Formatting of this note might be different from the original. Tobacco Abuse Formatting of this note might be different from the original. Formatting of this note might be different from the original. Tobacco Abuse      Orthostatic hypotension 2011     Priority: Medium    Benign joint hypermobility 2010     Priority: Medium    Severe major depression without psychotic features (H) 2010     Priority: Medium    Asperger's disorder 2009     Priority: Medium    Developmental expressive writing disorder 2009     Priority: Medium    Attention deficit hyperactivity disorder (ADHD) 10/15/2008     Priority: Medium     Formatting of this note might be different from the original.  LW Onset:           PERTINENT PAST MEDICAL HISTORY:  Past Medical History:   Diagnosis Date    ADHD (attention deficit hyperactivity disorder)     Anxiety     Anxiety disorder 10/15/2008    Asperger's disorder 2009    Attention deficit hyperactivity disorder (ADHD) 10/15/2008    Formatting of this note might be different from the original.  LW Onset:      Benign joint hypermobility 2010    Bipolar disorder (H)      delivery delivered 2022    Chronic back pain 10/16/2023    Conversion disorder with seizures or convulsions 2019    Depressive disorder     Developmental expressive writing disorder 2009    Headache 2023    History of anemia 10/16/2023    History of asthma 2013    History of eating disorder     History of premature delivery 10/16/2023    07/01/19: PTD@34.4wks, PPROM, @ [facility],, UC Q trimester both previous pregnancies at 34 wks, [facility] referral ordered    History of substance use     opiate    History of substance use disorder 10/16/2023    In remission. Hx of relapse after last delivery. Avoid narcotics in labor and delivery.     Hyperemesis gravidarum 10/17/2023    Major depressive disorder, recurrent episode, moderate (H) 2023     "Migraine 2023    Mixed anxiety and depressive disorder 10/05/2022    started on sertraline 50mg at 8wks started on sertraline 50mg at 8wks    Orthostatic hypotension 2011    OTC (ornithine transcarbamylase deficiency) (H24)     family hx     delivery 2019    Psychogenic nonepileptic seizure     Rh negative state in antepartum period 10/16/2023    Needs rhogam at 28 weeks    Rubella non-immune status, antepartum     Seizures (H) 2019    no meds, last seizure 2022, had neuro referral with last pregnancy and was told it was \"anxiety induced\"- she wants to treat with sertraline only. no meds, last seizure 2022, referral sent to neuro, possible seizure 22, has neuro appt 22--feels anxiety induced and wants to treat with sertraline only. PER GUIDELINES - does not qualify for CNM care    Severe major depression without psychotic features (H) 2010    Syncope and collapse 2008    Formatting of this note might be different from the original. LW Modifier:  3/10/08 ; Syncope Formatting of this note might be different from the original. Formatting of this note might be different from the original. LW Modifier:  3/10/08 ; Syncope    Tobacco use disorder 2011    Formatting of this note might be different from the original. Tobacco Abuse Formatting of this note might be different from the original. Formatting of this note might be different from the original. Tobacco Abuse    Uncomplicated asthma     no inhaler use since high school    Vaginal bleeding before 22 weeks gestation 2023       PREVIOUS SURGERIES:  Past Surgical History:   Procedure Laterality Date    AS REMOVAL OF TONSILS,<13 Y/O       SECTION N/A 2022    Procedure:  SECTION;  Surgeon: Gina Pascual MD;  Location: [facility]     SECTION N/A 2024    Procedure:  section;  Surgeon: Gabi Adler MD;  Location: [facility]     SECTION N/A 2024    " Procedure:  section;  Surgeon: Gabi Adler MD;  Location: [facility]    COMBINED  SECTION, SALPINGECTOMY BILATERAL Bilateral 2024    Procedure: Combined  Section, Salpingectomy Bilateral;  Surgeon: Gabi Adler MD;  Location: [facility]    EXTERNAL CEPHALIC VERSION N/A 2024    Procedure: EXTERNAL CEPHALIC VERSION UNDER SPINAL;  Surgeon: Gabi Adler MD;  Location: [facility]    MYRINGOTOMY, INSERT TUBE BILATERAL, COMBINED      MYRINGOTOMY, INSERT TUBE BILATERAL, COMBINED         SOCIAL HISTORY:  Social History     Socioeconomic History    Marital status: Single     Spouse name: Not on file    Number of children: Not on file    Years of education: Not on file    Highest education level: Not on file   Occupational History    Not on file   Tobacco Use    Smoking status: Former     Current packs/day: 0.00     Types: Cigarettes     Quit date: 3/1/2023     Years since quittin.1     Passive exposure: Yes    Smokeless tobacco: Former   Vaping Use    Vaping status: Never Used   Substance and Sexual Activity    Alcohol use: Not Currently    Drug use: Not Currently    Sexual activity: Not Currently     Partners: Male   Other Topics Concern    Not on file   Social History Narrative    Not on file     Social Determinants of Health     Financial Resource Strain: High Risk (2022)    [facility]    Financial Resource Strain     Difficulty of Paying Living Expenses: Not on file     Difficulty of Paying Living Expenses: Not on file   Food Insecurity: Not on file   Transportation Needs: Not on file   Physical Activity: Not on file   Stress: Not on file   Social Connections: Unknown (2022)    [facility]    Social Connections     Frequency of Communication with Friends and Family: Not on file   Interpersonal  Safety: Not on file   Housing Stability: Not on file       FAMILY HISTORY:  Family History   Problem Relation Age of Onset    Breast Cancer Mother     Anxiety Disorder Mother     Depression Mother     Hypertension Mother     Other - See Comments Mother         severe hyperemesis    Diabetes Mother     Ovarian Cancer Mother     Cancer Mother         pelvic, anal, stomach, colon, skin x6    Post-Traumatic Stress Disorder (PTSD) Mother     Cerebrovascular Disease Mother     Coronary Artery Disease Mother     Asthma Mother     Chronic Obstructive Pulmonary Disease Mother     No Known Problems Father     No Known Problems Sister     Emphysema Maternal Grandfather     No Known Problems Paternal Grandmother     No Known Problems Paternal Grandfather        Past/family/social history reviewed and no changes    PHYSICAL EXAMINATION:  Constitutional: aaox3, cooperative, pleasant, not dyspneic/diaphoretic, no acute distress  Vitals reviewed: /72 (BP Location: Left arm, Patient Position: Sitting, Cuff Size: Adult Regular)   Pulse 80   Wt 64.4 kg (142 lb)   SpO2 100%   BMI 23.63 kg/m    Wt:   Wt Readings from Last 2 Encounters:   05/01/24 64.4 kg (142 lb)   03/15/24 64.7 kg (142 lb 9.6 oz)      Eyes: Sclera anicteric/injected  CV: No edema  Respiratory: Unlabored breathing  Skin: warm, perfused, no jaundice  Psych: Normal affect  MSK: Normal gait

## 2024-05-01 NOTE — LETTER
5/1/2024         RE: Myrtle Strickland  [address]  [facility] [address]        Dear Colleague,    Thank you for referring your patient, Myrtle Strickland, to the [facility]. Please see a copy of my visit note below.    NEW PATIENT GI CLINIC VISIT    CC/REFERRING MD:  Maggie Egan  REASON FOR CONSULTATION:   Maggie Egan for   Chief Complaint   Patient presents with     New Patient       ASSESSMENT/PLAN: Patient here for evaluation of chronic nausea and vomiting.  Early on in the course of this illness, her nausea and vomiting was associated with menstrual cycles.  She then had persistent symptoms during pregnancy and now is getting persistent symptoms primarily with nursing.  We spent some time reviewing the possible differential diagnoses, including cyclic vomiting syndrome, functional nausea and vomiting, atypical GERD, gastritis/peptic ulcer disease, gastric outlet obstruction/gastroparesis, biliary colic, among others.  Will plan for laboratory workup and imaging as detailed below.  Plan to treat empirically with PPI.  If labs and imaging normal and PPI is ineffective, will need to consider EGD.  Patient stated understanding of plan and I will reach out to them after labs and imaging are completed.    1. Nausea and vomiting, unspecified vomiting type    - Adult GI  Referral - Consult Only  - CBC with platelets and differential; Future  - Comprehensive metabolic panel (BMP + Alb, Alk Phos, ALT, AST, Total. Bili, TP); Future  - Lipase; Future  - US Abdomen Limited; Future  - NM Gastric Emptying; Future  - Helicobacter pylori Antigen Stool; Future  - omeprazole (PRILOSEC) 20 MG DR capsule; Take 1 capsule (20 mg) by mouth daily  Dispense: 30 capsule; Refill: 0  - CBC with platelets and differential  - Comprehensive metabolic panel (BMP + Alb, Alk Phos, ALT, AST, Total. Bili, TP)  - Lipase  - Helicobacter pylori Antigen Stool      Thank you for this consultation.   It was a pleasure to participate in the care of this patient; please contact us with any further questions.      28 minutes spent on the date of the encounter doing chart review, patient visit, and documentation    This note was created with voice recognition software, and while reviewed for accuracy, typos may remain.     Denny Koehler PA-C  Division of Gastroenterology, Hepatology and Nutrition  [facility]  Myrtle Strickland is a 29 year old female with a past medical history significant for depression, anxiety, ADHD, hyperemesis gravidarum, migraines, among others that is seen as a new patient in the GI clinic today for recurrent nausea and vomiting.  To review, patient describes having a longstanding history of recurrent nausea and vomiting.  She notes that after the birth of her first child, she started getting cycles of nausea with vomiting at the start of her menstrual cycle.  With her most recent pregnancy, she developed hyperemesis gravidarum and needed significant hydration support for this.  Following delivery in January of this year, she had a couple of week risk bite from the nausea and vomiting, but then it started to return.  Currently, she develops nausea every time she is nursing.  If she has something in her stomach or attempts to eat something, she will have vomiting.  This has been particularly difficult because if she is having significant nausea, it is interrupting her nursing and it makes it difficult to have her  re-latch.  She has been prescribed pyridoxine, doxylamine, and Zofran, which have only provided a limited amount of relief.  She specifically denies any problems with frequent heartburn, reflux, regurgitation, dysphagia, odynophagia, early satiety.  She has not had any issues with bowel habits.  No hematemesis, hematochezia, or melena.  She has not had EGD before.  No recent abdominal imaging to review.  There is no specific  "family medical history of GI diseases.  No tobacco, alcohol, or drug use.  Surgical history pertinent for  x 2.    ROS:    10 point ROS neg other than the symptoms noted above in the HPI.    PREVIOUS ENDOSCOPY:  None    PERTINENT RELEVANT IMAGING OR LABS:  Reviewed    ALLERGIES:     Allergies   Allergen Reactions     Adhesive Tape Other (See Comments), Hives and Nausea     Burns only with waterproof bandages. [facility] Teg[facility]erm and tape is okay.    Bangura only with waterproof bandages. [facility] Tegaderm and tape is okay.   \"burns\"     Dextromethorphan-Guaifenesin Hives     Guaifenesin Hives and Nausea and Vomiting     Latex Hives, Itching and Rash     Morphine And Related Hives     Mushroom Anaphylaxis     Mushroom Extract Complex Anaphylaxis and Unknown     Other (Do Not Use) Hives and Other (See Comments)     Burns only with waterproof bandages. [facility] Tegaderm and tape is okay.   \"bangura\"       PERTINENT MEDICATIONS:    Current Outpatient Medications:      acetaminophen (TYLENOL) 325 MG tablet, Take 2 tablets (650 mg) by mouth every 6 hours as needed for mild pain Start after Delivery., Disp: 100 tablet, Rfl: 0     cyclobenzaprine (FLEXERIL) 5 MG tablet, Take 1 tablet (5 mg) by mouth 3 times daily as needed for muscle spasms, Disp: 30 tablet, Rfl: 0     doxylamine (UNISOM) 25 MG TABS tablet, Take 1 tablet (25 mg) by mouth at bedtime, Disp: 90 tablet, Rfl: 1     ibuprofen (ADVIL/MOTRIN) 600 MG tablet, Take 1 tablet (600 mg) by mouth every 6 hours as needed for moderate pain Start after delivery, Disp: 60 tablet, Rfl: 0     omeprazole (PRILOSEC) 20 MG DR capsule, Take 1 capsule (20 mg) by mouth daily, Disp: 30 capsule, Rfl: 0     ondansetron (ZOFRAN ODT) 4 MG ODT tab, Take 1 tablet (4 mg) by mouth every 8 hours as needed for nausea, Disp: 90 tablet, Rfl: 1     pyridOXINE (VITAMIN B6) 25 MG tablet, Take 1 tablet (25 mg) by mouth 3 times daily, Disp: 90 tablet, Rfl: 3     vitamin C " (ASCORBIC ACID) 250 MG tablet, Take 1 tablet (250 mg) by mouth daily, Disp: 90 tablet, Rfl: 2     escitalopram (LEXAPRO) 10 MG tablet, Take 1 tablet (10 mg) by mouth daily for 60 days, Disp: 30 tablet, Rfl: 1    Current Facility-Administered Medications:      lidocaine (LMX4) kit, , Topical, Q1H PRN, Regina Cordoba MD     lidocaine 1 % 0.1-1 mL, 0.1-1 mL, Other, Q1H PRN, Regina Cordoba MD     sodium chloride (PF) 0.9% PF flush 3 mL, 3 mL, Intracatheter, Q8H, Regina Cordoba MD     sodium chloride (PF) 0.9% PF flush 3 mL, 3 mL, Intracatheter, q1 min prn, Regina Cordoba MD     terbutaline (BRETHINE) injection 0.25 mg, 0.25 mg, Subcutaneous, Once, Regina Cordoba MD    PROBLEM LIST  Patient Active Problem List    Diagnosis Date Noted     S/P repeat low transverse  2024     Priority: Medium     Encounter for triage in pregnant patient 2024     Priority: Medium     Abnormal vaginal fluids 2024     Priority: Medium     Iron deficiency anemia secondary to inadequate dietary iron intake 2024     Priority: Medium     Bacterial vaginosis in pregnancy 2024     Priority: Medium     Breech presentation 2023     Priority: Medium     Vitamin D deficiency 2023     Priority: Medium     11/3/23- Vit D 13- start supplement___       Maternal varicella, non-immune 10/24/2023     Priority: Medium     Nonimmune to hepatitis B virus 10/24/2023     Priority: Medium     High-risk pregnancy, unspecified trimester 10/20/2023     Priority: Medium     [facility] Patient Provider Group choice: MD group  Partner's name:   AYLA of care at:  []NOB folder  []Dating  []Fetal anatomy US ordered  [x]Rubella NONimmune  [x]Hep B NONimmune   [x]Varicella immune  []Pap  [x]Yes, plan utox, discussed w patient  []COVID vaccine completed  _____________________________________  []EOB folder  [x]PP Contraception plan: If tubal,consent date: 23  [x]Labor plans:  [x]Infant  feeding plan: breast  []FLU shot  [x]TDAP given  []RSV  [x]Rhogam if needed, date:  [x]TOLAC consent done 11/3  [x]GCT, passed  ________________________________________  [] OTC PP meds sent  []PP plans, time off, support system discussed, resources offered  []Planning CS-ERAS pkt         History of  delivery 10/20/2023     Priority: Medium     ADHD (attention deficit hyperactivity disorder) 10/17/2023     Priority: Medium     History of eating disorder 10/17/2023     Priority: Medium     Bipolar disorder (H) 10/17/2023     Priority: Medium     Hyperemesis gravidarum 10/17/2023     Priority: Medium     Uncomplicated asthma 10/16/2023     Priority: Medium     no inhaler use since high school       Chronic back pain 10/16/2023     Priority: Medium     History of anemia 10/16/2023     Priority: Medium     History of premature delivery 10/16/2023     Priority: Medium     19: PTD@34.4wks, PPROM, @ [facility],, UC Q trimester both previous pregnancies at 34 wks, MFM referral ordered       Anxiety 10/16/2023     Priority: Medium     sertraline 100 mg, seeing therapist once/week, psych resources given       Rubella non-immune status, antepartum 10/16/2023     Priority: Medium     Offer vax PP        Rh negative state in antepartum period 10/16/2023     Priority: Medium     11/3/23 given       History of substance use disorder 10/16/2023     Priority: Medium     In remission. Hx of relapse after last delivery. Avoid narcotics in labor and delivery.        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 June of Genetics and Metabolism, patient has a history of clinical diagnosis of OTC  deficiency without any evidence supported by biochemical or molecular analysis. Her most recent plasma amino acids showed a normal citrulline level, and she has had a normal ammonia level in the past. She has never been on amino acid supplementation. However, per their consult note, 10-15% of patients with OTC deficiency may not have a molecular diagnosis, and the possibility of Ms. Stirckland being in this 10-15% cannot be ruled out.      OTC deficiency is an X-linked disorder, meaning there is a 50% chance that a male infant is affected and a 50% chance that a female infant is a carrier. In such cases,  risks associated with delivery include the risk of hyperammonemia.      Most women with OTC deficiency do well in pregnancy, particularly in the setting of an anabolic state (e.g. early gestation). However, these women are at risk for metabolic decompensation during periods of catabolism (e.g. first trimester nausea and vomiting, labor, and the postpartum period). Signs of hyperammonemia include delirium, seizure, or coma. For patients with anemia, it is important to consider that blood transfusion increases protein load and can increase the risk of hyperammonemia.     Recommendations:  - Monthly ultrasound for fetal growth assessment beginning at 24 weeks.  - Weekly BPP beginning at 32 weeks.  - Continue care with Genetics and Metabolism.  - Intrapartum management:  Continuous infusion of IV fluids containing 10% dextrose intrapartum and postpartum. Plan for induction of labor at 39 weeks if patient desires TOLAC. If planned  section, admit the night before to begin infusion. Add insulin drip if needed to maintain euglycemia.  If no enteral intake estimated for >12 hours, or if this is anticipated to occur, please contact Genetics and Metabolism physician on call for other alternative, including IV lipids.  Ammonia levels should be monitored closely. Recommend q6h ammonia levels at the time of admission.  "If normal x 3, this could be spaced out as per the recommendations of the on call physician. Continue to monitor ammonia in the post delivery state.  Pre-coordination with Genetics and Metabolism is needed if surgery/anesthesia is required.  Please contact Genetics and Metabolism on call provider for management of the  after delivery. (See letter 22 for detailed plan of care)  - Monitor neurologic symptoms intrapartum and postpartum.  - Breastfeeding is not contraindicated.       Conversion disorder with seizures or convulsions 2019     Priority: Medium     Psychogenic nonepileptic seizure 2019     Priority: Medium     Formatting of this note might be different from the original.  Non epileptic. Inpt neuro consult 3/31/19 recommend w/u outpatient after pregnancy.  Formatting of this note might be different from the original.  Formatting of this note might be different from the original.  Non epileptic. Inpt neuro consult 3/31/19 recommend w/u outpatient after pregnancy.    Last Assessment & Plan:   Formatting of this note might be different from the original.  PLAN:  -- Will get updated EEG (last EEG in ) to evaluate for any epileptiform activity.   -- Would recommend brain MRI after delivery. If seizures change or concerning features on EEG, could obtain scan earlier.   -- Continue to work with therapist to address mental health and stress.   -- Monitor blood pressure intermittently to ensure it is not getting too low.   -- Continue no driving given recurrent episodes of loss of consciousness.   -- Would avoid large bodies of water unattended, recommend showering instead of bathing, avoid climbing to heights.   -- Follow up in Neurology after EEG testing in 3-5 weeks.       Seizures (H) 2019     Priority: Medium     no meds, last seizure 2022, had neuro referral with last pregnancy and was told it was \"anxiety induced\"- she wants to treat with sertraline only. no meds, last " seizure 2022, referral sent to neuro, possible seizure 22, has neuro appt 22--feels anxiety induced and wants to treat with sertraline only. PER GUIDELINES - does not qualify for [facility] care       History of asthma 2013     Priority: Medium     Tobacco use disorder 2011     Priority: Medium     Formatting of this note might be different from the original. Tobacco Abuse Formatting of this note might be different from the original. Formatting of this note might be different from the original. Tobacco Abuse       Orthostatic hypotension 2011     Priority: Medium     Benign joint hypermobility 2010     Priority: Medium     Severe major depression without psychotic features (H) 2010     Priority: Medium     Asperger's disorder 2009     Priority: Medium     Developmental expressive writing disorder 2009     Priority: Medium     Attention deficit hyperactivity disorder (ADHD) 10/15/2008     Priority: Medium     Formatting of this note might be different from the original.  LW Onset:           PERTINENT PAST MEDICAL HISTORY:  Past Medical History:   Diagnosis Date     ADHD (attention deficit hyperactivity disorder)      Anxiety      Anxiety disorder 10/15/2008     Asperger's disorder 2009     Attention deficit hyperactivity disorder (ADHD) 10/15/2008    Formatting of this note might be different from the original.  LW Onset:       Benign joint hypermobility 2010     Bipolar disorder (H)       delivery delivered 2022     Chronic back pain 10/16/2023     Conversion disorder with seizures or convulsions 2019     Depressive disorder      Developmental expressive writing disorder 2009     Headache 2023     History of anemia 10/16/2023     History of asthma 2013     History of eating disorder      History of premature delivery 10/16/2023    07/01/19: PTD@34.4wks, PPROM, @ [facility],, UC Q trimester both previous  "pregnancies at 34 wks, M referral ordered     History of substance use     opiate     History of substance use disorder 10/16/2023    In remission. Hx of relapse after last delivery. Avoid narcotics in labor and delivery.      Hyperemesis gravidarum 10/17/2023     Major depressive disorder, recurrent episode, moderate (H) 2023     Migraine 2023     Mixed anxiety and depressive disorder 10/05/2022    started on sertraline 50mg at 8wks started on sertraline 50mg at 8wks     Orthostatic hypotension 2011     OTC (ornithine transcarbamylase deficiency) (H24)     family hx      delivery 2019     Psychogenic nonepileptic seizure      Rh negative state in antepartum period 10/16/2023    Needs rhogam at 28 weeks     Rubella non-immune status, antepartum      Seizures (H) 2019    no meds, last seizure 2022, had neuro referral with last pregnancy and was told it was \"anxiety induced\"- she wants to treat with sertraline only. no meds, last seizure 2022, referral sent to neuro, possible seizure 22, has neuro appt 22--feels anxiety induced and wants to treat with sertraline only. PER GUIDELINES - does not qualify for [facility] care     Severe major depression without psychotic features (H) 2010     Syncope and collapse 2008    Formatting of this note might be different from the original. LW Modifier:  3/10/08 ; Syncope Formatting of this note might be different from the original. Formatting of this note might be different from the original. LW Modifier:  3/10/08 ; Syncope     Tobacco use disorder 2011    Formatting of this note might be different from the original. Tobacco Abuse Formatting of this note might be different from the original. Formatting of this note might be different from the original. Tobacco Abuse     Uncomplicated asthma     no inhaler use since high school     Vaginal bleeding before 22 weeks gestation 2023       PREVIOUS SURGERIES:  Past " Surgical History:   Procedure Laterality Date     AS REMOVAL OF TONSILS,<11 Y/O        SECTION N/A 2022    Procedure:  SECTION;  Surgeon: Gina Pascual MD;  Location: [facility]      SECTION N/A 2024    Procedure:  section;  Surgeon: Gabi Adler MD;  Location: [facility]      SECTION N/A 2024    Procedure:  section;  Surgeon: Gabi Adler MD;  Location: [facility]     COMBINED  SECTION, SALPINGECTOMY BILATERAL Bilateral 2024    Procedure: Combined  Section, Salpingectomy Bilateral;  Surgeon: Gabi Adler MD;  Location: [facility]     EXTERNAL CEPHALIC VERSION N/A 2024    Procedure: EXTERNAL CEPHALIC VERSION UNDER SPINAL;  Surgeon: Gabi Adler MD;  Location: [facility]     MYRINGOTOMY, INSERT TUBE BILATERAL, COMBINED       MYRINGOTOMY, INSERT TUBE BILATERAL, COMBINED         SOCIAL HISTORY:  Social History     Socioeconomic History     Marital status: Single     Spouse name: Not on file     Number of children: Not on file     Years of education: Not on file     Highest education level: Not on file   Occupational History     Not on file   Tobacco Use     Smoking status: Former     Current packs/day: 0.00     Types: Cigarettes     Quit date: 3/1/2023     Years since quittin.1     Passive exposure: Yes     Smokeless tobacco: Former   Vaping Use     Vaping status: Never Used   Substance and Sexual Activity     Alcohol use: Not Currently     Drug use: Not Currently     Sexual activity: Not Currently     Partners: Male   Other Topics Concern     Not on file   Social History Narrative     Not on file     Social Determinants of Health     Financial Resource Strain: High Risk (2022)    Received from [facility], [facility]    Financial Resource Strain      Difficulty of Paying Living Expenses: Not on file      Difficulty of Paying Living Expenses: Not on file   Food  Insecurity: Not on file   Transportation Needs: Not on file   Physical Activity: Not on file   Stress: Not on file   Social Connections: Unknown (1/1/2022)    Received from [facility], [facility]    Social Connections      Frequency of Communication with Friends and Family: Not on file   Interpersonal Safety: Not on file   Housing Stability: Not on file       FAMILY HISTORY:  Family History   Problem Relation Age of Onset     Breast Cancer Mother      Anxiety Disorder Mother      Depression Mother      Hypertension Mother      Other - See Comments Mother         severe hyperemesis     Diabetes Mother      Ovarian Cancer Mother      Cancer Mother         pelvic, anal, stomach, colon, skin x6     Post-Traumatic Stress Disorder (PTSD) Mother      Cerebrovascular Disease Mother      Coronary Artery Disease Mother      Asthma Mother      Chronic Obstructive Pulmonary Disease Mother      No Known Problems Father      No Known Problems Sister      Emphysema Maternal Grandfather      No Known Problems Paternal Grandmother      No Known Problems Paternal Grandfather        Past/family/social history reviewed and no changes    PHYSICAL EXAMINATION:  Constitutional: aaox3, cooperative, pleasant, not dyspneic/diaphoretic, no acute distress  Vitals reviewed: /72 (BP Location: Left arm, Patient Position: Sitting, Cuff Size: Adult Regular)   Pulse 80   Wt 64.4 kg (142 lb)   SpO2 100%   BMI 23.63 kg/m    Wt:   Wt Readings from Last 2 Encounters:   05/01/24 64.4 kg (142 lb)   03/15/24 64.7 kg (142 lb 9.6 oz)      Eyes: Sclera anicteric/injected  CV: No edema  Respiratory: Unlabored breathing  Skin: warm, perfused, no jaundice  Psych: Normal affect  MSK: Normal gait                      Again, thank you for allowing me to participate in the care of your patient.        Sincerely,        Denny Koehler PA-C

## 2024-11-16 ENCOUNTER — HEALTH MAINTENANCE LETTER (OUTPATIENT)
Age: 29
End: 2024-11-16

## 2025-02-04 NOTE — PATIENT INSTRUCTIONS
"Weeks 32 to 34 of Your Pregnancy: Care Instructions    Decide whether you want to bank or donate your baby's umbilical cord blood. If you want to save this blood, you have to arrange for it ahead of time.   Decide about circumcision. Personal, Amish, or cultural beliefs may play a role in your decision. You get to decide what you want for your baby.     Learn how to ease hemorrhoids.    Get more liquids, fruits, vegetables, and fiber in your diet.  Avoid sitting for too long.  Clean yourself with moist toilet paper. Or try witch hazel pads.  Try ice packs or warm sitz baths for discomfort.  Use hydrocortisone cream for pain or itching.  Ask your doctor about stool softeners.    Consider the benefits of breastfeeding.    It reduces your baby's risk of sudden infant death syndrome (SIDS).   babies are less likely to get certain infections. And they're less likely to be obese or get diabetes later in life.  It can lower your risk of breast and ovarian cancers and osteoporosis.  It saves you money.  Follow-up care is a key part of your treatment and safety. Be sure to make and go to all appointments, and call your doctor if you are having problems. It's also a good idea to know your test results and keep a list of the medicines you take.  Where can you learn more?  Go to https://www.[employer].net/patiented  Enter X711 in the search box to learn more about \"Weeks 32 to 34 of Your Pregnancy: Care Instructions.\"  Current as of: July 11, 2023               Content Version: 13.8    [id number] [employer].   Care instructions adapted under license by your healthcare professional. If you have questions about a medical condition or this instruction, always ask your healthcare professional. [employer] disclaims any warranty or liability for your use of this information.      You have been provided the Any Day Now: Early Labor at Home document.    Additional copies can be found here:  " Medication pended and routed to Refill team.  4. Hypothyroism  Appears stable, CPM, recheck 5/2025, f/u P and in 1 year    Last filled in 2017 external record   [email or web address]  You have been provided the What I'd Wish I'd Known About Giving Birth document.    Additional copies can be found here:  [email or web address]

## 2025-06-08 ENCOUNTER — HOSPITAL ENCOUNTER (EMERGENCY)
Facility: CLINIC | Age: 30
Discharge: HOME | End: 2025-06-08
Attending: EMERGENCY MEDICINE | Admitting: EMERGENCY MEDICINE
Payer: COMMERCIAL

## 2025-06-08 VITALS
HEART RATE: 83 BPM | DIASTOLIC BLOOD PRESSURE: 64 MMHG | TEMPERATURE: 98.2 F | OXYGEN SATURATION: 98 % | SYSTOLIC BLOOD PRESSURE: 124 MMHG | RESPIRATION RATE: 18 BRPM

## 2025-06-08 DIAGNOSIS — N10 PYELONEPHRITIS, ACUTE: ICD-10-CM

## 2025-06-08 LAB
ALBUMIN UR-MCNC: 50 MG/DL
APPEARANCE UR: ABNORMAL
BILIRUB UR QL STRIP: NEGATIVE
COLOR UR AUTO: ABNORMAL
GLUCOSE UR STRIP-MCNC: NEGATIVE MG/DL
HCG UR QL: NEGATIVE
HGB UR QL STRIP: ABNORMAL
KETONES UR STRIP-MCNC: NEGATIVE MG/DL
LEUKOCYTE ESTERASE UR QL STRIP: ABNORMAL
MUCOUS THREADS #/AREA URNS LPF: PRESENT /LPF
NITRATE UR QL: NEGATIVE
PH UR STRIP: 5.5 [PH] (ref 5–7)
RBC URINE: >182 /HPF
SP GR UR STRIP: 1.01 (ref 1–1.03)
SQUAMOUS EPITHELIAL: 1 /HPF
UROBILINOGEN UR STRIP-MCNC: NORMAL MG/DL
WBC CLUMPS #/AREA URNS HPF: PRESENT /HPF
WBC URINE: >182 /HPF

## 2025-06-08 PROCEDURE — 99284 EMERGENCY DEPT VISIT MOD MDM: CPT | Mod: 25

## 2025-06-08 PROCEDURE — 96375 TX/PRO/DX INJ NEW DRUG ADDON: CPT

## 2025-06-08 PROCEDURE — 81001 URINALYSIS AUTO W/SCOPE: CPT | Performed by: EMERGENCY MEDICINE

## 2025-06-08 PROCEDURE — 96365 THER/PROPH/DIAG IV INF INIT: CPT

## 2025-06-08 PROCEDURE — 81025 URINE PREGNANCY TEST: CPT | Performed by: EMERGENCY MEDICINE

## 2025-06-08 PROCEDURE — 87186 SC STD MICRODIL/AGAR DIL: CPT | Performed by: EMERGENCY MEDICINE

## 2025-06-08 PROCEDURE — 250N000011 HC RX IP 250 OP 636: Mod: JZ | Performed by: EMERGENCY MEDICINE

## 2025-06-08 RX ORDER — KETOROLAC TROMETHAMINE 15 MG/ML
10 INJECTION, SOLUTION INTRAMUSCULAR; INTRAVENOUS ONCE
Status: COMPLETED | OUTPATIENT
Start: 2025-06-08 | End: 2025-06-08

## 2025-06-08 RX ORDER — CEPHALEXIN 500 MG/1
500 CAPSULE ORAL 4 TIMES DAILY
Qty: 56 CAPSULE | Refills: 0 | Status: SHIPPED | OUTPATIENT
Start: 2025-06-08 | End: 2025-06-22

## 2025-06-08 RX ORDER — CEFTRIAXONE 2 G/1
2 INJECTION, POWDER, FOR SOLUTION INTRAMUSCULAR; INTRAVENOUS ONCE
Status: COMPLETED | OUTPATIENT
Start: 2025-06-08 | End: 2025-06-08

## 2025-06-08 RX ADMIN — KETOROLAC TROMETHAMINE 10 MG: 15 INJECTION, SOLUTION INTRAMUSCULAR; INTRAVENOUS at 05:09

## 2025-06-08 RX ADMIN — CEFTRIAXONE SODIUM 2 G: 2 INJECTION, POWDER, FOR SOLUTION INTRAMUSCULAR; INTRAVENOUS at 04:24

## 2025-06-08 ASSESSMENT — ACTIVITIES OF DAILY LIVING (ADL)
ADLS_ACUITY_SCORE: 42

## 2025-06-08 ASSESSMENT — COLUMBIA-SUICIDE SEVERITY RATING SCALE - C-SSRS
6. HAVE YOU EVER DONE ANYTHING, STARTED TO DO ANYTHING, OR PREPARED TO DO ANYTHING TO END YOUR LIFE?: NO
2. HAVE YOU ACTUALLY HAD ANY THOUGHTS OF KILLING YOURSELF IN THE PAST MONTH?: NO
1. IN THE PAST MONTH, HAVE YOU WISHED YOU WERE DEAD OR WISHED YOU COULD GO TO SLEEP AND NOT WAKE UP?: NO

## 2025-06-08 NOTE — ED TRIAGE NOTES
Patient here with urinary frequency and burning , she also c/o back pain . Her symptoms started 2 days ago     Triage Assessment (Adult)       Row Name 06/08/25 0258          Triage Assessment    Airway WDL WDL        Respiratory WDL    Respiratory WDL WDL        Skin Circulation/Temperature WDL    Skin Circulation/Temperature WDL WDL        Cardiac WDL    Cardiac WDL WDL        Peripheral/Neurovascular WDL    Peripheral Neurovascular WDL WDL        Cognitive/Neuro/Behavioral WDL    Cognitive/Neuro/Behavioral WDL WDL

## 2025-06-08 NOTE — ED PROVIDER NOTES
Emergency Department Note      History of Present Illness   Chief Complaint   Urinary Frequency      HPI   Myrtle Strickland is a 30 year old female with a history of seizures, bipolar disorder, ADHD, and chronic back pain who presents to the ED with her family for evaluation of urinary frequency. She reports right sided low back pain that began 2 days ago then woke up with difficulty breathing. She also endorses urinary frequency. This is similar to her past UTI. No use of pain medications. She has been trying to stay hydrated.     Independent Historian   None      Past Medical History   Medical History and Problem List   ADHD   Anxiety  Asperger's disorder  Bipolar disorder   Chronic back pain  Developmental expressive writing disorder  Headache  Asthma   Eating disorder  Substance use disorder  Hyperemesis gravidarum  Major depressive disorder  Migraine  Orthostatic hypotension  OTC (ornithine transcarbamylase deficiency)  Psychogenic nonepileptic seizure  Seizures   Tobacco use disorder  Autism spectrum disorder   Vitamin D deficiency   Ornithine transcarbamylase deficiency     Medications   Flexeril  Unisom  Lexapro  Prilosec        Surgical History   Tonsillectomy   x2  External cephalic version  Myringotomy - bilateral     Physical Exam     Patient Vitals for the past 24 hrs:   BP Temp Pulse Resp SpO2   25 0249 128/80 98.2  F (36.8  C) 98 18 98 %     Physical Exam  General: Resting on the gurney, appears uncomfortable  Head:  The scalp, face, and head appear normal  Mouth/Throat: Mucus membranes are moist  CV:  Regular rate    Normal S1 and S2  No pathological murmur   Resp:  Breath sounds clear and equal bilaterally    Non-labored, no retractions or accessory muscle use    No coarseness    No wheezing   GI:  Abdomen is soft, no rigidity    Moderate suprapubic tenderness to palpation  :  Right CVA tenderness to percussion  MS:  Normal motor assessment of all extremities.    Good capillary refill  noted.  Skin:  No rash or lesions noted.  Neuro:   Speech is normal and fluent. No apparent deficit.  Psych: Awake. Alert.  Normal affect.      Appropriate interactions.      Diagnostics   Lab Results   Labs Ordered and Resulted from Time of ED Arrival to Time of ED Departure   ROUTINE UA WITH MICROSCOPIC REFLEX TO CULTURE - Abnormal       Result Value    Color Urine Straw      Appearance Urine Slightly Cloudy (*)     Glucose Urine Negative      Bilirubin Urine Negative      Ketones Urine Negative      Specific Gravity Urine 1.015      Blood Urine Large (*)     pH Urine 5.5      Protein Albumin Urine 50 (*)     Urobilinogen Urine Normal      Nitrite Urine Negative      Leukocyte Esterase Urine Large (*)     WBC Clumps Urine Present (*)     Mucus Urine Present (*)     RBC Urine >182 (*)     WBC Urine >182 (*)     Squamous Epithelials Urine 1     HCG QUALITATIVE URINE - Normal    hCG Urine Qualitative Negative     URINE CULTURE       ED Course    Medications Administered   Medications   cefTRIAXone (ROCEPHIN) 2 g vial to attach to  ml bag for ADULTS or NS 50 ml bag for PEDS (0 g Intravenous Stopped 6/8/25 0509)   ketorolac (TORADOL) injection 10 mg (10 mg Intravenous $Given 6/8/25 0509)       ED Course   ED Course as of 06/08/25 0409   Sun Jun 08, 2025   0407 I obtained history and examined the patient as noted above.        Medical Decision Making / Diagnosis       LORRI Strickland is a 30 year old female who presents for evaluation of several days of UTI symptoms followed by right sided flank pain.  Urinalysis is consistent with a urinary tract infection; Systemic symptoms present as well as signs and symptoms consistent with pyelonephritis.   There is no clinical evidence of perinephric abscess, emphysematous pyelonephritis, ureterolithiasis, appendicitis, colitis, diverticulitis or any intraabdominal catastrophe.  Patient was given dose of antibiotics in ED; see above.     Given well appearance, I believe  the risk of imminent deterioration is low enough that outpatient management is indicated.  Return if increasing pain, vomiting, fever, or inability to tolerate the oral antibiotic.  Follow up with primary physician is indicated in 1 days.      Disposition   The patient was discharged.     Diagnosis     ICD-10-CM    1. Pyelonephritis, acute  N10            Discharge Medications   Discharge Medication List as of 6/8/2025  5:34 AM        START taking these medications    Details   cephALEXin (KEFLEX) 500 MG capsule Take 1 capsule (500 mg) by mouth 4 times daily for 14 days., Disp-56 capsule, R-0, E-Prescribe                Gabriela Montoya MD  06/08/25 0754

## 2025-06-10 ENCOUNTER — TELEPHONE (OUTPATIENT)
Dept: NURSING | Facility: CLINIC | Age: 30
End: 2025-06-10
Payer: COMMERCIAL

## 2025-06-10 NOTE — LETTER
June 11, 2025        Myrtle Strickland  [address]   [place] [address]          Dear Myrtle Strickland:    You were seen in the [facility] Emergency Department at [facility] on 6/8/2025.  We are unable to reach you by phone, so we are sending you this letter.     It is important that you call [facility] Emergency Department lab result nurse at [phone], as we have information to relay to you AND/OR we MAY have to make some changes in your treatment.    Best time to call back is between 9AM and 5:30PM, 7 days a week.      Sincerely,     [facility] Emergency Department Lab Result RN  [phone]

## 2025-06-10 NOTE — TELEPHONE ENCOUNTER
Spoke to Karyn in IDDLat 1:47pm on 6/10/25 and requested oxacillin susceptibility on the follow urine culture. Patient is on cephalexin.        Natalie Roblero RN  06/10/25 1:54 PM  [facility]  Emergency Department Lab Results RN

## 2025-06-11 LAB — BACTERIA UR CULT: ABNORMAL

## 2025-06-11 NOTE — TELEPHONE ENCOUNTER
[facility]    Reason for call: Lab Result Notification     Lab Result (including Rx patient on, if applicable).  If culture, copy of lab report at bottom.  Lab Result: Urine Culture - see below    ED Rx: cephALEXin (KEFLEX) 500 MG capsule - Take 1 capsule (500 mg) by mouth 4 times daily for 14 days   >100,000 CFU/mL Staphylococcus saprophyticus (susceptible)    Creatinine Level (mg/dl)   Creatinine   Date Value Ref Range Status   05/01/2024 0.86 0.51 - 0.95 mg/dL Final    Creatinine clearance (ml/min), if applicable    Creatinine clearance cannot be calculated (Patient's most recent lab result is older than the maximum 365 days allowed.)     ED Symptoms: Patient presented to [facility] ED on 6/8/2025 for evaluation of urinary frequency    RN Recommendations/Instructions per [facility] ED lab result protocol:   [facility] ED lab result protocol utilized: Urine Culture  Continue antibiotic/medication as prescribed: cephalexin, pending patient assessment        Unable to reach patient/caregiver. Unable to leave a message - voice mail is not set up    Letter pended to be sent via [employer].    SHAMA Anderson, RN

## (undated) DEVICE — PACK C-SECTION LF PL15OTA83B

## (undated) DEVICE — GLOVE ESTEEM POWDER FREE SMT 7.5  2D72PT75

## (undated) DEVICE — SU VICRYL 4-0 KS 27" UND J662H

## (undated) DEVICE — SU MONOCRYL 0 CTB-1 36" YB946

## (undated) DEVICE — STOCKING SLEEVE COMPRESSION CALF LG

## (undated) DEVICE — GLOVE PROTEXIS BLUE W/NEU-THERA 6.5  2D73EB65

## (undated) DEVICE — PREP CHLORAPREP 26ML TINTED ORANGE  260815

## (undated) DEVICE — SU VICRYL 0 CT-1 36" J346H

## (undated) DEVICE — ESU GROUND PAD UNIVERSAL W/O CORD

## (undated) DEVICE — SU MONOCRYL 0 CT-1 36" Y346H

## (undated) DEVICE — DRSG ABDOMINAL 07 1/2X8" 7197D

## (undated) DEVICE — GLOVE PROTEXIS BLUE W/NEU-THERA 7.5  2D73EB75

## (undated) DEVICE — STRAP KNEE/BODY 31143004

## (undated) DEVICE — GLOVE ESTEEM POWDER FREE SMT 6.0  2D72PT60

## (undated) DEVICE — SOL NACL 0.9% IRRIG 1000ML BOTTLE 07138-09

## (undated) DEVICE — ESU PENCIL W/SMOKE EVAC NEPTUNE STRYKER 0703-046-000

## (undated) DEVICE — SOL WATER IRRIG 1000ML BOTTLE 07139-09

## (undated) DEVICE — SU MONOCRYL 4-0 PS-2 18" UND Y496G

## (undated) DEVICE — CATH TRAY FOLEY 16FR BARDEX W/DRAIN BAG STATLOCK 300316A

## (undated) DEVICE — CATH TRAY FOLEY SURESTEP 16FR WDRAIN BAG STLK LATEX A300316A

## (undated) DEVICE — DRSG STERI STRIP 1/4X3" R1541

## (undated) RX ORDER — FENTANYL CITRATE 50 UG/ML
INJECTION, SOLUTION INTRAMUSCULAR; INTRAVENOUS
Status: DISPENSED
Start: 2024-01-08

## (undated) RX ORDER — OXYTOCIN/0.9 % SODIUM CHLORIDE 30/500 ML
PLASTIC BAG, INJECTION (ML) INTRAVENOUS
Status: DISPENSED
Start: 2022-08-26

## (undated) RX ORDER — EPHEDRINE SULFATE 50 MG/ML
INJECTION, SOLUTION INTRAMUSCULAR; INTRAVENOUS; SUBCUTANEOUS
Status: DISPENSED
Start: 2024-01-08

## (undated) RX ORDER — DIPHENHYDRAMINE HYDROCHLORIDE 50 MG/ML
INJECTION INTRAMUSCULAR; INTRAVENOUS
Status: DISPENSED
Start: 2022-09-17

## (undated) RX ORDER — FENTANYL CITRATE 50 UG/ML
INJECTION, SOLUTION INTRAMUSCULAR; INTRAVENOUS
Status: DISPENSED
Start: 2022-09-17

## (undated) RX ORDER — BUPIVACAINE HYDROCHLORIDE 2.5 MG/ML
INJECTION, SOLUTION EPIDURAL; INFILTRATION; INTRACAUDAL
Status: DISPENSED
Start: 2024-01-08

## (undated) RX ORDER — FENTANYL CITRATE-0.9 % NACL/PF 10 MCG/ML
PLASTIC BAG, INJECTION (ML) INTRAVENOUS
Status: DISPENSED
Start: 2024-01-08

## (undated) RX ORDER — MORPHINE SULFATE 2 MG/ML
INJECTION, SOLUTION INTRAMUSCULAR; INTRAVENOUS
Status: DISPENSED
Start: 2022-09-17

## (undated) RX ORDER — ONDANSETRON 2 MG/ML
INJECTION INTRAMUSCULAR; INTRAVENOUS
Status: DISPENSED
Start: 2022-08-26

## (undated) RX ORDER — PHENYLEPHRINE HCL IN 0.9% NACL 50MG/250ML
PLASTIC BAG, INJECTION (ML) INTRAVENOUS
Status: DISPENSED
Start: 2022-08-26

## (undated) RX ORDER — ONDANSETRON 2 MG/ML
INJECTION INTRAMUSCULAR; INTRAVENOUS
Status: DISPENSED
Start: 2024-01-08